# Patient Record
Sex: FEMALE | Race: WHITE | Employment: FULL TIME | URBAN - METROPOLITAN AREA
[De-identification: names, ages, dates, MRNs, and addresses within clinical notes are randomized per-mention and may not be internally consistent; named-entity substitution may affect disease eponyms.]

---

## 2017-03-22 ENCOUNTER — OFFICE VISIT (OUTPATIENT)
Dept: OTOLARYNGOLOGY | Age: 27
End: 2017-03-22
Payer: COMMERCIAL

## 2017-03-22 ENCOUNTER — HOSPITAL ENCOUNTER (OUTPATIENT)
Age: 27
Setting detail: SPECIMEN
Discharge: HOME | End: 2017-03-22

## 2017-03-22 VITALS
HEART RATE: 79 BPM | BODY MASS INDEX: 21.21 KG/M2 | WEIGHT: 132 LBS | HEIGHT: 66 IN | SYSTOLIC BLOOD PRESSURE: 114 MMHG | TEMPERATURE: 98 F | DIASTOLIC BLOOD PRESSURE: 75 MMHG

## 2017-03-22 DIAGNOSIS — H92.03 OTALGIA OF BOTH EARS: ICD-10-CM

## 2017-03-22 DIAGNOSIS — J32.0 CHRONIC MAXILLARY SINUSITIS: ICD-10-CM

## 2017-03-22 DIAGNOSIS — J30.2 SEASONAL ALLERGIC RHINITIS, UNSPECIFIED ALLERGIC RHINITIS TRIGGER: ICD-10-CM

## 2017-03-22 DIAGNOSIS — H91.90 HEARING LOSS, UNSPECIFIED LATERALITY: Primary | ICD-10-CM

## 2017-03-22 PROCEDURE — 99203 OFFICE O/P NEW LOW 30 MIN: CPT | Performed by: OTOLARYNGOLOGY

## 2017-03-27 ENCOUNTER — TELEPHONE (OUTPATIENT)
Dept: OTOLARYNGOLOGY | Age: 27
End: 2017-03-27

## 2017-04-10 ENCOUNTER — TELEPHONE (OUTPATIENT)
Dept: OTOLARYNGOLOGY | Age: 27
End: 2017-04-10

## 2017-06-30 ENCOUNTER — OFFICE VISIT (OUTPATIENT)
Dept: FAMILY MEDICINE CLINIC | Age: 27
End: 2017-06-30
Payer: COMMERCIAL

## 2017-06-30 VITALS
BODY MASS INDEX: 21.89 KG/M2 | HEIGHT: 66 IN | TEMPERATURE: 97 F | WEIGHT: 136.2 LBS | SYSTOLIC BLOOD PRESSURE: 99 MMHG | DIASTOLIC BLOOD PRESSURE: 70 MMHG | HEART RATE: 75 BPM

## 2017-06-30 DIAGNOSIS — N76.0 ACUTE VAGINITIS: Primary | ICD-10-CM

## 2017-06-30 PROCEDURE — 99213 OFFICE O/P EST LOW 20 MIN: CPT | Performed by: FAMILY MEDICINE

## 2017-06-30 RX ORDER — FLUCONAZOLE 150 MG/1
150 TABLET ORAL ONCE
Qty: 2 TABLET | Refills: 0 | Status: SHIPPED | OUTPATIENT
Start: 2017-06-30 | End: 2017-06-30

## 2017-06-30 RX ORDER — METRONIDAZOLE 500 MG/1
500 TABLET ORAL 2 TIMES DAILY
Qty: 14 TABLET | Refills: 0 | Status: SHIPPED | OUTPATIENT
Start: 2017-06-30 | End: 2019-04-11 | Stop reason: SDUPTHER

## 2017-06-30 ASSESSMENT — ENCOUNTER SYMPTOMS
DIARRHEA: 0
CONSTIPATION: 0
NAUSEA: 0
VOMITING: 0
SHORTNESS OF BREATH: 0
ABDOMINAL PAIN: 0
COUGH: 0

## 2017-06-30 ASSESSMENT — PATIENT HEALTH QUESTIONNAIRE - PHQ9
2. FEELING DOWN, DEPRESSED OR HOPELESS: 0
SUM OF ALL RESPONSES TO PHQ9 QUESTIONS 1 & 2: 0
SUM OF ALL RESPONSES TO PHQ QUESTIONS 1-9: 0
1. LITTLE INTEREST OR PLEASURE IN DOING THINGS: 0

## 2017-11-09 ENCOUNTER — OFFICE VISIT (OUTPATIENT)
Dept: FAMILY MEDICINE CLINIC | Age: 27
End: 2017-11-09
Payer: COMMERCIAL

## 2017-11-09 VITALS
TEMPERATURE: 97.7 F | SYSTOLIC BLOOD PRESSURE: 117 MMHG | HEIGHT: 66 IN | WEIGHT: 136.2 LBS | BODY MASS INDEX: 21.89 KG/M2 | HEART RATE: 81 BPM | DIASTOLIC BLOOD PRESSURE: 68 MMHG

## 2017-11-09 DIAGNOSIS — Z00.00 HEALTHCARE MAINTENANCE: ICD-10-CM

## 2017-11-09 DIAGNOSIS — N92.6 IRREGULAR PERIODS: Primary | ICD-10-CM

## 2017-11-09 PROCEDURE — 99213 OFFICE O/P EST LOW 20 MIN: CPT | Performed by: FAMILY MEDICINE

## 2017-11-09 ASSESSMENT — ENCOUNTER SYMPTOMS
ABDOMINAL PAIN: 0
ABDOMINAL DISTENTION: 0
SHORTNESS OF BREATH: 0
COUGH: 0

## 2017-11-09 NOTE — PROGRESS NOTES
Visit Information    Have you changed or started any medications since your last visit including any over-the-counter medicines, vitamins, or herbal medicines? no   Have you stopped taking any of your medications? Is so, why? -  no  Are you having any side effects from any of your medications? - no    Have you seen any other physician or provider since your last visit?  no   Have you had any other diagnostic tests since your last visit?  no   Have you been seen in the emergency room and/or had an admission in a hospital since we last saw you?  no   Have you had your routine dental cleaning in the past 6 months?  yes -      Do you have an active MyChart account? If no, what is the barrier?   No:     Patient Care Team:  Meg Drake MD as PCP - General (Family Medicine)    Medical History Review  Past Medical, Family, and Social History reviewed and does not contribute to the patient presenting condition    Health Maintenance   Topic Date Due    HIV screen  09/03/2007    Cervical cancer screen  12/16/2016    Flu vaccine (1) 09/01/2017    DTaP/Tdap/Td vaccine (1 - Tdap) 11/15/2017 (Originally 9/3/2011)

## 2017-11-09 NOTE — PROGRESS NOTES
Subjective:      Patient ID: Miriam Mae is a 24 y.o. female. HPI  Patient is here with the complain of irregular menstrual period. She say she gets them every month, sometimes 3 days earlier and 4 days later than her date. Has been going for 5 months. Period lasts for 5-7 days, heavy in first 2 days. Mild cramping at first day. Denies any spotting, no vaginal discharge. States she was put on OCPs for contraception and irregular menstrual period but she got very depressed and was taken off. Depo is not covered by her insurance. Denies any h/o STDs. No abnormal wt gain or hair growth. Review of Systems   Constitutional: Negative for activity change and chills. Eyes: Negative for visual disturbance. Respiratory: Negative for cough and shortness of breath. Cardiovascular: Negative for chest pain, palpitations and leg swelling. Gastrointestinal: Negative for abdominal distention and abdominal pain. Genitourinary: Positive for menstrual problem. Negative for dyspareunia, dysuria, flank pain, frequency, pelvic pain and vaginal discharge. Objective:   Physical Exam   Constitutional: She appears well-developed and well-nourished. No distress. HENT:   Head: Normocephalic and atraumatic. Cardiovascular: Normal rate and regular rhythm. Exam reveals no friction rub. No murmur heard. Pulmonary/Chest: Effort normal and breath sounds normal. No respiratory distress. She has no wheezes. Abdominal: Soft. Bowel sounds are normal. She exhibits no distension. There is no tenderness. Skin: She is not diaphoretic. Nursing note and vitals reviewed. Assessment and Plan:       1. Irregular periods  - Advised patient to keep track of the menstrual period.  - Patient's cycle is not less than 21 days or longer than 45 days. - Information/handouts given   - TSH With Reflex Ft4; Future    2. Healthcare maintenance  - HIV-1 And HIV-2 Antibodies;  Future  - PAP smear at next visit  - Patient will get the flu shot at [facility]        Requested Prescriptions      No prescriptions requested or ordered in this encounter       There are no discontinued medications. Rogerio Shepherd received counseling on the following healthy behaviors: nutrition, exercise and medication adherence    Patient given educational materials : see patient instruction     I have instructed Rogerio Shepherd to complete a self tracking handout on menstrual periods and instructed them to bring it with them to her next appointment. Discussed use, benefit, and side effects of prescribed medications. Barriers to medication compliance addressed. All patient questions answered. Pt voiced understanding. Return in about 3 months (around 2/9/2018) for Pap smear and irregular period.

## 2017-11-09 NOTE — PATIENT INSTRUCTIONS
Thank you for letting us take care of you today. We hope all your questions were addressed. If a question was overlooked or something else comes to mind after you return home, please contact a member of your Care Team listed below. Please make sure you have a routine office visit set up to follow-up on [address]. Your Care Team at [place] [address] is Team #2  Jesús Vogt,  (Faculty)  Adilson Darnell MD (Resindent)  Lakisha Mota MD (Resident)  Quiana Shin MD (Resident)  Jaye Hannon MD (Resident)  Court Alegria MD (Resident)  Kerry Carmona, CHICO Barker., A  Verenice Stock, AMY Saucedo ([address])  Alejandra Ackerman RN, ([address] [facility])  Gerson Mcneill, Ph.D., (Behavioral Services)  Griselda Nanas, [address] [place] [address] (Clinical Pharmacist)     Office phone number: [phone]    If you need to get in right away due to illness, please be advised we have \"Same Day\" appointments available Monday-Friday. Please call us at [phone] option #1 to schedule your \"Same Day\" appointment. Patient Education        Abnormal Uterine Bleeding: Care Instructions  Your Care Instructions  Abnormal uterine bleeding (AUB) is irregular bleeding from the uterus that is longer or heavier than usual or does not occur at your regular time. Sometimes it is caused by changes in hormone levels. It can also be caused by growths in the uterus, such as fibroids or polyps. Sometimes a cause cannot be found. You may have heavy bleeding when you are not expecting your period. Your doctor may suggest a pregnancy test, if you think you are pregnant. Follow-up care is a key part of your treatment and safety. Be sure to make and go to all appointments, and call your doctor if you are having problems. It's also a good idea to know your test results and keep a list of the medicines you take. How can you care for yourself at home? · Be safe with medicines. Take pain medicines exactly as directed.   ¨ If the doctor gave you a prescription medicine for pain, take it as prescribed. ¨ If you are not taking a prescription pain medicine, ask your doctor if you can take an over-the-counter medicine. · You may be low in iron because of blood loss. Eat a balanced diet that is high in iron and vitamin C. Foods rich in iron include red meat, shellfish, eggs, beans, and leafy green vegetables. Talk to your doctor about whether you need to take iron pills or a multivitamin. When should you call for help? Call 911 anytime you think you may need emergency care. For example, call if:  · You passed out (lost consciousness). Call your doctor now or seek immediate medical care if:  · You have sudden, severe pain in your belly or pelvis. · You have severe vaginal bleeding. You are soaking through your usual pads or tampons every hour for 2 or more hours. · You feel dizzy or lightheaded, or you feel like you may faint. Watch closely for changes in your health, and be sure to contact your doctor if:  · You have new belly or pelvic pain. · You have a fever. · Your bleeding gets worse or lasts longer than 1 week. · You think you may be pregnant. Where can you learn more? Go to https://[employer]. org and sign in to your [employer] account. Enter Z172 in the "[employer] box to learn more about \"Abnormal Uterine Bleeding: Care Instructions. \"     If you do not have an account, please click on the \"Sign Up Now\" link. Current as of: October 13, 2016  Content Version: 11.3  © 9066-5036 [employer]. Care instructions adapted under license by [facility] ([facility]). If you have questions about a medical condition or this instruction, always ask your healthcare professional. [place] [address] any warranty or liability for your use of this information.

## 2018-04-12 PROBLEM — Z00.00 HEALTHCARE MAINTENANCE: Status: RESOLVED | Noted: 2017-11-09 | Resolved: 2018-04-12

## 2018-07-03 ENCOUNTER — OFFICE VISIT (OUTPATIENT)
Dept: FAMILY MEDICINE CLINIC | Age: 28
End: 2018-07-03
Payer: COMMERCIAL

## 2018-07-03 VITALS
DIASTOLIC BLOOD PRESSURE: 74 MMHG | HEART RATE: 76 BPM | SYSTOLIC BLOOD PRESSURE: 118 MMHG | WEIGHT: 138.6 LBS | HEIGHT: 66 IN | BODY MASS INDEX: 22.28 KG/M2 | TEMPERATURE: 98.1 F

## 2018-07-03 DIAGNOSIS — B37.31 VAGINAL YEAST INFECTION: ICD-10-CM

## 2018-07-03 DIAGNOSIS — F32.A ANXIETY AND DEPRESSION: ICD-10-CM

## 2018-07-03 DIAGNOSIS — R61 EXCESSIVE SWEATING: Primary | ICD-10-CM

## 2018-07-03 DIAGNOSIS — F41.9 ANXIETY AND DEPRESSION: ICD-10-CM

## 2018-07-03 PROCEDURE — 99213 OFFICE O/P EST LOW 20 MIN: CPT | Performed by: STUDENT IN AN ORGANIZED HEALTH CARE EDUCATION/TRAINING PROGRAM

## 2018-07-03 RX ORDER — ALPRAZOLAM 0.25 MG/1
0.25 TABLET ORAL 3 TIMES DAILY PRN
Qty: 30 TABLET | Refills: 0 | Status: SHIPPED | OUTPATIENT
Start: 2018-07-03 | End: 2018-07-13

## 2018-07-03 RX ORDER — FLUCONAZOLE 100 MG/1
150 TABLET ORAL ONCE
Qty: 2 TABLET | Refills: 0 | Status: SHIPPED | OUTPATIENT
Start: 2018-07-03 | End: 2018-07-03

## 2018-07-03 ASSESSMENT — ENCOUNTER SYMPTOMS
RHINORRHEA: 0
PHOTOPHOBIA: 0
WHEEZING: 0
VOMITING: 0
NAUSEA: 0
EYE PAIN: 0
ABDOMINAL PAIN: 0
COUGH: 0
SHORTNESS OF BREATH: 0
SORE THROAT: 0

## 2018-07-03 ASSESSMENT — PATIENT HEALTH QUESTIONNAIRE - PHQ9
SUM OF ALL RESPONSES TO PHQ QUESTIONS 1-9: 0
SUM OF ALL RESPONSES TO PHQ9 QUESTIONS 1 & 2: 0
2. FEELING DOWN, DEPRESSED OR HOPELESS: 0
1. LITTLE INTEREST OR PLEASURE IN DOING THINGS: 0

## 2018-07-03 NOTE — PROGRESS NOTES
Subjective:    Donta Salmeron is a 24 y.o. female with  has a past medical history of Anxiety; C. difficile colitis; Depression; Environmental allergies; and Seasonal allergic rhinitis. Family History   Problem Relation Age of Onset    High Blood Pressure Father     High Cholesterol Father     Cancer Other        Presented to the office today for:  Chief Complaint   Patient presents with    Vaginitis     patient states that she has a yest infection, itching, discharge, no oder,dry    Medication Refill     patient states that she is here for medication refill       HPI   Patient is a 24year old here for complaints of yeast infection and excessive sweating. Pt states she noticed she was having dry, itching with white thick discharge. Pt states she is ovulating which may be contributing. Pt is currently sexually active with her fiancee and they use condoms. Pt does not wish to be treated for any STDs at this visit. Pt has been having excess sweating for her entire life and received a recommendation from her mother in law to try Drysol before her wedding in September. Review of Systems   Constitutional: Negative for chills and fever. HENT: Negative for congestion, rhinorrhea and sore throat. Eyes: Negative for photophobia and pain. Respiratory: Negative for cough, shortness of breath and wheezing. Cardiovascular: Negative for chest pain and palpitations. Gastrointestinal: Negative for abdominal pain, nausea and vomiting. Genitourinary: Positive for vaginal discharge. Negative for frequency and urgency. Musculoskeletal: Negative for arthralgias and myalgias. Neurological: Negative for dizziness, light-headedness and headaches.        Objective:    /74 (Site: Right Arm, Position: Sitting, Cuff Size: Medium Adult) Comment: machine  Pulse 76   Temp 98.1 °F (36.7 °C) (Temporal)   Ht 5' 6\" (1.676 m)   Wt 138 lb 9.6 oz (62.9 kg)   LMP 06/16/2018   BMI 22.37 kg/m²    BP Readings

## 2018-07-03 NOTE — PROGRESS NOTES
I have reviewed and discussed key elements of [address] with the resident including plan of care and follow up and agree with the care daryl plan.

## 2019-01-14 ENCOUNTER — HOSPITAL ENCOUNTER (OUTPATIENT)
Age: 29
Setting detail: SPECIMEN
Discharge: HOME | End: 2019-01-14
Payer: OTHER GOVERNMENT

## 2019-01-14 ENCOUNTER — OFFICE VISIT (OUTPATIENT)
Dept: OBGYN CLINIC | Age: 29
End: 2019-01-14
Payer: OTHER GOVERNMENT

## 2019-01-14 VITALS
BODY MASS INDEX: 21.98 KG/M2 | WEIGHT: 136.8 LBS | DIASTOLIC BLOOD PRESSURE: 86 MMHG | HEIGHT: 66 IN | SYSTOLIC BLOOD PRESSURE: 138 MMHG

## 2019-01-14 DIAGNOSIS — Z72.51 UNPROTECTED SEXUAL INTERCOURSE: ICD-10-CM

## 2019-01-14 DIAGNOSIS — Z01.419 ENCOUNTER FOR GYNECOLOGICAL EXAMINATION: Primary | ICD-10-CM

## 2019-01-14 DIAGNOSIS — N89.8 VAGINAL ITCHING: ICD-10-CM

## 2019-01-14 PROCEDURE — 99385 PREV VISIT NEW AGE 18-39: CPT | Performed by: NURSE PRACTITIONER

## 2019-01-14 RX ORDER — ALPRAZOLAM 0.25 MG/1
0.25 TABLET ORAL NIGHTLY PRN
COMMUNITY
End: 2022-08-12

## 2019-01-14 ASSESSMENT — ENCOUNTER SYMPTOMS
BACK PAIN: 0
ABDOMINAL PAIN: 0
CONSTIPATION: 0
COUGH: 0
ABDOMINAL DISTENTION: 0
SHORTNESS OF BREATH: 0
DIARRHEA: 0

## 2019-01-15 LAB
DIRECT EXAM: ABNORMAL
Lab: ABNORMAL
SPECIMEN DESCRIPTION: ABNORMAL
STATUS: ABNORMAL

## 2019-01-15 RX ORDER — FLUCONAZOLE 150 MG/1
150 TABLET ORAL ONCE
Qty: 1 TABLET | Refills: 0 | Status: SHIPPED | OUTPATIENT
Start: 2019-01-15 | End: 2019-01-15

## 2019-01-28 LAB — CYTOLOGY REPORT: NORMAL

## 2019-02-06 ENCOUNTER — OFFICE VISIT (OUTPATIENT)
Dept: FAMILY MEDICINE CLINIC | Age: 29
End: 2019-02-06
Payer: OTHER GOVERNMENT

## 2019-02-06 VITALS
HEART RATE: 95 BPM | SYSTOLIC BLOOD PRESSURE: 119 MMHG | HEIGHT: 66 IN | TEMPERATURE: 98.3 F | BODY MASS INDEX: 22.47 KG/M2 | WEIGHT: 139.8 LBS | DIASTOLIC BLOOD PRESSURE: 82 MMHG

## 2019-02-06 DIAGNOSIS — R61 EXCESSIVE SWEATING: ICD-10-CM

## 2019-02-06 DIAGNOSIS — M79.675 TOE PAIN, LEFT: ICD-10-CM

## 2019-02-06 DIAGNOSIS — K21.9 GASTROESOPHAGEAL REFLUX DISEASE, ESOPHAGITIS PRESENCE NOT SPECIFIED: Primary | ICD-10-CM

## 2019-02-06 LAB
CONTROL: PRESENT
PREGNANCY TEST URINE, POC: NEGATIVE

## 2019-02-06 PROCEDURE — 99213 OFFICE O/P EST LOW 20 MIN: CPT | Performed by: STUDENT IN AN ORGANIZED HEALTH CARE EDUCATION/TRAINING PROGRAM

## 2019-02-06 PROCEDURE — 99211 OFF/OP EST MAY X REQ PHY/QHP: CPT | Performed by: STUDENT IN AN ORGANIZED HEALTH CARE EDUCATION/TRAINING PROGRAM

## 2019-02-06 PROCEDURE — 81025 URINE PREGNANCY TEST: CPT | Performed by: STUDENT IN AN ORGANIZED HEALTH CARE EDUCATION/TRAINING PROGRAM

## 2019-02-06 RX ORDER — OMEPRAZOLE 20 MG/1
20 CAPSULE, DELAYED RELEASE ORAL DAILY
Qty: 30 CAPSULE | Refills: 1 | Status: SHIPPED | OUTPATIENT
Start: 2019-02-06 | End: 2019-04-11 | Stop reason: ALTCHOICE

## 2019-02-06 ASSESSMENT — PATIENT HEALTH QUESTIONNAIRE - PHQ9
1. LITTLE INTEREST OR PLEASURE IN DOING THINGS: 0
SUM OF ALL RESPONSES TO PHQ QUESTIONS 1-9: 0
SUM OF ALL RESPONSES TO PHQ9 QUESTIONS 1 & 2: 0
2. FEELING DOWN, DEPRESSED OR HOPELESS: 0
SUM OF ALL RESPONSES TO PHQ QUESTIONS 1-9: 0

## 2019-02-06 ASSESSMENT — ENCOUNTER SYMPTOMS
DIARRHEA: 0
CONSTIPATION: 0
BLOOD IN STOOL: 0
VOMITING: 1
SHORTNESS OF BREATH: 0
SORE THROAT: 0
COUGH: 0
WHEEZING: 0
NAUSEA: 1
ABDOMINAL PAIN: 1

## 2019-02-11 ENCOUNTER — TELEPHONE (OUTPATIENT)
Dept: FAMILY MEDICINE CLINIC | Age: 29
End: 2019-02-11

## 2019-02-11 DIAGNOSIS — K21.9 GASTROESOPHAGEAL REFLUX DISEASE, ESOPHAGITIS PRESENCE NOT SPECIFIED: ICD-10-CM

## 2019-02-11 DIAGNOSIS — T50.905A ADVERSE EFFECT OF DRUG, INITIAL ENCOUNTER: Primary | ICD-10-CM

## 2019-03-14 ENCOUNTER — OFFICE VISIT (OUTPATIENT)
Dept: FAMILY MEDICINE CLINIC | Age: 29
End: 2019-03-14
Payer: OTHER GOVERNMENT

## 2019-03-14 VITALS
HEART RATE: 81 BPM | WEIGHT: 135.2 LBS | HEIGHT: 66 IN | TEMPERATURE: 98.2 F | BODY MASS INDEX: 21.73 KG/M2 | DIASTOLIC BLOOD PRESSURE: 69 MMHG | SYSTOLIC BLOOD PRESSURE: 117 MMHG

## 2019-03-14 DIAGNOSIS — K59.00 CONSTIPATION, UNSPECIFIED CONSTIPATION TYPE: ICD-10-CM

## 2019-03-14 DIAGNOSIS — K21.9 GASTROESOPHAGEAL REFLUX DISEASE, ESOPHAGITIS PRESENCE NOT SPECIFIED: Primary | ICD-10-CM

## 2019-03-14 DIAGNOSIS — Z23 NEED FOR TDAP VACCINATION: ICD-10-CM

## 2019-03-14 PROCEDURE — 99212 OFFICE O/P EST SF 10 MIN: CPT | Performed by: STUDENT IN AN ORGANIZED HEALTH CARE EDUCATION/TRAINING PROGRAM

## 2019-03-14 PROCEDURE — 99211 OFF/OP EST MAY X REQ PHY/QHP: CPT | Performed by: STUDENT IN AN ORGANIZED HEALTH CARE EDUCATION/TRAINING PROGRAM

## 2019-03-14 PROCEDURE — 90715 TDAP VACCINE 7 YRS/> IM: CPT | Performed by: FAMILY MEDICINE

## 2019-03-14 RX ORDER — DOCUSATE SODIUM 100 MG/1
100 CAPSULE, LIQUID FILLED ORAL 2 TIMES DAILY PRN
Qty: 60 CAPSULE | Refills: 0 | Status: SHIPPED | OUTPATIENT
Start: 2019-03-14 | End: 2020-02-17 | Stop reason: ALTCHOICE

## 2019-03-14 RX ORDER — RANITIDINE 150 MG/1
150 TABLET ORAL 2 TIMES DAILY PRN
Qty: 180 TABLET | Refills: 1 | Status: SHIPPED | OUTPATIENT
Start: 2019-03-14 | End: 2020-02-17 | Stop reason: ALTCHOICE

## 2019-04-02 ENCOUNTER — HOSPITAL ENCOUNTER (OUTPATIENT)
Age: 29
Setting detail: SPECIMEN
Discharge: HOME | End: 2019-04-02
Payer: OTHER GOVERNMENT

## 2019-04-02 ENCOUNTER — OFFICE VISIT (OUTPATIENT)
Dept: FAMILY MEDICINE CLINIC | Age: 29
End: 2019-04-02
Payer: OTHER GOVERNMENT

## 2019-04-02 VITALS
SYSTOLIC BLOOD PRESSURE: 114 MMHG | WEIGHT: 134.6 LBS | BODY MASS INDEX: 21.63 KG/M2 | DIASTOLIC BLOOD PRESSURE: 78 MMHG | HEIGHT: 66 IN | HEART RATE: 94 BPM

## 2019-04-02 DIAGNOSIS — N89.8 VAGINAL ITCHING: ICD-10-CM

## 2019-04-02 DIAGNOSIS — B37.31 RECURRENT CANDIDIASIS OF VAGINA: ICD-10-CM

## 2019-04-02 DIAGNOSIS — B37.31 RECURRENT CANDIDIASIS OF VAGINA: Primary | ICD-10-CM

## 2019-04-02 DIAGNOSIS — B37.31 VAGINAL YEAST INFECTION: ICD-10-CM

## 2019-04-02 DIAGNOSIS — K21.9 GASTROESOPHAGEAL REFLUX DISEASE, ESOPHAGITIS PRESENCE NOT SPECIFIED: ICD-10-CM

## 2019-04-02 LAB
ESTIMATED AVERAGE GLUCOSE: 97 MG/DL
HBA1C MFR BLD: 5 % (ref 4–6)

## 2019-04-02 PROCEDURE — 81003 URINALYSIS AUTO W/O SCOPE: CPT | Performed by: STUDENT IN AN ORGANIZED HEALTH CARE EDUCATION/TRAINING PROGRAM

## 2019-04-02 PROCEDURE — 99213 OFFICE O/P EST LOW 20 MIN: CPT | Performed by: STUDENT IN AN ORGANIZED HEALTH CARE EDUCATION/TRAINING PROGRAM

## 2019-04-02 RX ORDER — FLUCONAZOLE 150 MG/1
150 TABLET ORAL DAILY
Qty: 3 TABLET | Refills: 0 | Status: SHIPPED | OUTPATIENT
Start: 2019-04-02 | End: 2019-04-05

## 2019-04-02 ASSESSMENT — ENCOUNTER SYMPTOMS
CONSTIPATION: 0
GASTROINTESTINAL NEGATIVE: 1
SHORTNESS OF BREATH: 0
EYES NEGATIVE: 1
WHEEZING: 0
COUGH: 0
VOMITING: 0
DIARRHEA: 0
NAUSEA: 0

## 2019-04-02 NOTE — PATIENT INSTRUCTIONS
Thank you for letting us take care of you today. We hope all your questions were addressed. If a question was overlooked or something else comes to mind after you return home, please contact a member of your Care Team listed below. Please make sure you have a routine office visit set up to follow-up on [address]. Your Care Team at [place] [address] is Team #4  Dana Chowdhury MD (Faculty)  MD Lakisha Hubbard MD (Resident)  Binu Kelly MD (Resident)  Mariam Squires MD (Resident)  Edmond Gonzalez MD (Resident)  Ad Edward MD (Resident)  [facility]., [facility]  Gordon Correa., [facility] office)  Aliya Coley [facility] office)  [facility])  Maureen Pinto, [address] [facility] ([address] [facility])  Marnie Darden, Ph.D., (Behavioral Services)  Sabas Irvin [facility] (Clinical Pharmacist)       Office phone number: [phone]    If you need to get in right away due to illness, please be advised we have \"Same Day\" appointments available Monday-Friday. Please call us at [phone] option #3 to schedule your \"Same Day\" appointment.

## 2019-04-02 NOTE — PROGRESS NOTES
Visit Information    Have you changed or started any medications since your last visit including any over-the-counter medicines, vitamins, or herbal medicines? no   Have you stopped taking any of your medications? Is so, why? -  no  Are you having any side effects from any of your medications? - no    Have you seen any other physician or provider since your last visit?  no   Have you had any other diagnostic tests since your last visit?  no   Have you been seen in the emergency room and/or had an admission in a hospital since we last saw you?  no   Have you had your routine dental cleaning in the past 6 months?  no     Do you have an active MyChart account? If no, what is the barrier?   Yes    Patient Care Team:  Thais Clark MD as PCP - General (Family Medicine)    Medical History Review  Past Medical, Family, and Social History reviewed and does not contribute to the patient presenting condition    Health Maintenance   Topic Date Due    Varicella Vaccine (1 of 2 - 13+ 2-dose series) 09/03/2005    HIV screen  09/03/2007    Flu vaccine (Season Ended) 09/01/2019    Cervical cancer screen  01/14/2022    DTaP/Tdap/Td vaccine (2 - Td) 03/14/2029    HPV vaccine  Completed

## 2019-04-02 NOTE — PROGRESS NOTES
urinSubjective:    Miroslava Mckeon is a 34 y.o. female with  has a past medical history of Anxiety, C. difficile colitis, Depression, Environmental allergies, and Seasonal allergic rhinitis. Family History   Problem Relation Age of Onset    High Blood Pressure Father     High Cholesterol Father     Cancer Other        Presented to the office today for:  Chief Complaint   Patient presents with    Vaginal Pain     c/o pain and itching, slight creamy discharge. Odor, little bleeding. Lasting 1 week. HPI    CC: Vaginal itchiness for 7 days. The patient has a history of recurrent vaginal itchiness for the past 7 days. She has had 3 prior Candida infections over the past year. She notes increased vaginal discharge, sometimes malodorous. She has one sexual partner, her . Notes pain with sexual intercourse at times. Denies urinary symptoms at this time, no dysuria, increased frequency or flank pain. No fever/chills. History of UTI's in the past. She denies polydipsia, polyphagia, polyuria. There is a family history in her paternal side of diabetes. No recent ABx use. No history of STI's. History of having warts as a child (8years old), on no medications, no outbreaks since then. Patient has a follow-up OBGYN appointment next week. Hx Hyperhidrosis - Drysol. She had minimal relief from the Drysol. She has been followed by a Dermatologist.  Hx GERD - Patient is currently on a PPI, she will continue for 2 more weeks and then transition to Zantac. Pelvic Exam:  External genitalia: normal general appearance  Vaginal: normal mucosa without prolapse or lesions, normal without tenderness, induration or masses, normal rugae and discharge, white  Clinical staff offered to be present for exam: yes [facility])    Review of Systems   Constitutional: Negative for chills, fatigue and fever. HENT: Negative. Eyes: Negative. Respiratory: Negative for cough, shortness of breath and wheezing. Cardiovascular: Negative for chest pain, palpitations and leg swelling. Gastrointestinal: Negative. Negative for constipation, diarrhea, nausea and vomiting. Endocrine: Negative for polydipsia, polyphagia and polyuria. Genitourinary: Positive for vaginal discharge (white vaginal discharge noted at times) and vaginal pain (increased vaginal pain with intercourse). Negative for difficulty urinating, dyspareunia, dysuria, enuresis, flank pain, frequency, hematuria, menstrual problem, pelvic pain and vaginal bleeding. Musculoskeletal: Negative. Skin: Negative. Negative for rash. Neurological: Negative for light-headedness and headaches. Psychiatric/Behavioral: Negative. Objective:    /78 (Site: Left Upper Arm, Position: Sitting, Cuff Size: Medium Adult) Comment: machine  Pulse 94   Ht 5' 5.98\" (1.676 m)   Wt 134 lb 9.6 oz (61.1 kg)   LMP 03/16/2019 (Exact Date)   BMI 21.74 kg/m²    BP Readings from Last 3 Encounters:   04/02/19 114/78   03/14/19 117/69   02/06/19 119/82     Physical Exam   Constitutional: She is oriented to person, place, and time. She appears well-developed and well-nourished. No distress. HENT:   Head: Normocephalic and atraumatic. Eyes: Pupils are equal, round, and reactive to light. Conjunctivae are normal. Right eye exhibits no discharge. Left eye exhibits no discharge. Neck: Normal range of motion. Neck supple. No tracheal deviation present. No thyromegaly present. Cardiovascular: Normal rate, regular rhythm and normal heart sounds. No murmur heard. Pulmonary/Chest: Effort normal and breath sounds normal. No respiratory distress. She has no wheezes. Abdominal: Soft. Bowel sounds are normal. She exhibits no distension. There is no tenderness. Musculoskeletal: Normal range of motion. Neurological: She is alert and oriented to person, place, and time. Skin: Skin is warm and dry. She is not diaphoretic.    Psychiatric: She has a normal mood and affect. Vitals reviewed. Lab Results   Component Value Date    WBC 10.6 09/24/2014    HGB 14.2 09/24/2014    HCT 41.8 09/24/2014     09/24/2014     09/24/2014    K 3.8 09/24/2014    CL 99 09/24/2014    CREATININE 0.55 09/24/2014    BUN 9 09/24/2014    CO2 22 09/24/2014    TSH 2.132 12/19/2011     Lab Results   Component Value Date    CALCIUM 9.4 09/24/2014     No results found for: LDLCALC, LDLCHOLESTEROL, LDLDIRECT    Assessment:     1. Recurrent candidiasis of vagina    2. Vaginal itching    3. Gastroesophageal reflux disease, esophagitis presence not specified        Plan:   1. Recurrent candidiasis of vagina  -this is likely the 4th episode in the past year. Patient will be provided with Diflucan 150mg PO X3 doses (every 3 days)  - C.trachomatis N.gonorrhoeae DNA; Future  - Hemoglobin A1C; Future, r/o diabetes due to recurrent candidiasis  -F/U with OBGYN appointment next week    2. Vaginal itching  - VAGINITIS DNA PROBE; Future  - C.trachomatis N.gonorrhoeae DNA; Future  - Urine Culture; Future    3. Gastroesophageal reflux disease, esophagitis presence not specified  -Continue with PPI for now and will transition to Zantac    Taylorreceived counseling on the following healthy behaviors: nutrition, exercise and medication adherence    Patient given educational materials : see patient instruction   Isabela Ayala received counseling on the following healthy behaviors: medication adherence  Reviewed prior labs and health maintenance  Continue current medications, diet and exercise. Discussed use, benefit, and side effects of prescribed medications. Barriers to medication compliance addressed. Patient given educational materials - see patient instructions  Was a self-tracking handout given in paper form or via [employer]?  No    Requested Prescriptions     Signed Prescriptions Disp Refills    fluconazole (DIFLUCAN) 150 MG tablet 3 tablet 0     Sig: Take 1 tablet by mouth daily for 3 days       All patient questions answered. Patient voiced understanding. Quality Measures    Body mass index is 21.74 kg/m². Normal. Weight control planned discussed Healthy diet and regular exercise. BP: 114/78(machine) Blood pressure is normal. Treatment plan consists of No treatment change needed. No results found for: LDLCALC, LDLCHOLESTEROL, LDLDIRECT (goal LDL reduction with dx if diabetes is 50% LDL reduction)      PHQ Scores 2/6/2019 7/3/2018 6/30/2017   PHQ2 Score 0 0 0   PHQ9 Score 0 0 0     Interpretation of Total Score Depression Severity: 1-4 = Minimal depression, 5-9 = Mild depression, 10-14 = Moderate depression, 15-19 = Moderately severe depression, 20-27 = Severe depression  Discussed use, benefit, and side effects of prescribed medications. Barriers to medicationcompliance addressed. All patient questions answered. Pt voiced understanding. There are no discontinued medications. Return in about 1 month (around 5/2/2019), or if symptoms worsen or fail to improve.

## 2019-04-03 LAB
BILIRUBIN, POC: NEGATIVE
BLOOD URINE, POC: NORMAL
C TRACH DNA GENITAL QL NAA+PROBE: NEGATIVE
CLARITY, POC: NORMAL
COLOR, POC: NORMAL
DIRECT EXAM: NORMAL
GLUCOSE URINE, POC: NEGATIVE
KETONES, POC: NEGATIVE
LEUKOCYTE EST, POC: NEGATIVE
Lab: NORMAL
N. GONORRHOEAE DNA: NEGATIVE
NITRITE, POC: NEGATIVE
PH, POC: 6
PROTEIN, POC: NEGATIVE
SPECIFIC GRAVITY, POC: >=1.03
SPECIMEN DESCRIPTION: NORMAL
SPECIMEN DESCRIPTION: NORMAL
UROBILINOGEN, POC: 0.2

## 2019-04-04 LAB
CULTURE: NORMAL
Lab: NORMAL
SPECIMEN DESCRIPTION: NORMAL

## 2019-04-11 ENCOUNTER — OFFICE VISIT (OUTPATIENT)
Dept: OBGYN CLINIC | Age: 29
End: 2019-04-11
Payer: OTHER GOVERNMENT

## 2019-04-11 ENCOUNTER — HOSPITAL ENCOUNTER (OUTPATIENT)
Age: 29
Setting detail: SPECIMEN
Discharge: HOME | End: 2019-04-11
Payer: OTHER GOVERNMENT

## 2019-04-11 VITALS
HEIGHT: 66 IN | WEIGHT: 132.6 LBS | DIASTOLIC BLOOD PRESSURE: 86 MMHG | SYSTOLIC BLOOD PRESSURE: 114 MMHG | BODY MASS INDEX: 21.31 KG/M2

## 2019-04-11 DIAGNOSIS — N76.0 ACUTE VAGINITIS: Primary | ICD-10-CM

## 2019-04-11 DIAGNOSIS — N76.0 ACUTE VAGINITIS: ICD-10-CM

## 2019-04-11 LAB
DIRECT EXAM: ABNORMAL
Lab: ABNORMAL
SPECIMEN DESCRIPTION: ABNORMAL

## 2019-04-11 PROCEDURE — 99213 OFFICE O/P EST LOW 20 MIN: CPT | Performed by: NURSE PRACTITIONER

## 2019-04-11 RX ORDER — ESTRADIOL 0.1 MG/G
1 CREAM VAGINAL DAILY
Qty: 1 TUBE | Refills: 3 | Status: SHIPPED | OUTPATIENT
Start: 2019-04-11 | End: 2019-05-03 | Stop reason: ALTCHOICE

## 2019-04-11 RX ORDER — METRONIDAZOLE 500 MG/1
500 TABLET ORAL 2 TIMES DAILY
Qty: 14 TABLET | Refills: 0 | Status: SHIPPED | OUTPATIENT
Start: 2019-04-11 | End: 2019-04-18

## 2019-04-11 NOTE — PROGRESS NOTES
Alcon Lin is here with complaints of a normal and physiologic vaginal discharge for1 months with  mild odor, moderate  itching,  no burning and dyspareunia pain. No UTI sx, no pelvic pain  No change in partners  Exposure to STIs denies  O. Vulva no lesions  Vagina pink with minimal  Discharge  Cervix non friable no lesions  Affirm sent to lab  /86 (Site: Right Upper Arm, Position: Sitting, Cuff Size: Medium Adult)   Ht 5' 6\" (1.676 m)   Wt 132 lb 9.6 oz (60.1 kg)   LMP 03/16/2019 (Exact Date)   Breastfeeding? No   BMI 21.40 kg/m²   ALLERGIES:  NKDA  General Appearance: This is a well Developed, well Nourished, well groomed female. Her BMI was reviewed. Nutritional decision making was discussed,   Skin:  There was a normal Inspection of the skin. There were no rashes or lesions. Lymphatic:  No Lymph Nodes were Palpable in the neck , axilla or groin. Neck and EENT:  The neck was supple. There were no masses   The thyroid was not enlarged and had no masses. Cardiovascular: The lungs were auscultated and found to be clear. There were no rales, rhonchi or wheezes. There was a good respiratory effort. The heart was in a regular rate and rhythm. There was no murmur appreciated. No calf tenderness, edema. Abdomen: The abdomen was soft and non-tender. There were good bowel sounds in all quadrants and there was no guarding, rebound or rigidity. The patient had a normal Orientation to: Time, Place, Person, and Situation  There is no Mood / Affect changes  The uterus was nontender. The  adnexa were palpated and noted no fullness, tenderness or masses in either region. Musculoskeletal:  Normal Gait and station was noted. Digits were evaluated without abnormal findings. Range of motion, stability and strength were evaluated and found to be appropriate for the patients   A. Vaginitis  options. P.    Affirm collected and sent to lab  Will treat results accordingly  Will send estrogen cream if negative  She was also counseled on her preventative health maintenance recommendations and follow-up.   RTC PRN

## 2019-05-03 ENCOUNTER — HOSPITAL ENCOUNTER (OUTPATIENT)
Age: 29
Setting detail: SPECIMEN
Discharge: HOME | End: 2019-05-03
Payer: OTHER GOVERNMENT

## 2019-05-03 ENCOUNTER — OFFICE VISIT (OUTPATIENT)
Dept: OBGYN CLINIC | Age: 29
End: 2019-05-03
Payer: OTHER GOVERNMENT

## 2019-05-03 VITALS
HEIGHT: 66 IN | BODY MASS INDEX: 21.18 KG/M2 | SYSTOLIC BLOOD PRESSURE: 112 MMHG | DIASTOLIC BLOOD PRESSURE: 64 MMHG | WEIGHT: 131.8 LBS

## 2019-05-03 DIAGNOSIS — N89.8 VAGINA ITCHING: ICD-10-CM

## 2019-05-03 DIAGNOSIS — N89.8 VAGINA ITCHING: Primary | ICD-10-CM

## 2019-05-03 LAB
DIRECT EXAM: ABNORMAL
Lab: ABNORMAL
SPECIMEN DESCRIPTION: ABNORMAL

## 2019-05-03 PROCEDURE — 99213 OFFICE O/P EST LOW 20 MIN: CPT | Performed by: NURSE PRACTITIONER

## 2019-05-03 RX ORDER — CETIRIZINE HYDROCHLORIDE 10 MG/1
10 TABLET ORAL DAILY
COMMUNITY

## 2019-05-03 NOTE — PROGRESS NOTES
Kip Garber is here with complaints of a normal and physiologic vaginal discharge for4 days with  no odor, mild  itching,  no burning and no pain. No UTI sx, no pelvic pain  No change in partners  Exposure to STIs denies  O. Vulva no lesions  Vagina pink with minimal  Discharge  Cervix non friable no lesions  Affirm sent to lab  /64 (Site: Right Upper Arm, Position: Sitting, Cuff Size: Medium Adult)   Ht 5' 6\" (1.676 m)   Wt 131 lb 12.8 oz (59.8 kg)   LMP 04/15/2019   Breastfeeding? No   BMI 21.27 kg/m²   ALLERGIES:  NKDA  General Appearance: This is a well Developed, well Nourished, well groomed female. Her BMI was reviewed. Nutritional decision making was discussed,   Skin:  There was a normal Inspection of the skin. There were no rashes or lesions. Lymphatic:  No Lymph Nodes were Palpable in the neck , axilla or groin. Neck and EENT:  The neck was supple. There were no masses   The thyroid was not enlarged and had no masses. Cardiovascular: The lungs were auscultated and found to be clear. There were no rales, rhonchi or wheezes. There was a good respiratory effort. The heart was in a regular rate and rhythm. There was no murmur appreciated. No calf tenderness, edema. Abdomen: The abdomen was soft and non-tender. There were good bowel sounds in all quadrants and there was no guarding, rebound or rigidity. The patient had a normal Orientation to: Time, Place, Person, and Situation  There is no Mood / Affect changes  The uterus was nontender. The  adnexa were palpated and noted no fullness, tenderness or masses in either region. Musculoskeletal:  Normal Gait and station was noted. Digits were evaluated without abnormal findings. Range of motion, stability and strength were evaluated and found to be appropriate for the patients   A. Vaginitis  options. P.    Affirm collected and sent to lab  Will treat results accordingly  She was also counseled on her preventative health maintenance recommendations and follow-up.   RTC PRN

## 2019-05-06 RX ORDER — FLUCONAZOLE 150 MG/1
150 TABLET ORAL ONCE
Qty: 1 TABLET | Refills: 0 | Status: SHIPPED | OUTPATIENT
Start: 2019-05-06 | End: 2019-05-06

## 2019-06-20 ENCOUNTER — OFFICE VISIT (OUTPATIENT)
Dept: FAMILY MEDICINE CLINIC | Age: 29
End: 2019-06-20
Payer: OTHER GOVERNMENT

## 2019-06-20 VITALS
DIASTOLIC BLOOD PRESSURE: 72 MMHG | BODY MASS INDEX: 21.19 KG/M2 | WEIGHT: 131.84 LBS | HEIGHT: 66 IN | HEART RATE: 74 BPM | SYSTOLIC BLOOD PRESSURE: 118 MMHG

## 2019-06-20 DIAGNOSIS — K21.9 GASTROESOPHAGEAL REFLUX DISEASE, ESOPHAGITIS PRESENCE NOT SPECIFIED: Primary | ICD-10-CM

## 2019-06-20 DIAGNOSIS — J30.2 SEASONAL ALLERGIC RHINITIS, UNSPECIFIED TRIGGER: ICD-10-CM

## 2019-06-20 PROCEDURE — 99211 OFF/OP EST MAY X REQ PHY/QHP: CPT | Performed by: FAMILY MEDICINE

## 2019-06-20 PROCEDURE — 99213 OFFICE O/P EST LOW 20 MIN: CPT | Performed by: FAMILY MEDICINE

## 2019-06-20 ASSESSMENT — ENCOUNTER SYMPTOMS
VOMITING: 0
CHEST TIGHTNESS: 0
WHEEZING: 0
NAUSEA: 0
SHORTNESS OF BREATH: 0
ABDOMINAL PAIN: 0
COUGH: 0
DIARRHEA: 0

## 2019-06-21 NOTE — PROGRESS NOTES
I performed a history and physical examination of the patient and discussed management with the resident. I reviewed the residents note and agree with the documented findings and plan of care. Any areas of disagreement are noted on the chart. I have personally evaluated this patient and have completed at least one if not all key elements of the E/M (history, physical exam, and MDM). Additional findings are as noted. I agree with the chief complaint, past medical history, past surgical history, allergies, medications, social and family history as documented unless otherwise noted below.      Electronically signed by Trinh Reyes DO on 6/21/2019 at 8:51 AM

## 2020-02-17 ENCOUNTER — HOSPITAL ENCOUNTER (OUTPATIENT)
Age: 30
Setting detail: SPECIMEN
Discharge: HOME | End: 2020-02-17
Payer: OTHER GOVERNMENT

## 2020-02-17 ENCOUNTER — OFFICE VISIT (OUTPATIENT)
Dept: OBGYN CLINIC | Age: 30
End: 2020-02-17
Payer: OTHER GOVERNMENT

## 2020-02-17 VITALS
BODY MASS INDEX: 20.86 KG/M2 | SYSTOLIC BLOOD PRESSURE: 114 MMHG | WEIGHT: 129.8 LBS | DIASTOLIC BLOOD PRESSURE: 72 MMHG | HEIGHT: 66 IN

## 2020-02-17 PROCEDURE — 99395 PREV VISIT EST AGE 18-39: CPT | Performed by: NURSE PRACTITIONER

## 2020-02-17 ASSESSMENT — ENCOUNTER SYMPTOMS
ABDOMINAL DISTENTION: 0
CONSTIPATION: 0
COUGH: 0
SHORTNESS OF BREATH: 0
BACK PAIN: 0
DIARRHEA: 0
ABDOMINAL PAIN: 0

## 2020-02-24 LAB — CYTOLOGY REPORT: NORMAL

## 2020-08-05 ENCOUNTER — OFFICE VISIT (OUTPATIENT)
Dept: OBGYN CLINIC | Age: 30
End: 2020-08-05
Payer: OTHER GOVERNMENT

## 2020-08-05 VITALS — WEIGHT: 132 LBS | BODY MASS INDEX: 21.31 KG/M2 | DIASTOLIC BLOOD PRESSURE: 60 MMHG | SYSTOLIC BLOOD PRESSURE: 112 MMHG

## 2020-08-05 PROCEDURE — 99213 OFFICE O/P EST LOW 20 MIN: CPT | Performed by: NURSE PRACTITIONER

## 2020-08-05 ASSESSMENT — ENCOUNTER SYMPTOMS
CONSTIPATION: 0
DIARRHEA: 0
SHORTNESS OF BREATH: 0
COUGH: 0
BACK PAIN: 0
ABDOMINAL DISTENTION: 0
ABDOMINAL PAIN: 0

## 2020-08-05 NOTE — PROGRESS NOTES
Subjective:      Patient ID: Citlaly Newton is being seen today for   Chief Complaint   Patient presents with    Other     Desires pregnancy and has been trying for 1 year       HPI  Here to discuss fertility. Has been having unprotected intercourse x 1 year, tracking cycles, OPK's x 7 months (getting positive results). Has been having intercourse the day of and after positive ovulation test. Neither have children. Nullip. Both non-smokers. Cycles monthly, but range 28-32. Menarche- 15  Review of Systems   Constitutional: Negative for appetite change and fatigue. HENT: Negative for congestion and hearing loss. Eyes: Negative for visual disturbance. Respiratory: Negative for cough and shortness of breath. Cardiovascular: Negative for chest pain and palpitations. Gastrointestinal: Negative for abdominal distention, abdominal pain, constipation and diarrhea. Genitourinary: Negative for flank pain, frequency, menstrual problem, pelvic pain and vaginal discharge. Musculoskeletal: Negative for back pain. Neurological: Negative for syncope and headaches. Psychiatric/Behavioral: Negative for behavioral problems. Vitals:    08/05/20 0915   BP: 112/60   Position: Sitting   Cuff Size: Medium Adult   Weight: 132 lb (59.9 kg)     Current Outpatient Medications   Medication Sig Dispense Refill    cetirizine (ZYRTEC) 10 MG tablet Take 10 mg by mouth daily      ALPRAZolam (XANAX) 0.25 MG tablet Take 0.25 mg by mouth nightly as needed for Sleep. .       No current facility-administered medications for this visit. Allergies   Allergen Reactions    Shellfish-Derived Products        Objective:   Physical Exam  Constitutional:       Appearance: Normal appearance. She is normal weight. HENT:      Head: Normocephalic. Nose: Nose normal.   Eyes:      Extraocular Movements: Extraocular movements intact.       Conjunctiva/sclera: Conjunctivae normal.   Neck:      Musculoskeletal: Normal range of

## 2020-08-24 LAB — TSH SERPL DL<=0.05 MIU/L-ACNC: 2.9 UIU/ML

## 2020-08-25 ENCOUNTER — TELEPHONE (OUTPATIENT)
Dept: OBGYN CLINIC | Age: 30
End: 2020-08-25

## 2020-08-25 NOTE — TELEPHONE ENCOUNTER
US prior to initiating any ovulation induction meds. I guess it's fine if she would like to schedule in the next 2-4 weeks.     So- pelvic US, Dx infertility

## 2020-08-25 NOTE — TELEPHONE ENCOUNTER
Patient called asking about the [address]. Told her that we will be waiting until we get the SA results back. She is still wondering if she can get the [address] [facility] scheduled just in case because her availability is very limited. Not sure what US she will be getting?

## 2020-08-26 NOTE — TELEPHONE ENCOUNTER
Pt called back to say that her  is going to [facility] on Friday 08/28/20. She is unable to come to office for [address] on the dates the Radha is in and would like to do on a Monday. Requests to have orders emailed to her and she'll schedule at [facility].

## 2020-09-09 ENCOUNTER — TELEPHONE (OUTPATIENT)
Dept: OBGYN CLINIC | Age: 30
End: 2020-09-09

## 2020-10-29 ENCOUNTER — TELEPHONE (OUTPATIENT)
Dept: OBGYN CLINIC | Age: 30
End: 2020-10-29

## 2020-10-29 NOTE — TELEPHONE ENCOUNTER
She could definitely get it done here, but I would probably wait until after consult- in case she either doesn't need it or they have a specific way of testing- in the case of IVF. If she finds out that she needs to have them done, we will get it ordered and scheduled as quickly as possible.

## 2020-10-29 NOTE — TELEPHONE ENCOUNTER
Report from andrologist shows abnormal morphology. Sperm morphology (normal forms) should be >4 and his was 2. I would recommend consult w/STUART to at least discuss options.

## 2020-10-29 NOTE — TELEPHONE ENCOUNTER
Received SA results from  Rachel You [birth year]  Results scanned in HIS chart under media    Please review

## 2020-10-29 NOTE — TELEPHONE ENCOUNTER
Pt was called   Was wondering if she should still proceed with her testing? - has not got the HSG or US done

## 2020-11-17 ENCOUNTER — TELEPHONE (OUTPATIENT)
Dept: OBGYN CLINIC | Age: 30
End: 2020-11-17

## 2020-11-17 NOTE — TELEPHONE ENCOUNTER
Pt calling with some menstrual concerns - trying for pregnancy   Met with IVF 11/11 video consult  recommend IVF  Has only  2-3% naturally   8% with IUI   70% IVF  October's cycle started 2 days early Was due 10/19  Started 10/17-10/23 heavier than normal   +OPK 10/27 a week earlier than predicted  Was due to start November 11/13   Took UPT yesterday & was negative  Some cramping & fatigue   Please advise   Work number [phone] ext [phone]   best between 12:50-2:p00pm

## 2021-03-23 ENCOUNTER — TELEPHONE (OUTPATIENT)
Dept: PRIMARY CARE CLINIC | Age: 31
End: 2021-03-23

## 2021-03-29 ENCOUNTER — OFFICE VISIT (OUTPATIENT)
Dept: PODIATRY | Age: 31
End: 2021-03-29
Payer: OTHER GOVERNMENT

## 2021-03-29 ENCOUNTER — HOSPITAL ENCOUNTER (OUTPATIENT)
Age: 31
Setting detail: SPECIMEN
Discharge: HOME | End: 2021-03-29
Payer: OTHER GOVERNMENT

## 2021-03-29 VITALS — HEIGHT: 66 IN | BODY MASS INDEX: 21.21 KG/M2 | WEIGHT: 132 LBS | TEMPERATURE: 97.5 F

## 2021-03-29 DIAGNOSIS — B35.1 DERMATOPHYTOSIS OF NAIL: Primary | ICD-10-CM

## 2021-03-29 PROCEDURE — 11755 BIOPSY NAIL UNIT: CPT | Performed by: PODIATRIST

## 2021-03-29 PROCEDURE — 99203 OFFICE O/P NEW LOW 30 MIN: CPT | Performed by: PODIATRIST

## 2021-03-29 RX ORDER — LETROZOLE 2.5 MG/1
5 TABLET, FILM COATED ORAL DAILY
COMMUNITY
Start: 2021-03-11 | End: 2021-08-03

## 2021-03-29 RX ORDER — METFORMIN HYDROCHLORIDE 500 MG/1
500 TABLET, EXTENDED RELEASE ORAL 2 TIMES DAILY
COMMUNITY
Start: 2021-03-11 | End: 2022-02-21 | Stop reason: CLARIF

## 2021-03-29 NOTE — PROGRESS NOTES
needed for Sleep. .   Yes Historical Provider, MD       Past Surgical History:   Procedure Laterality Date    TONSILLECTOMY AND ADENOIDECTOMY  1999    WISDOM TOOTH EXTRACTION         Family History   Problem Relation Age of Onset    High Blood Pressure Father     High Cholesterol Father     Cancer Other        Social History     Tobacco Use    Smoking status: Never Smoker    Smokeless tobacco: Never Used   Substance Use Topics    Alcohol use: Yes       Review of Systems    Review of Systems:   History obtained from chart review and the patient  General ROS: negative for - chills, fatigue, fever, night sweats or weight gain  Constitutional: Negative for chills, diaphoresis, fatigue, fever and unexpected weight change. Musculoskeletal: Positive for arthralgias, gait problem and joint swelling. Neurological ROS: negative for - behavioral changes, confusion, headaches or seizures. Negative for weakness and numbness. Dermatological ROS: negative for - mole changes, rash  Cardiovascular: Negative for leg swelling. Gastrointestinal: Negative for constipation, diarrhea, nausea and vomiting. Lower Extremity Physical Examination:   Vitals:   Vitals:    03/29/21 1024   Temp: 97.5 °F (36.4 °C)     General: AAO x 3 in NAD. Dermatologic Exam:  Yellow thickened dystropic great toe nails right and left with subungual debris. There is brittleness noted. Musculoskeletal:     1st MPJ ROM decreased, Bilateral.  Muscle strength 5/5, Bilateral.  Pain present upon palpation of right and left great toenails . Medial longitudinal arch, Bilateral WNL.   Ankle ROM WNL,Bilateral.    Dorsally contracted digits absent digits 1-5 Bilateral.     Vascular: DP and PT pulses palpable 2/4, Bilateral.  CFT <3 seconds, Bilateral.  Hair growth present to the level of the digits, Bilateral.  Edema absent, Bilateral.  Varicosities absent, Bilateral. Erythema absent, Bilateral    Neurological: Sensation intact to light touch to level of digits, Bilateral.  Protective sensation intact 10/10 sites via 5.07/10g [place] Monofilament, Bilateral.  negative Tinel's, Bilateral.  negative Valleix sign, Bilateral.      Integument: Warm, dry, supple, Bilateral.  Open lesion absent, Bilateral.  Interdigital maceration absent to web spaces 1-4, Bilateral.  Toenails 1 alyssa are thickened yellow and dystropic  With subungual debris     Asessment: Patient is a 31 y.o. female with:    Diagnosis Orders   1. Dermatophytosis of nail  SC BIOPSY, NAIL UNIT (SEP PROC)         Plan: Patient examined and evaluated. Current condition and treatment options discussed in detail. Discussed conservative and surgical options with the patient. Advised pt to her conditon    A nail biopsy was performed at the proximal nail border of the right great toe. The specimen was sent to pathology for identificaiton and PAS stain. Verbal and written instructions given to patient. Contact office with any questions/problems/concerns. Will call patient with results    All labs were reviewed and all imagining including the above findings were reviewed PRIOR to the patients arrival and with the patient today. Previous patient encounter was reviewed. Encounters from the patients other medical providers were reviewed and noted. Time was spent educating the patient and their families/caregivers on proper care of the feet and ankles. All the above diagnosis were addressed at todays visit and all questions were answered.      3/29/2021    Electronically signed by Osbaldo Collins DPM on 3/29/2021 at 10:32 AM  3/29/2021

## 2021-03-30 ENCOUNTER — OFFICE VISIT (OUTPATIENT)
Dept: PRIMARY CARE CLINIC | Age: 31
End: 2021-03-30
Payer: OTHER GOVERNMENT

## 2021-03-30 VITALS
TEMPERATURE: 97.3 F | DIASTOLIC BLOOD PRESSURE: 68 MMHG | RESPIRATION RATE: 14 BRPM | HEIGHT: 66 IN | HEART RATE: 68 BPM | SYSTOLIC BLOOD PRESSURE: 116 MMHG | BODY MASS INDEX: 21.86 KG/M2 | WEIGHT: 136 LBS

## 2021-03-30 DIAGNOSIS — E88.81 INSULIN RESISTANCE: Primary | ICD-10-CM

## 2021-03-30 PROCEDURE — 99395 PREV VISIT EST AGE 18-39: CPT | Performed by: FAMILY MEDICINE

## 2021-03-30 ASSESSMENT — PATIENT HEALTH QUESTIONNAIRE - PHQ9
2. FEELING DOWN, DEPRESSED OR HOPELESS: SEVERAL DAYS
DEPRESSION UNABLE TO ASSESS: YES

## 2021-03-30 ASSESSMENT — ENCOUNTER SYMPTOMS
ALLERGIC/IMMUNOLOGIC NEGATIVE: 1
GASTROINTESTINAL NEGATIVE: 1
RESPIRATORY NEGATIVE: 1

## 2021-03-30 NOTE — PATIENT INSTRUCTIONS
Discussed nutritional education today. Instructed patient to contact endocrinology office to set up an appointment with dietician to further discuss diabetes education.

## 2021-03-30 NOTE — PROGRESS NOTES
Subjective:      Patient ID: Chris Wu is a 31 y.o. female. Patient presents to office for a well visit. Patient inquiring a second opinion about insulin resistance diagnosis from Reproductive Endocrinologist.      Review of Systems   Constitutional: Negative. HENT: Negative. Respiratory: Negative. Cardiovascular: Negative. Gastrointestinal: Negative. Endocrine: Negative. Genitourinary: Negative. Musculoskeletal: Negative. Allergic/Immunologic: Negative. Neurological: Negative. Hematological: Negative. Psychiatric/Behavioral: The patient is nervous/anxious. Objective:   Physical Exam  Constitutional:       Appearance: Normal appearance. She is normal weight. HENT:      Head: Normocephalic. Nose: Nose normal.      Mouth/Throat:      Mouth: Mucous membranes are moist.      Pharynx: Oropharynx is clear. Neck:      Musculoskeletal: Normal range of motion and neck supple. Cardiovascular:      Rate and Rhythm: Normal rate. Pulses: Normal pulses. Heart sounds: Normal heart sounds. Pulmonary:      Effort: Pulmonary effort is normal.      Breath sounds: Normal breath sounds. Abdominal:      General: Abdomen is flat. Bowel sounds are normal.      Palpations: Abdomen is soft. Musculoskeletal: Normal range of motion. Skin:     General: Skin is warm and dry. Neurological:      General: No focal deficit present. Mental Status: She is alert and oriented to person, place, and time. Psychiatric:      Comments: Tearful and anxious when discussing insulin resistance diagnosis         Assessment:      1. Insulin resistance            Plan:      Fahad Miller was seen today for established new doctor, other and health maintenance.     Diagnoses and all orders for this visit:    Insulin resistance                Electronically signed by Rosio López MD on 3/30/2021 at 9:11 AM

## 2021-04-26 LAB
CULTURE: NORMAL
Lab: NORMAL
SPECIMEN DESCRIPTION: NORMAL

## 2021-08-03 ENCOUNTER — OFFICE VISIT (OUTPATIENT)
Dept: PRIMARY CARE CLINIC | Age: 31
End: 2021-08-03
Payer: OTHER GOVERNMENT

## 2021-08-03 VITALS
BODY MASS INDEX: 20.06 KG/M2 | HEART RATE: 83 BPM | HEIGHT: 66 IN | DIASTOLIC BLOOD PRESSURE: 70 MMHG | SYSTOLIC BLOOD PRESSURE: 110 MMHG | OXYGEN SATURATION: 98 % | WEIGHT: 124.8 LBS

## 2021-08-03 DIAGNOSIS — K59.00 CONSTIPATION, UNSPECIFIED CONSTIPATION TYPE: Primary | ICD-10-CM

## 2021-08-03 PROCEDURE — 99213 OFFICE O/P EST LOW 20 MIN: CPT | Performed by: FAMILY MEDICINE

## 2021-08-03 RX ORDER — CHORIOGONADOTROPIN ALFA 250 UG/.5ML
INJECTION, SOLUTION SUBCUTANEOUS
COMMUNITY
Start: 2021-06-08 | End: 2022-08-12 | Stop reason: ALTCHOICE

## 2021-08-03 RX ORDER — LETROZOLE 2.5 MG/1
5 TABLET, FILM COATED ORAL DAILY
COMMUNITY
Start: 2021-05-04 | End: 2022-08-12 | Stop reason: ALTCHOICE

## 2021-08-03 RX ORDER — PROGESTERONE 100 MG/1
CAPSULE ORAL
COMMUNITY
Start: 2021-06-06 | End: 2022-08-12 | Stop reason: ALTCHOICE

## 2021-08-03 SDOH — ECONOMIC STABILITY: FOOD INSECURITY: WITHIN THE PAST 12 MONTHS, THE FOOD YOU BOUGHT JUST DIDN'T LAST AND YOU DIDN'T HAVE MONEY TO GET MORE.: NEVER TRUE

## 2021-08-03 SDOH — ECONOMIC STABILITY: FOOD INSECURITY: WITHIN THE PAST 12 MONTHS, YOU WORRIED THAT YOUR FOOD WOULD RUN OUT BEFORE YOU GOT MONEY TO BUY MORE.: NEVER TRUE

## 2021-08-03 ASSESSMENT — ENCOUNTER SYMPTOMS
CONSTIPATION: 1
DIARRHEA: 1
EYES NEGATIVE: 1
RESPIRATORY NEGATIVE: 1
ALLERGIC/IMMUNOLOGIC NEGATIVE: 1

## 2021-08-03 ASSESSMENT — SOCIAL DETERMINANTS OF HEALTH (SDOH): HOW HARD IS IT FOR YOU TO PAY FOR THE VERY BASICS LIKE FOOD, HOUSING, MEDICAL CARE, AND HEATING?: NOT HARD AT ALL

## 2021-08-03 NOTE — PROGRESS NOTES
take OTC Miralax prn. Patient also provided with a list of methods discussed during appointment. Patient verbalized understanding. Patient to follow-up as needed.           Electronically signed by Shawna Lombardo MD on 8/3/2021 at 10:27 AM

## 2021-12-28 ENCOUNTER — TELEPHONE (OUTPATIENT)
Dept: PRIMARY CARE CLINIC | Age: 31
End: 2021-12-28

## 2021-12-29 RX ORDER — TAMSULOSIN HYDROCHLORIDE 0.4 MG/1
0.4 CAPSULE ORAL DAILY
Qty: 14 CAPSULE | Refills: 0 | Status: SHIPPED | OUTPATIENT
Start: 2021-12-29 | End: 2022-02-21 | Stop reason: CLARIF

## 2021-12-29 NOTE — TELEPHONE ENCOUNTER
Patient took last flomax pill today from the ER.  Req refill to [facility] Please advise patient if unable to send refill     0.4 mg 1 daily     Cannot pend because we did not do initial order

## 2022-02-18 ASSESSMENT — ENCOUNTER SYMPTOMS
CONSTIPATION: 0
ABDOMINAL DISTENTION: 0
ABDOMINAL PAIN: 0
COUGH: 0
BACK PAIN: 0
DIARRHEA: 0
SHORTNESS OF BREATH: 0

## 2022-02-18 NOTE — PROGRESS NOTES
[facility] - [address] [address]  Dept: [phone]    DATE OF VISIT:  22        History and Physical    Ho Cui    :  1990  CHIEF COMPLAINT:  No chief complaint on file. HPI :   Ho Cui is a 36 y.o. female here for her annual exam.     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 No children. Has been exercising more and eats healthy. Has been seeing Dr. Josette Whitmore for fertility at [facility] in [place]. Sperm morphology abnormal.  Planning her 5th round of letrozole and if not successful, will discuss IVF. Declines pap/pelvic today- just started her cycle. Menarche at 17. She is sexually active. She uses nothing for contraception desires pregnancy. Periods are monthly, occurring every 24-30 days and lasting 5 days.  She denies HMB and denies dysmenorrhea.   _____________________________________________________________________  Past Medical History:   Diagnosis Date    Anxiety     C. difficile colitis     13YEARS OLD    Depression     Environmental allergies     Seasonal allergic rhinitis 2016                                                                   Past Surgical History:   Procedure Laterality Date    TONSILLECTOMY AND ADENOIDECTOMY      WISDOM TOOTH EXTRACTION       Family History   Problem Relation Age of Onset    High Blood Pressure Father     High Cholesterol Father     Cancer Other      Social History     Tobacco Use   Smoking Status Never Smoker   Smokeless Tobacco Never Used     Social History     Substance and Sexual Activity   Alcohol Use Yes     Current Outpatient Medications   Medication Sig Dispense Refill    tamsulosin (FLOMAX) 0.4 MG capsule Take 1 capsule by mouth daily for 14 days 14 capsule 0    OVIDREL 250 MCG/0.5ML INJ INJECT 1 PREFILLED SYRINGE SUBCUTANEOUSLY AS DIRECTED BY PHYSICIAN FOR A SINGLE DOSE  letrozole (FEMARA) 2.5 MG tablet Take 5 mg by mouth daily      progesterone (PROMETRIUM) 100 MG CAPS capsule insert 1 tablet vaginally twice a day WHEN INSTRUCTED BY DOCTOR      metFORMIN (GLUCOPHAGE-XR) 500 MG extended release tablet Take 500 mg by mouth 2 times daily      cetirizine (ZYRTEC) 10 MG tablet Take 10 mg by mouth daily      ALPRAZolam (XANAX) 0.25 MG tablet Take 0.25 mg by mouth nightly as needed for Sleep. .       No current facility-administered medications for this visit. Allergies: Allergies   Allergen Reactions    Shellfish-Derived Products        Gynecologic History:  No LMP recorded. Sexually Active: Yes  How many partners in the last 12 months- 1  Partners:   Dyspareunia: denies  STD History:No  Birth Control: No  Pap History: 20 NILM. Cytology/cotest due   Gardasil: complete  Family hx Breast, Ovarian, Colorectal Cancer: denies       OB History    Para Term  AB Living   0 0 0 0 0 0   SAB IAB Ectopic Molar Multiple Live Births   0 0 0 0 0 0       Review of Systems   Constitutional: Negative for appetite change and fatigue. HENT: Negative for congestion and hearing loss. Eyes: Negative for visual disturbance. Respiratory: Negative for cough and shortness of breath. Cardiovascular: Negative for chest pain and palpitations. Gastrointestinal: Negative for abdominal distention, abdominal pain, constipation and diarrhea. Genitourinary: Negative for flank pain, frequency, menstrual problem, pelvic pain and vaginal discharge. Musculoskeletal: Negative for back pain. Neurological: Negative for syncope and headaches. Psychiatric/Behavioral: Negative for behavioral problems. There were no vitals taken for this visit. Physical Exam  Constitutional:       Appearance: She is well-developed. HENT:      Head: Normocephalic. Eyes:      Extraocular Movements: Extraocular movements intact.       Conjunctiva/sclera: Conjunctivae normal.   Neck:      Thyroid: No thyromegaly. Trachea: No tracheal deviation. Pulmonary:      Effort: Pulmonary effort is normal. No respiratory distress. Chest:   Breasts: Breasts are symmetrical.      Right: No inverted nipple. Left: No inverted nipple, mass, nipple discharge, skin change or tenderness. Abdominal:      General: There is no distension. Palpations: Abdomen is soft. There is no mass. Tenderness: There is no abdominal tenderness. Musculoskeletal:         General: No tenderness. Normal range of motion. Cervical back: Normal range of motion. Skin:     General: Skin is warm and dry. Neurological:      General: No focal deficit present. Mental Status: She is alert and oriented to person, place, and time. Mental status is at baseline. Psychiatric:         Mood and Affect: Mood normal.         Behavior: Behavior normal.         Thought Content: Thought content normal.         Judgment: Judgment normal.             ASSESSMENT:     36 y.o. Female; Annual   Diagnosis Orders   1. Encounter for gynecological examination     2. Infertility, female       No follow-ups on file. PLAN:  - Pap not collected per ASCCP Guidelines. - Birth control discussed. - Smoking risk factors discussed  - Diet and exercise reviewed. - Routine health maintenance per patient's PCP.     Electronically signed by DENTON Yung CNP on 2/18/22 at 12:50 PM [address] [facility] - [place]

## 2022-02-21 ENCOUNTER — OFFICE VISIT (OUTPATIENT)
Dept: OBGYN CLINIC | Age: 32
End: 2022-02-21
Payer: OTHER GOVERNMENT

## 2022-02-21 VITALS
HEIGHT: 66 IN | SYSTOLIC BLOOD PRESSURE: 110 MMHG | BODY MASS INDEX: 22.79 KG/M2 | DIASTOLIC BLOOD PRESSURE: 82 MMHG | WEIGHT: 141.8 LBS

## 2022-02-21 DIAGNOSIS — N97.9 INFERTILITY, FEMALE: ICD-10-CM

## 2022-02-21 DIAGNOSIS — Z01.419 ENCOUNTER FOR GYNECOLOGICAL EXAMINATION: Primary | ICD-10-CM

## 2022-02-21 PROCEDURE — 99395 PREV VISIT EST AGE 18-39: CPT | Performed by: NURSE PRACTITIONER

## 2022-02-21 NOTE — PROGRESS NOTES
Clement FlyChaperone for Intimate Exam   Chaperone was offered as part of the rooming process. Patient declined and agrees to continue with exam without a chaperone.    Chaperone: NONE

## 2022-08-12 ENCOUNTER — HOSPITAL ENCOUNTER (OUTPATIENT)
Age: 32
Setting detail: SPECIMEN
Discharge: HOME | End: 2022-08-12

## 2022-08-12 ENCOUNTER — ROUTINE PRENATAL (OUTPATIENT)
Dept: OBGYN CLINIC | Age: 32
End: 2022-08-12

## 2022-08-12 VITALS
BODY MASS INDEX: 22.62 KG/M2 | WEIGHT: 140.13 LBS | SYSTOLIC BLOOD PRESSURE: 110 MMHG | DIASTOLIC BLOOD PRESSURE: 62 MMHG

## 2022-08-12 DIAGNOSIS — Z3A.12 12 WEEKS GESTATION OF PREGNANCY: ICD-10-CM

## 2022-08-12 DIAGNOSIS — O09.90 HIGH RISK PREGNANCY, ANTEPARTUM: ICD-10-CM

## 2022-08-12 DIAGNOSIS — O09.90 HIGH RISK PREGNANCY, ANTEPARTUM: Primary | ICD-10-CM

## 2022-08-12 DIAGNOSIS — O09.819 TWIN PREGNANCY RESULTING FROM ASSISTED REPRODUCTIVE TECHNOLOGY (ART): ICD-10-CM

## 2022-08-12 DIAGNOSIS — O30.009 TWIN PREGNANCY RESULTING FROM ASSISTED REPRODUCTIVE TECHNOLOGY (ART): ICD-10-CM

## 2022-08-12 PROBLEM — N97.9 FEMALE INFERTILITY: Status: ACTIVE | Noted: 2021-06-06

## 2022-08-12 PROBLEM — N89.8 VAGINAL ITCHING: Status: RESOLVED | Noted: 2019-04-02 | Resolved: 2022-08-12

## 2022-08-12 PROBLEM — N92.6 IRREGULAR PERIODS: Status: RESOLVED | Noted: 2017-11-09 | Resolved: 2022-08-12

## 2022-08-12 PROBLEM — O30.041 DICHORIONIC DIAMNIOTIC TWIN PREGNANCY IN FIRST TRIMESTER: Status: ACTIVE | Noted: 2022-07-05

## 2022-08-12 LAB
ABO/RH: NORMAL
ABSOLUTE EOS #: 0.22 K/UL (ref 0–0.44)
ABSOLUTE IMMATURE GRANULOCYTE: 0.03 K/UL (ref 0–0.3)
ABSOLUTE LYMPH #: 1.45 K/UL (ref 1.1–3.7)
ABSOLUTE MONO #: 0.73 K/UL (ref 0.1–1.2)
AMPHETAMINE SCREEN URINE: NEGATIVE
ANTIBODY SCREEN: NEGATIVE
BARBITURATE SCREEN URINE: NEGATIVE
BASOPHILS # BLD: 0 % (ref 0–2)
BASOPHILS ABSOLUTE: 0.03 K/UL (ref 0–0.2)
BENZODIAZEPINE SCREEN, URINE: NEGATIVE
CANNABINOID SCREEN URINE: NEGATIVE
COCAINE METABOLITE, URINE: NEGATIVE
EOSINOPHILS RELATIVE PERCENT: 3 % (ref 1–4)
FENTANYL URINE: NEGATIVE
HCT VFR BLD CALC: 38.2 % (ref 36.3–47.1)
HEMOGLOBIN: 12.3 G/DL (ref 11.9–15.1)
HEPATITIS B SURFACE ANTIGEN: NONREACTIVE
HEPATITIS C ANTIBODY: NONREACTIVE
HIV AG/AB: NONREACTIVE
IMMATURE GRANULOCYTES: 0 %
LYMPHOCYTES # BLD: 17 % (ref 24–43)
MCH RBC QN AUTO: 30.4 PG (ref 25.2–33.5)
MCHC RBC AUTO-ENTMCNC: 32.2 G/DL (ref 28.4–34.8)
MCV RBC AUTO: 94.6 FL (ref 82.6–102.9)
METHADONE SCREEN, URINE: NEGATIVE
MONOCYTES # BLD: 9 % (ref 3–12)
NRBC AUTOMATED: 0 PER 100 WBC
OPIATES, URINE: NEGATIVE
OXYCODONE SCREEN URINE: NEGATIVE
PDW BLD-RTO: 12.9 % (ref 11.8–14.4)
PHENCYCLIDINE, URINE: NEGATIVE
PLATELET # BLD: 252 K/UL (ref 138–453)
PMV BLD AUTO: 11.5 FL (ref 8.1–13.5)
RBC # BLD: 4.04 M/UL (ref 3.95–5.11)
RUBV IGG SER QL: 132.1 IU/ML
SEG NEUTROPHILS: 71 % (ref 36–65)
SEGMENTED NEUTROPHILS ABSOLUTE COUNT: 5.92 K/UL (ref 1.5–8.1)
T. PALLIDUM, IGG: NONREACTIVE
TEST INFORMATION: NORMAL
WBC # BLD: 8.4 K/UL (ref 3.5–11.3)

## 2022-08-12 PROCEDURE — 0502F SUBSEQUENT PRENATAL CARE: CPT | Performed by: ADVANCED PRACTICE MIDWIFE

## 2022-08-12 SDOH — ECONOMIC STABILITY: FOOD INSECURITY: WITHIN THE PAST 12 MONTHS, THE FOOD YOU BOUGHT JUST DIDN'T LAST AND YOU DIDN'T HAVE MONEY TO GET MORE.: NEVER TRUE

## 2022-08-12 SDOH — ECONOMIC STABILITY: FOOD INSECURITY: WITHIN THE PAST 12 MONTHS, YOU WORRIED THAT YOUR FOOD WOULD RUN OUT BEFORE YOU GOT MONEY TO BUY MORE.: NEVER TRUE

## 2022-08-12 ASSESSMENT — PATIENT HEALTH QUESTIONNAIRE - PHQ9
1. LITTLE INTEREST OR PLEASURE IN DOING THINGS: 0
6. FEELING BAD ABOUT YOURSELF - OR THAT YOU ARE A FAILURE OR HAVE LET YOURSELF OR YOUR FAMILY DOWN: 0
SUM OF ALL RESPONSES TO PHQ QUESTIONS 1-9: 0
7. TROUBLE CONCENTRATING ON THINGS, SUCH AS READING THE NEWSPAPER OR WATCHING TELEVISION: 0
5. POOR APPETITE OR OVEREATING: 0
SUM OF ALL RESPONSES TO PHQ QUESTIONS 1-9: 0
SUM OF ALL RESPONSES TO PHQ QUESTIONS 1-9: 0
4. FEELING TIRED OR HAVING LITTLE ENERGY: 0
SUM OF ALL RESPONSES TO PHQ9 QUESTIONS 1 & 2: 0
3. TROUBLE FALLING OR STAYING ASLEEP: 0
2. FEELING DOWN, DEPRESSED OR HOPELESS: 0
9. THOUGHTS THAT YOU WOULD BE BETTER OFF DEAD, OR OF HURTING YOURSELF: 0
10. IF YOU CHECKED OFF ANY PROBLEMS, HOW DIFFICULT HAVE THESE PROBLEMS MADE IT FOR YOU TO DO YOUR WORK, TAKE CARE OF THINGS AT HOME, OR GET ALONG WITH OTHER PEOPLE: 0
8. MOVING OR SPEAKING SO SLOWLY THAT OTHER PEOPLE COULD HAVE NOTICED. OR THE OPPOSITE, BEING SO FIGETY OR RESTLESS THAT YOU HAVE BEEN MOVING AROUND A LOT MORE THAN USUAL: 0
SUM OF ALL RESPONSES TO PHQ QUESTIONS 1-9: 0

## 2022-08-12 ASSESSMENT — SOCIAL DETERMINANTS OF HEALTH (SDOH): HOW HARD IS IT FOR YOU TO PAY FOR THE VERY BASICS LIKE FOOD, HOUSING, MEDICAL CARE, AND HEATING?: NOT HARD AT ALL

## 2022-08-12 NOTE — PROGRESS NOTES
[address] [facility] [facility]N [address]  [address] [facility]  Dept: [phone]    New Obstetric Intake     Subjective:    Patient Name:Radha Butler  Patient Age: 36 y.o. Date of Visit: 2022  Patient's estimated gestational age at visit: 12w0d      Any Concerns Today: no  Patient reports no complaints. She denies spotting, cramping or pain.     OB History          1    Para   0    Term   0       0    AB   0    Living             SAB   0    IAB   0    Ectopic   0    Molar        Multiple        Live Births                    Information about this pregnancy:   Planned Pregnancy: Yes, Accepting: Yes  Father of Baby: Gloria Marquez and is involved with the pregnancy  Patient's last menstrual period was 2022., Regular menses: Yes  Date of Last Pap Smear: up to date normal  On Birth Control at Time of Conception: no  Early Dating Ultrasound: completed  Desires first trimester screening: Yes  Desires CF/SMA/FragileX screening: No known negative    Risk Screening  Patient Occupation: teacher, Environmental/Work Hazards: Yes  Smoker: No  History of Substance Abuse: no  History of Sexual Abuse: no  Current of past history of intimate partner violence: no  Teratogen Exposure since finding out pregnant: no  Pets at home: yes - 1 dog    Infection History:  Personal History of Chicken Pox or varicella vaccination: Yes  Exposed to TB or live with someone with TB: no  Patient or partner history of genital herpes: no  History of sexually transmitted infection(s) (STIs): no   Any recent travel within the last 3 months out of the country: yes - [place] in , Partner: yes - [place] in     Previous Birth History:   Vaginal Birth: N/A   Birth: N/A  Any previous birth complications: N/A  History of hemorrhage during/after birth: N/A    High Risk Factor Screening for this Pregnancy:  Age greater than 28 at delivery: No  History of  delivery: n/a  History of diabetes (gestational or outside of pregnancy): No, On medications: NA  Screening for pregestational diabetes:   BMI greater than 30: No. If Yes, need early glucola  History of Hypertension: No, On medications: NA  History of bleeding disorders: No  History of migraine headaches: No      See Epic Navigator for further genetic and risk screening. Objective:  /62   Wt 140 lb 2 oz (63.6 kg)   LMP 2022   BMI 22.62 kg/m²   Body mass index is 22.62 kg/m². Physical Exam  Vitals reviewed. Constitutional:       General: She is not in acute distress. Appearance: She is well-developed. She is not diaphoretic. Neck:      Thyroid: No thyromegaly or thyroid tenderness. Cardiovascular:      Rate and Rhythm: Normal rate and regular rhythm. Heart sounds: Normal heart sounds. Pulmonary:      Effort: Pulmonary effort is normal. No respiratory distress. Breath sounds: Normal breath sounds. Abdominal:      General: There is no distension. Palpations: Abdomen is soft. Tenderness: There is no abdominal tenderness. Musculoskeletal:         General: Normal range of motion. Cervical back: Normal range of motion. Skin:     General: Skin is warm and dry. Neurological:      Mental Status: She is alert and oriented to person, place, and time. Mental status is at baseline. Psychiatric:         Behavior: Behavior normal.         Thought Content: Thought content normal.         Judgment: Judgment normal.       Chaperone for Intimate Exam  Chaperone was offered as part of the rooming process. Patient declined and agrees to continue with exam without a chaperone. Chaperone: n/a    Mother's Ethnicity:   Father's Ethnicity:       Assessment/Plan:  Pregnancy at 12w0d   Role of ultrasound in pregnancy discussed; fetal survey: ordered  Due date has been established and is based off of IVF records  She was counseled on office policies. She was educated about the anticipated course of prenatal care. Warning signs were reviewed. The patient was counseled on drug screening, HIV, Cystic Fibrosis, SMA and Sickle Cell disease, as appropriate. She verbally consented to HIV testing and drug screening. She was counseled on Toxoplasmosis/Listeriosis (cats/raw meat). \  She denies tobacco use. The patient was counseled on the risks of tobacco abuse, both maternal and fetal. She was instructed to stop smoking if currently using tobacco. Morbidity, mortality, and cessation programs were reviewed. The risks include but are not limited to increased risks of  labor,  delivery, premature rupture of membranes, intrauterine growth restriction, intrauterine fetal demise and abruptio placenta. Secondary smoke risks were also reviewed. Increases in cancer, respiratory problems, and sudden infant death syndrome were reviewed as well. The patient was informed of a 2-4% risk of congenital anomalies in the general population. She was questioned in detail regarding any genetic misnomer history, chromosomal abnormalities, or learning disabilities in herself, the father of the baby or their families. She denies any history as stated above. Discussed in detail referral for genetic screening through [facility]. Discussed in detail referral/orders for  for 1st trimester screen vs NIPSinfo provided for screening  Discussed with patient that if they proceed with the NIPs testing then they will be opting out of 1st trimester screening which can provide information in regards to placental health, congenital heart defects, metabolic abnormalities, and anatomic abnormalities. She verbalizes understanding and would like to proceed with: 1st trimester screen  Route of delivery and counseling on vaginal, operative vaginal, and  sections were discussed. Further counseling will be handled by the provider at subsequent visits.    Encouraged well-balanced diet, plenty of rest when needed, prenatal vitamins daily and walking for exercise. Discussed self-help for nausea, avoiding OTC medications until consulting provider or pharmacist, other than Tylenol PRN, minimal caffeine (1-2 cups daily) and avoiding alcohol. Discussed exercise safety in pregnancy. 1. High risk pregnancy, antepartum  - Prenatal Profile I; Future  - HIV Screen; Future  - Urine Drug Screen; Future  - C.trachomatis N.gonorrhoeae DNA  - Varicella Zoster Antibody, IgG; Future  - Hepatitis C Antibody; Future  - C.trachomatis N.gonorrhoeae DNA, Urine; Future  - Culture, Urine; Future  - Prenatal type and screen; Future  - Ottoniel Albarado MD, Maternal Fetal Medicine, [facility]    2. 12 weeks gestation of pregnancy  - Reviewed warning signs    3. Twin pregnancy resulting from assisted reproductive technology (ART)  - Ottoniel Albarado MD, Maternal Fetal Medicine, [facility]    Upon completion of the visit all questions were answered. ROS was done and is negative except as documented in HPI. History was reviewed as documented on Epic Navigator. The patient, Ronna Culp, was seen with a total time spent of 25 minutes for the visit on this date of service by the [facility]  The time component involved both face-to-face (counseling and education) and non face-to-face time (care coordination), spent in determining the total time component. Return in about 4 weeks (around 9/9/2022) for Return OB, with Candi SPRING.     Electronically Signed by: Gary Miller

## 2022-08-13 LAB
CULTURE: NO GROWTH
SPECIMEN DESCRIPTION: NORMAL

## 2022-08-15 ENCOUNTER — ROUTINE PRENATAL (OUTPATIENT)
Dept: PERINATAL CARE | Age: 32
End: 2022-08-15
Payer: OTHER GOVERNMENT

## 2022-08-15 ENCOUNTER — HOSPITAL ENCOUNTER (OUTPATIENT)
Age: 32
Discharge: HOME | End: 2022-08-15
Payer: OTHER GOVERNMENT

## 2022-08-15 VITALS
WEIGHT: 142 LBS | BODY MASS INDEX: 22.82 KG/M2 | HEIGHT: 66 IN | HEART RATE: 107 BPM | DIASTOLIC BLOOD PRESSURE: 80 MMHG | TEMPERATURE: 97.2 F | RESPIRATION RATE: 16 BRPM | SYSTOLIC BLOOD PRESSURE: 118 MMHG

## 2022-08-15 DIAGNOSIS — O30.041 DICHORIONIC DIAMNIOTIC TWIN PREGNANCY IN FIRST TRIMESTER: Primary | ICD-10-CM

## 2022-08-15 DIAGNOSIS — Z36.9 FIRST TRIMESTER SCREENING: ICD-10-CM

## 2022-08-15 DIAGNOSIS — Z3A.12 12 WEEKS GESTATION OF PREGNANCY: ICD-10-CM

## 2022-08-15 DIAGNOSIS — O09.811 PREGNANCY RESULTING FROM IN VITRO FERTILIZATION IN FIRST TRIMESTER: ICD-10-CM

## 2022-08-15 PROBLEM — N76.0 ACUTE VAGINITIS: Status: RESOLVED | Noted: 2017-06-30 | Resolved: 2022-08-15

## 2022-08-15 LAB
C. TRACHOMATIS DNA ,URINE: NEGATIVE
CRL: NORMAL
CRL: NORMAL
N. GONORRHOEAE DNA, URINE: NEGATIVE
SAC DIAMETER: NORMAL
SAC DIAMETER: NORMAL
SPECIMEN DESCRIPTION: NORMAL
VZV IGG SER QL IA: 3.11

## 2022-08-15 PROCEDURE — 99999 PR OFFICE/OUTPT VISIT,PROCEDURE ONLY: CPT | Performed by: OBSTETRICS & GYNECOLOGY

## 2022-08-15 PROCEDURE — 76814 OB US NUCHAL MEAS ADD-ON: CPT | Performed by: OBSTETRICS & GYNECOLOGY

## 2022-08-15 PROCEDURE — 76802 OB US < 14 WKS ADDL FETUS: CPT | Performed by: OBSTETRICS & GYNECOLOGY

## 2022-08-15 PROCEDURE — 36415 COLL VENOUS BLD VENIPUNCTURE: CPT

## 2022-08-15 PROCEDURE — 76801 OB US < 14 WKS SINGLE FETUS: CPT | Performed by: OBSTETRICS & GYNECOLOGY

## 2022-08-15 PROCEDURE — 81508 FTL CGEN ABNOR TWO PROTEINS: CPT

## 2022-08-15 PROCEDURE — 76813 OB US NUCHAL MEAS 1 GEST: CPT | Performed by: OBSTETRICS & GYNECOLOGY

## 2022-08-15 RX ORDER — ASPIRIN 81 MG/1
81 TABLET, CHEWABLE ORAL DAILY
COMMUNITY

## 2022-08-23 LAB
AFP INTERPRETATION: NORMAL
CRL: 66 MM
CROWN RUMP LENGTH TWIN B: 63.3 MM
CROWN RUMP LENGTH: NORMAL
DATE OF BIRTH: NORMAL
DONOR EGG?: NORMAL
GESTATIONAL AGE: NORMAL
HISTORY OF ANEUPLOIDY?: NO
IN VITRO FERTILIZATION: NORMAL
MATERNAL AGE AT EDD: 30.5 YR
MATERNAL SCREEN, EER: NORMAL
MATERNAL WEIGHT: 142
MOM FOR NT, TWIN B: 1.02
MOM FOR NT: 1.37
MOM FOR PAPP-A: 4.26
MONOCHORIONIC TWINS: NORMAL
MSHCG-MOM: 2.38
MSHCG: NORMAL IU/L
NUCHAL TRANSLUC (NT): 2.1 MM
NUCHAL TRANSLUC (NT): NORMAL
NUCHAL TRANSLUCENCY TWIN B: 1.5 MM
NUMBER OF FETUSES: 2
NUMBER OF FETUSES: NORMAL
PAPP-A MOM: 4716 NG/ML
PATIENT WEIGHT UNITS: NORMAL
PATIENT WEIGHT: NORMAL
PREV TRISOMY PREG: NORMAL
RACE (MATERNAL): NORMAL
RACE: NORMAL
READING MD CERT NUM: NORMAL
READING MD NAME: NORMAL
REPEAT SPECIMEN?: NORMAL
SMOKING: NO
SMOKING: NO
SONOGRAPHER CERT NO: NORMAL
SONOGRAPHER CERT NO: NORMAL
SONOGRAPHER NAME: NORMAL
SONOGRAPHER NAME: NORMAL
SPECIMEN: NORMAL
ULTRASOUND DATE: NORMAL
ULTRASOUND DATE: NORMAL

## 2022-09-09 ENCOUNTER — ROUTINE PRENATAL (OUTPATIENT)
Dept: OBGYN CLINIC | Age: 32
End: 2022-09-09
Payer: OTHER GOVERNMENT

## 2022-09-09 VITALS — SYSTOLIC BLOOD PRESSURE: 108 MMHG | DIASTOLIC BLOOD PRESSURE: 64 MMHG | WEIGHT: 144 LBS | BODY MASS INDEX: 23.24 KG/M2

## 2022-09-09 DIAGNOSIS — O30.042 DICHORIONIC DIAMNIOTIC TWIN PREGNANCY IN SECOND TRIMESTER: ICD-10-CM

## 2022-09-09 DIAGNOSIS — O09.90 HIGH RISK PREGNANCY, ANTEPARTUM: Primary | ICD-10-CM

## 2022-09-09 DIAGNOSIS — Z23 NEEDS FLU SHOT: ICD-10-CM

## 2022-09-09 DIAGNOSIS — Z3A.16 16 WEEKS GESTATION OF PREGNANCY: ICD-10-CM

## 2022-09-09 DIAGNOSIS — O09.819 TWIN PREGNANCY RESULTING FROM ASSISTED REPRODUCTIVE TECHNOLOGY (ART): ICD-10-CM

## 2022-09-09 DIAGNOSIS — O30.009 TWIN PREGNANCY RESULTING FROM ASSISTED REPRODUCTIVE TECHNOLOGY (ART): ICD-10-CM

## 2022-09-09 PROCEDURE — 0502F SUBSEQUENT PRENATAL CARE: CPT | Performed by: ADVANCED PRACTICE MIDWIFE

## 2022-09-09 PROCEDURE — 90674 CCIIV4 VAC NO PRSV 0.5 ML IM: CPT | Performed by: ADVANCED PRACTICE MIDWIFE

## 2022-09-09 PROCEDURE — 90471 IMMUNIZATION ADMIN: CPT | Performed by: ADVANCED PRACTICE MIDWIFE

## 2022-09-09 NOTE — PROGRESS NOTES
The patient requested to have the Flu vaccine. Consent obtained. Injection of 0.5mL given Left deltoid Intramuscular. Lot #:[id number]  EXP:6/30/23  Patient tolerated well.

## 2022-09-09 NOTE — PROGRESS NOTES
SUBJECTIVE:    Varghese Keller is here for her return OB visit. denies concerns today. She denies  feeling fetal movement. She denies vaginal bleeding. She denies vaginal discharge. She denies leaking of fluid. She denies uterine cramping. She denies  nausea and/or vomiting. OBJECTIVE:  Blood pressure 108/64, weight 144 lb (65.3 kg), last menstrual period 02/20/2022, not currently breastfeeding. Total weight gain: 4 lb (1.814 kg)    Varghese Keller accepted the flu vaccine as appropriate. Varghese Keller accepted the COVID vaccine as appropriate    ASSESSMENT/PLAN:  IUP @ 16+0 weeks  S =D    High Risk Pregnancy  Due date is based on IVF transfer  Patient's prenatal labs are completed. Patient's blood type A positive and rhogam is not indicated in pregnancy. Early glucola indicated: no  Patient accepted genetic screening. First trimester screen is completed and normal.   Anatomy scan scheduled 9/27/22. Total weight gain in pregnancy reviewed: Yes and reviewed with patient and partner. Patient reports does not have nausea just food aversions. Reviewed eating to taste and trying to increase protein into diet, such as adding a protein shake in between meals but not replacing a meal for a protein shake. Reviewed water intake  Reviewed MFM process with our office as a consulting physician     2. 16 weeks gestation of pregnancy  - Reviewed movement  - Reviewed warning signs  - Alpha Fetoprotein, Maternal; Future    3. Twin pregnancy resulting from assisted reproductive technology (ART)    4.  Dichorionic diamniotic twin pregnancy in second trimester      She was counseled regarding all of the above:    Return in about 4 weeks (around 10/7/2022) for Return OB, with .    The patient, Paola Oliver,  was seen with a total time spent of 25 minutes for the visit on this date of service by the [facility]  On this date September 9, 2022,  I have spent greater than 50% of this visit:  [x] reviewing previous notes  [x] reviewing test results  [x] pre-charting  Discussed with patient:  [x] Importance of compliance with treatment plan  [x] Counseling  [] Coordinating care  [x] Documenting     Electronically Signed By: Gary Tubbs

## 2022-09-12 LAB
ABDOMINAL CIRCUMFERENCE: NORMAL
BIPARIETAL DIAMETER: NORMAL
ESTIMATED FETAL WEIGHT: NORMAL
FEMORAL DIAMETER: NORMAL
HC/AC: NORMAL
HEAD CIRCUMFERENCE: NORMAL

## 2022-09-13 ENCOUNTER — ROUTINE PRENATAL (OUTPATIENT)
Dept: PERINATAL CARE | Age: 32
End: 2022-09-13
Payer: OTHER GOVERNMENT

## 2022-09-13 VITALS
HEIGHT: 66 IN | WEIGHT: 145 LBS | RESPIRATION RATE: 16 BRPM | TEMPERATURE: 97.3 F | BODY MASS INDEX: 23.3 KG/M2 | SYSTOLIC BLOOD PRESSURE: 124 MMHG | DIASTOLIC BLOOD PRESSURE: 67 MMHG | HEART RATE: 79 BPM

## 2022-09-13 DIAGNOSIS — O30.042 DICHORIONIC DIAMNIOTIC TWIN PREGNANCY IN SECOND TRIMESTER: Primary | ICD-10-CM

## 2022-09-13 DIAGNOSIS — Z3A.16 16 WEEKS GESTATION OF PREGNANCY: ICD-10-CM

## 2022-09-13 DIAGNOSIS — O34.592 ABNORMALITY OF UTERUS DURING PREGNANCY IN SECOND TRIMESTER: ICD-10-CM

## 2022-09-13 DIAGNOSIS — O43.122 VELAMENTOUS INSERTION OF UMBILICAL CORD IN SECOND TRIMESTER: ICD-10-CM

## 2022-09-13 DIAGNOSIS — Z13.89 ENCOUNTER FOR ROUTINE SCREENING FOR MALFORMATION USING ULTRASONICS: ICD-10-CM

## 2022-09-13 DIAGNOSIS — O09.812 PREGNANCY RESULTING FROM IN VITRO FERTILIZATION IN SECOND TRIMESTER: ICD-10-CM

## 2022-09-13 PROCEDURE — 76810 OB US >/= 14 WKS ADDL FETUS: CPT | Performed by: OBSTETRICS & GYNECOLOGY

## 2022-09-13 PROCEDURE — 99999 PR OFFICE/OUTPT VISIT,PROCEDURE ONLY: CPT | Performed by: OBSTETRICS & GYNECOLOGY

## 2022-09-13 PROCEDURE — 76805 OB US >/= 14 WKS SNGL FETUS: CPT | Performed by: OBSTETRICS & GYNECOLOGY

## 2022-09-13 PROCEDURE — 76817 TRANSVAGINAL US OBSTETRIC: CPT | Performed by: OBSTETRICS & GYNECOLOGY

## 2022-09-13 NOTE — PROGRESS NOTES
Obstetric/Gynecology Maternal Fetal Medicine Resident Note    Patient seen and evaluated, patient noted to have Baby B with velamentous cord insertion, also noted with abnormal fluid between twins. Patient is clinically asymptomatic. She has scheduled anatomy scan with full physician consult scheduled for 2 weeks.      DO NEMO Miller Resident, PGY2  [facility]  9/13/2022, 9:20 AM

## 2022-09-27 ENCOUNTER — HOSPITAL ENCOUNTER (OUTPATIENT)
Age: 32
Discharge: HOME | End: 2022-09-27
Payer: OTHER GOVERNMENT

## 2022-09-27 ENCOUNTER — ROUTINE PRENATAL (OUTPATIENT)
Dept: PERINATAL CARE | Age: 32
End: 2022-09-27
Payer: OTHER GOVERNMENT

## 2022-09-27 VITALS
TEMPERATURE: 97.5 F | WEIGHT: 146 LBS | HEIGHT: 66 IN | HEART RATE: 78 BPM | RESPIRATION RATE: 16 BRPM | DIASTOLIC BLOOD PRESSURE: 66 MMHG | BODY MASS INDEX: 23.46 KG/M2 | SYSTOLIC BLOOD PRESSURE: 114 MMHG

## 2022-09-27 DIAGNOSIS — O34.592 ABNORMALITY OF UTERUS DURING PREGNANCY IN SECOND TRIMESTER: ICD-10-CM

## 2022-09-27 DIAGNOSIS — O09.812 PREGNANCY RESULTING FROM IN VITRO FERTILIZATION IN SECOND TRIMESTER: ICD-10-CM

## 2022-09-27 DIAGNOSIS — O43.122 VELAMENTOUS INSERTION OF UMBILICAL CORD IN SECOND TRIMESTER: ICD-10-CM

## 2022-09-27 DIAGNOSIS — O30.042 DICHORIONIC DIAMNIOTIC TWIN PREGNANCY IN SECOND TRIMESTER: Primary | ICD-10-CM

## 2022-09-27 DIAGNOSIS — Z3A.18 18 WEEKS GESTATION OF PREGNANCY: ICD-10-CM

## 2022-09-27 PROCEDURE — 76817 TRANSVAGINAL US OBSTETRIC: CPT | Performed by: OBSTETRICS & GYNECOLOGY

## 2022-09-27 PROCEDURE — 82105 ALPHA-FETOPROTEIN SERUM: CPT

## 2022-09-27 PROCEDURE — 36415 COLL VENOUS BLD VENIPUNCTURE: CPT

## 2022-09-27 PROCEDURE — 76815 OB US LIMITED FETUS(S): CPT | Performed by: OBSTETRICS & GYNECOLOGY

## 2022-09-27 PROCEDURE — 99213 OFFICE O/P EST LOW 20 MIN: CPT | Performed by: OBSTETRICS & GYNECOLOGY

## 2022-09-28 LAB
SEND OUT REPORT: NORMAL
TEST NAME: NORMAL

## 2022-09-29 LAB
AFP INTERPRETATION: NORMAL
AFP MOM: 3.05
AFP SPECIMEN: NORMAL
AFP: 133 NG/ML
DATE OF BIRTH: NORMAL
DATING METHOD: NORMAL
DETERMINED BY: NORMAL
DIABETIC: NO
DONOR EGG?: NORMAL
DUE DATE: NORMAL
ESTIMATED DUE DATE: NORMAL
FAMILY HISTORY NTD: NO
GESTATIONAL AGE: NORMAL
IN VITRO FERTILIZATION: NO
INSULIN REQ DIABETES: NO
LAST MENSTRUAL PERIOD: NORMAL
MATERNAL AGE AT EDD: 30.5 YR
MATERNAL WEIGHT: 146
MONOCHORIONIC TWINS: NORMAL
NUMBER OF FETUSES: NORMAL
PATIENT WEIGHT UNITS: NORMAL
PATIENT WEIGHT: NORMAL
RACE (MATERNAL): NORMAL
RACE: NORMAL
REPEAT SPECIMEN?: NO
SMOKING: NO
SMOKING: NO
VALPROIC/CARBAMAZEP: NORMAL
ZZ NTE CLEAN UP: HISTORY: NO

## 2022-10-11 ENCOUNTER — ROUTINE PRENATAL (OUTPATIENT)
Dept: PERINATAL CARE | Age: 32
End: 2022-10-11
Payer: OTHER GOVERNMENT

## 2022-10-11 VITALS
SYSTOLIC BLOOD PRESSURE: 121 MMHG | HEIGHT: 66 IN | HEART RATE: 94 BPM | DIASTOLIC BLOOD PRESSURE: 69 MMHG | RESPIRATION RATE: 16 BRPM | BODY MASS INDEX: 24.11 KG/M2 | WEIGHT: 150 LBS | TEMPERATURE: 97.9 F

## 2022-10-11 DIAGNOSIS — O30.042 DICHORIONIC DIAMNIOTIC TWIN PREGNANCY IN SECOND TRIMESTER: Primary | ICD-10-CM

## 2022-10-11 DIAGNOSIS — O09.812 PREGNANCY RESULTING FROM IN VITRO FERTILIZATION IN SECOND TRIMESTER: ICD-10-CM

## 2022-10-11 DIAGNOSIS — Z3A.20 20 WEEKS GESTATION OF PREGNANCY: ICD-10-CM

## 2022-10-11 DIAGNOSIS — O34.592 ABNORMALITY OF UTERUS DURING PREGNANCY IN SECOND TRIMESTER: ICD-10-CM

## 2022-10-11 DIAGNOSIS — O43.122 VELAMENTOUS INSERTION OF UMBILICAL CORD IN SECOND TRIMESTER: ICD-10-CM

## 2022-10-11 PROCEDURE — 76812 OB US DETAILED ADDL FETUS: CPT | Performed by: OBSTETRICS & GYNECOLOGY

## 2022-10-11 PROCEDURE — 99999 PR OFFICE/OUTPT VISIT,PROCEDURE ONLY: CPT | Performed by: OBSTETRICS & GYNECOLOGY

## 2022-10-11 PROCEDURE — 76817 TRANSVAGINAL US OBSTETRIC: CPT | Performed by: OBSTETRICS & GYNECOLOGY

## 2022-10-11 PROCEDURE — 76811 OB US DETAILED SNGL FETUS: CPT | Performed by: OBSTETRICS & GYNECOLOGY

## 2022-10-11 NOTE — PROGRESS NOTES
Please refer to attached ultrasound report for doctor's evaluation of the clinical information obtained by vital signs, ultrasound, and/or non-stress test along with management recommendation.
ATTG: : hematuria and serous drainage from penis concern for infection / inflammation / abscess / cyst  will check labs, check US, check gc / chlamydia, offered HIV and patient would like as well.

## 2022-10-19 ENCOUNTER — ROUTINE PRENATAL (OUTPATIENT)
Dept: OBGYN CLINIC | Age: 32
End: 2022-10-19

## 2022-10-19 VITALS
SYSTOLIC BLOOD PRESSURE: 114 MMHG | DIASTOLIC BLOOD PRESSURE: 64 MMHG | WEIGHT: 154.56 LBS | BODY MASS INDEX: 24.95 KG/M2

## 2022-10-19 DIAGNOSIS — O09.819 TWIN PREGNANCY RESULTING FROM ASSISTED REPRODUCTIVE TECHNOLOGY (ART): ICD-10-CM

## 2022-10-19 DIAGNOSIS — O30.042 DICHORIONIC DIAMNIOTIC TWIN PREGNANCY IN SECOND TRIMESTER: ICD-10-CM

## 2022-10-19 DIAGNOSIS — O09.90 HIGH RISK PREGNANCY, ANTEPARTUM: Primary | ICD-10-CM

## 2022-10-19 DIAGNOSIS — Z3A.21 21 WEEKS GESTATION OF PREGNANCY: ICD-10-CM

## 2022-10-19 DIAGNOSIS — O30.009 TWIN PREGNANCY RESULTING FROM ASSISTED REPRODUCTIVE TECHNOLOGY (ART): ICD-10-CM

## 2022-10-19 PROCEDURE — 0502F SUBSEQUENT PRENATAL CARE: CPT | Performed by: OBSTETRICS & GYNECOLOGY

## 2022-10-20 NOTE — PROGRESS NOTES
Heath Bloom is a 29 y.o. female 18w7d        OB History    Para Term  AB Living   1 0 0 0 0     SAB IAB Ectopic Molar Multiple Live Births   0 0 0            # Outcome Date GA Lbr Casey/2nd Weight Sex Delivery Anes PTL Lv   1 Current                Vitals  BP: 114/64  Weight: 154 lb 9 oz (70.1 kg)  Patient Position: Sitting    The patient was seen and evaluated. There was positive fetal movements. No contractions or leakage of fluid. Signs and symptoms of  labor as well as labor were reviewed. The patients anatomy ultrasound has been completed and reviewed with patient. TOP ST OH Reviewed. A 28 week lab panel was ordered. This includes a (HH, 1 hr GTT, U/A C&S). The patient is to complete this in the next two to four weeks. The S/S of Pre-Eclampsia were reviewed with the patient in detail. She is to report any of these if they occur. She currently denies any of these. The patient is RH positive Rhogam Ordered no    The patient was instructed on fetal kick counts and was given a kick sheet to complete every 8 hours. This is to begin at 28 weeks gestation. She was instructed that the baby should move at a minimum of ten times within one hour after a meal. The patient was instructed to lay down on her left side twenty minutes after eating and count movements for up to one hour with a target value of ten movements. She was instructed to notify the office if she did not make that target after two attempts or if after any attempt there was less than four movements.       [place]urance  [place]     DOES NOT WANT TO KNOW GENDER OF BABIES     G1P    Plans to deliver: [facility]    FOB dorian    1st trimester screen/NIPs:     AFP    Fetal Echo    High Risk:  Di/Di Twins  IVF    Early 1 hr GTT:    Covid Vaccine: Yes Moderna and booster     Flu Vaccine: given 22    Tdap:    Rhogam:    1hr GTT:    3hr GTT:    GBS:    2nd trimester appointment with Dr. Laura Boogie :     3rd trimester appointment with Dr. Tino Israel :       Assessment:  1. Clary Parker is a 29 y.o. female  2.   3. 21w5d    Patient Active Problem List    Diagnosis Date Noted    Needs flu shot 2022     Priority: Medium    High risk pregnancy, antepartum 2022     Priority: Medium    IVF pregnancy 2022     Priority: Tory  in [address] diamniotic twin pregnancy  2022     Priority: Medium    Female infertility 2021     Priority: Medium    Recurrent candidiasis of vagina 2019    Gastroesophageal reflux disease 2019    Acute recurrent maxillary sinusitis 2016    Seasonal allergic rhinitis 2016    Anxiety and depression 2012     No meds currently      Environmental allergies         Diagnosis Orders   1. High risk pregnancy, antepartum  Glucose tolerance, 1 hour    CBC with Auto Differential    Urinalysis with Reflex to Culture      2. Twin pregnancy resulting from assisted reproductive technology (ART)  Glucose tolerance, 1 hour    CBC with Auto Differential    Urinalysis with Reflex to Culture      3. Dichorionic diamniotic twin pregnancy in second trimester  Glucose tolerance, 1 hour    CBC with Auto Differential    Urinalysis with Reflex to Culture      4. 21 weeks gestation of pregnancy  Glucose tolerance, 1 hour    CBC with Auto Differential    Urinalysis with Reflex to Culture            Plan:  The patient will return to the office for her next visit in 4 weeks. See antepartum flow sheet. Counseled on prenatal care and MFM as scheduled. Discussed delivery methods and will continue to discuss throughout pregnancy                          Patient was seen with total face to face time of 20 minutes. More than 50% of this visit was on counseling and education regarding her    Diagnosis Orders   1.  High risk pregnancy, antepartum  Glucose tolerance, 1 hour    CBC with Auto Differential    Urinalysis with Reflex to Culture      2. Twin pregnancy resulting from assisted reproductive technology (ART)  Glucose tolerance, 1 hour    CBC with Auto Differential    Urinalysis with Reflex to Culture      3. Dichorionic diamniotic twin pregnancy in second trimester  Glucose tolerance, 1 hour    CBC with Auto Differential    Urinalysis with Reflex to Culture      4. 21 weeks gestation of pregnancy  Glucose tolerance, 1 hour    CBC with Auto Differential    Urinalysis with Reflex to Culture       and her options. She was also counseled on her preventative health maintenance recommendations and follow-up.

## 2022-10-25 ENCOUNTER — ROUTINE PRENATAL (OUTPATIENT)
Dept: PERINATAL CARE | Age: 32
End: 2022-10-25
Payer: OTHER GOVERNMENT

## 2022-10-25 VITALS
SYSTOLIC BLOOD PRESSURE: 112 MMHG | TEMPERATURE: 97.3 F | HEIGHT: 66 IN | RESPIRATION RATE: 16 BRPM | BODY MASS INDEX: 24.27 KG/M2 | HEART RATE: 84 BPM | WEIGHT: 151 LBS | DIASTOLIC BLOOD PRESSURE: 62 MMHG

## 2022-10-25 DIAGNOSIS — O43.122 VELAMENTOUS INSERTION OF UMBILICAL CORD IN SECOND TRIMESTER: ICD-10-CM

## 2022-10-25 DIAGNOSIS — O09.812 PREGNANCY RESULTING FROM IN VITRO FERTILIZATION IN SECOND TRIMESTER: ICD-10-CM

## 2022-10-25 DIAGNOSIS — O30.042 DICHORIONIC DIAMNIOTIC TWIN PREGNANCY IN SECOND TRIMESTER: Primary | ICD-10-CM

## 2022-10-25 DIAGNOSIS — Z3A.22 22 WEEKS GESTATION OF PREGNANCY: ICD-10-CM

## 2022-10-25 PROCEDURE — 76815 OB US LIMITED FETUS(S): CPT | Performed by: OBSTETRICS & GYNECOLOGY

## 2022-10-25 PROCEDURE — 76817 TRANSVAGINAL US OBSTETRIC: CPT | Performed by: OBSTETRICS & GYNECOLOGY

## 2022-10-25 PROCEDURE — 99999 PR OFFICE/OUTPT VISIT,PROCEDURE ONLY: CPT | Performed by: OBSTETRICS & GYNECOLOGY

## 2022-11-08 ENCOUNTER — ROUTINE PRENATAL (OUTPATIENT)
Dept: PERINATAL CARE | Age: 32
End: 2022-11-08
Payer: OTHER GOVERNMENT

## 2022-11-08 VITALS
TEMPERATURE: 97.5 F | BODY MASS INDEX: 24.43 KG/M2 | RESPIRATION RATE: 16 BRPM | WEIGHT: 152 LBS | HEIGHT: 66 IN | DIASTOLIC BLOOD PRESSURE: 78 MMHG | HEART RATE: 76 BPM | SYSTOLIC BLOOD PRESSURE: 120 MMHG

## 2022-11-08 DIAGNOSIS — Z36.4 ULTRASOUND FOR ANTENATAL SCREENING FOR FETAL GROWTH RESTRICTION: ICD-10-CM

## 2022-11-08 DIAGNOSIS — O09.812 PREGNANCY RESULTING FROM IN VITRO FERTILIZATION IN SECOND TRIMESTER: ICD-10-CM

## 2022-11-08 DIAGNOSIS — O43.122 VELAMENTOUS INSERTION OF UMBILICAL CORD IN SECOND TRIMESTER: ICD-10-CM

## 2022-11-08 DIAGNOSIS — O30.042 DICHORIONIC DIAMNIOTIC TWIN PREGNANCY IN SECOND TRIMESTER: Primary | ICD-10-CM

## 2022-11-08 DIAGNOSIS — Z3A.24 24 WEEKS GESTATION OF PREGNANCY: ICD-10-CM

## 2022-11-08 PROCEDURE — 76817 TRANSVAGINAL US OBSTETRIC: CPT | Performed by: OBSTETRICS & GYNECOLOGY

## 2022-11-08 PROCEDURE — 93325 DOPPLER ECHO COLOR FLOW MAPG: CPT | Performed by: OBSTETRICS & GYNECOLOGY

## 2022-11-08 PROCEDURE — 76820 UMBILICAL ARTERY ECHO: CPT | Performed by: OBSTETRICS & GYNECOLOGY

## 2022-11-08 PROCEDURE — 99999 PR OFFICE/OUTPT VISIT,PROCEDURE ONLY: CPT | Performed by: OBSTETRICS & GYNECOLOGY

## 2022-11-08 PROCEDURE — 76825 ECHO EXAM OF FETAL HEART: CPT | Performed by: OBSTETRICS & GYNECOLOGY

## 2022-11-08 PROCEDURE — 76816 OB US FOLLOW-UP PER FETUS: CPT | Performed by: OBSTETRICS & GYNECOLOGY

## 2022-11-08 PROCEDURE — 76827 ECHO EXAM OF FETAL HEART: CPT | Performed by: OBSTETRICS & GYNECOLOGY

## 2022-11-17 ENCOUNTER — ROUTINE PRENATAL (OUTPATIENT)
Dept: OBGYN CLINIC | Age: 32
End: 2022-11-17

## 2022-11-17 VITALS — SYSTOLIC BLOOD PRESSURE: 110 MMHG | BODY MASS INDEX: 24.78 KG/M2 | DIASTOLIC BLOOD PRESSURE: 64 MMHG | WEIGHT: 153.5 LBS

## 2022-11-17 DIAGNOSIS — Z3A.25 25 WEEKS GESTATION OF PREGNANCY: ICD-10-CM

## 2022-11-17 DIAGNOSIS — O30.042 DICHORIONIC DIAMNIOTIC TWIN PREGNANCY IN SECOND TRIMESTER: ICD-10-CM

## 2022-11-17 DIAGNOSIS — O09.819 TWIN PREGNANCY RESULTING FROM ASSISTED REPRODUCTIVE TECHNOLOGY (ART): ICD-10-CM

## 2022-11-17 DIAGNOSIS — O30.009 TWIN PREGNANCY RESULTING FROM ASSISTED REPRODUCTIVE TECHNOLOGY (ART): ICD-10-CM

## 2022-11-17 DIAGNOSIS — O09.90 HIGH RISK PREGNANCY, ANTEPARTUM: Primary | ICD-10-CM

## 2022-11-17 PROCEDURE — 0502F SUBSEQUENT PRENATAL CARE: CPT | Performed by: ADVANCED PRACTICE MIDWIFE

## 2022-11-17 NOTE — PROGRESS NOTES
SUBJECTIVE:    Gurdeep Saab is here for her return OB visit. denies concerns today. She reports  feeling fetal movement. She denies vaginal bleeding. She denies vaginal discharge. She denies leaking of fluid. She denies uterine cramping. She denies  nausea and/or vomiting. OBJECTIVE:  Blood pressure 110/64, weight 153 lb 8 oz (69.6 kg), last menstrual period 2022, not currently breastfeeding. Total weight gain: 13 lb 8 oz (6.124 kg)    Gurdeep Saab declined the flu vaccine as appropriate. Gurdeep Saab accepted the COVID vaccine as appropriate    ASSESSMENT/PLAN:  IUP @ 25+6 weeks  S =D    High Risk Pregnancy  Due date is based on IVF transfer  Patient's prenatal labs are completed. Patient's blood type A positive and rhogam is not indicated in pregnancy. Early glucola indicated: no  Patient accepted genetic screening. First trimester screen is completed and normal. NIPS normal x2  Anatomy scan completed. Placenta posterior,normal cord insertion: Yes on baby A. Baby B posterior placenta velamentous cord insertion, cervical length 42mm, no low lying, no previa noted. Follow up regularly d/t twin pregnancy. 22 EFW baby A 45% EFW baby B 36% fluid within normal limits x2. Fetal echo 22 WNL x2  Glucola between 24-28 weeks. Ordered not yet completed  Total weight gain in pregnancy reviewed: Yes  Fetal movement reviewed  Reviewed signs and symptoms of  labor: yes  Reviewed signs and symptoms of preeclampsia: yes  Reviewed signs and symptoms of kofi hick contractions: yes      2. 25 weeks gestation of pregnancy  - Reviewed warning signs  -Began discussing birth with multiples. Patient strongly desires vaginal birth    1. Twin pregnancy resulting from assisted reproductive technology (ART)      4.  Dichorionic diamniotic twin pregnancy in second trimester  - Taking baby ASA daily  - [facility] with [facility]        She was counseled regarding all of the above:    Return in about 4 weeks (around 12/15/2022) for Return OB.     The patient, Steven Suarez,  was seen with a total time spent of 25 minutes for the visit on this date of service by the [facility]  On this date November 17, 2022,  I have spent greater than 50% of this visit:  [x] reviewing previous notes  [x] reviewing test results  [x] pre-charting  Discussed with patient:  [x] Importance of compliance with treatment plan  [x] Counseling  [] Coordinating care  [x] Documenting     Electronically Signed By: Gary Gan

## 2022-11-22 ENCOUNTER — ROUTINE PRENATAL (OUTPATIENT)
Dept: PERINATAL CARE | Age: 32
End: 2022-11-22
Payer: OTHER GOVERNMENT

## 2022-11-22 VITALS
RESPIRATION RATE: 16 BRPM | SYSTOLIC BLOOD PRESSURE: 112 MMHG | HEART RATE: 90 BPM | HEIGHT: 66 IN | TEMPERATURE: 96.8 F | DIASTOLIC BLOOD PRESSURE: 60 MMHG | BODY MASS INDEX: 25.07 KG/M2 | WEIGHT: 156 LBS

## 2022-11-22 DIAGNOSIS — O30.042 DICHORIONIC DIAMNIOTIC TWIN PREGNANCY IN SECOND TRIMESTER: Primary | ICD-10-CM

## 2022-11-22 DIAGNOSIS — Z3A.26 26 WEEKS GESTATION OF PREGNANCY: ICD-10-CM

## 2022-11-22 DIAGNOSIS — O09.812 PREGNANCY RESULTING FROM IN VITRO FERTILIZATION IN SECOND TRIMESTER: ICD-10-CM

## 2022-11-22 DIAGNOSIS — O43.122 VELAMENTOUS INSERTION OF UMBILICAL CORD IN SECOND TRIMESTER: ICD-10-CM

## 2022-11-22 PROCEDURE — 76820 UMBILICAL ARTERY ECHO: CPT | Performed by: OBSTETRICS & GYNECOLOGY

## 2022-11-22 PROCEDURE — 76815 OB US LIMITED FETUS(S): CPT | Performed by: OBSTETRICS & GYNECOLOGY

## 2022-11-22 PROCEDURE — 76817 TRANSVAGINAL US OBSTETRIC: CPT | Performed by: OBSTETRICS & GYNECOLOGY

## 2022-11-22 PROCEDURE — 99999 PR OFFICE/OUTPT VISIT,PROCEDURE ONLY: CPT | Performed by: OBSTETRICS & GYNECOLOGY

## 2022-11-23 ENCOUNTER — HOSPITAL ENCOUNTER (OUTPATIENT)
Age: 32
Setting detail: SPECIMEN
Discharge: HOME | End: 2022-11-23

## 2022-11-23 DIAGNOSIS — O09.819 TWIN PREGNANCY RESULTING FROM ASSISTED REPRODUCTIVE TECHNOLOGY (ART): ICD-10-CM

## 2022-11-23 DIAGNOSIS — O30.042 DICHORIONIC DIAMNIOTIC TWIN PREGNANCY IN SECOND TRIMESTER: ICD-10-CM

## 2022-11-23 DIAGNOSIS — O09.90 HIGH RISK PREGNANCY, ANTEPARTUM: ICD-10-CM

## 2022-11-23 DIAGNOSIS — O30.009 TWIN PREGNANCY RESULTING FROM ASSISTED REPRODUCTIVE TECHNOLOGY (ART): ICD-10-CM

## 2022-11-23 DIAGNOSIS — Z3A.21 21 WEEKS GESTATION OF PREGNANCY: ICD-10-CM

## 2022-11-23 LAB
ABSOLUTE EOS #: 0.22 K/UL (ref 0–0.44)
ABSOLUTE IMMATURE GRANULOCYTE: 0.07 K/UL (ref 0–0.3)
ABSOLUTE LYMPH #: 1.6 K/UL (ref 1.1–3.7)
ABSOLUTE MONO #: 1.01 K/UL (ref 0.1–1.2)
BACTERIA: ABNORMAL
BASOPHILS # BLD: 0 % (ref 0–2)
BASOPHILS ABSOLUTE: 0.04 K/UL (ref 0–0.2)
BILIRUBIN URINE: NEGATIVE
COLOR: YELLOW
EOSINOPHILS RELATIVE PERCENT: 2 % (ref 1–4)
EPITHELIAL CELLS UA: ABNORMAL /HPF (ref 0–5)
GLUCOSE ADMINISTRATION: NORMAL
GLUCOSE TOLERANCE SCREEN 50G: 102 MG/DL (ref 70–135)
GLUCOSE URINE: NEGATIVE
HCT VFR BLD CALC: 34 % (ref 36.3–47.1)
HEMOGLOBIN: 11.2 G/DL (ref 11.9–15.1)
IMMATURE GRANULOCYTES: 1 %
KETONES, URINE: NEGATIVE
LEUKOCYTE ESTERASE, URINE: ABNORMAL
LYMPHOCYTES # BLD: 15 % (ref 24–43)
MCH RBC QN AUTO: 31.2 PG (ref 25.2–33.5)
MCHC RBC AUTO-ENTMCNC: 32.9 G/DL (ref 28.4–34.8)
MCV RBC AUTO: 94.7 FL (ref 82.6–102.9)
MONOCYTES # BLD: 9 % (ref 3–12)
NITRITE, URINE: NEGATIVE
NRBC AUTOMATED: 0 PER 100 WBC
PDW BLD-RTO: 12.4 % (ref 11.8–14.4)
PH UA: 7.5 (ref 5–8)
PLATELET # BLD: 216 K/UL (ref 138–453)
PMV BLD AUTO: 11.5 FL (ref 8.1–13.5)
PROTEIN UA: NEGATIVE
RBC # BLD: 3.59 M/UL (ref 3.95–5.11)
RBC UA: ABNORMAL /HPF (ref 0–2)
SEG NEUTROPHILS: 73 % (ref 36–65)
SEGMENTED NEUTROPHILS ABSOLUTE COUNT: 7.78 K/UL (ref 1.5–8.1)
SPECIFIC GRAVITY UA: 1.01 (ref 1–1.03)
TURBIDITY: ABNORMAL
URINE HGB: NEGATIVE
UROBILINOGEN, URINE: NORMAL
WBC # BLD: 10.7 K/UL (ref 3.5–11.3)
WBC UA: ABNORMAL /HPF (ref 0–5)

## 2022-12-06 ENCOUNTER — ROUTINE PRENATAL (OUTPATIENT)
Dept: PERINATAL CARE | Age: 32
End: 2022-12-06
Payer: OTHER GOVERNMENT

## 2022-12-06 VITALS
DIASTOLIC BLOOD PRESSURE: 69 MMHG | HEART RATE: 91 BPM | TEMPERATURE: 97.5 F | RESPIRATION RATE: 16 BRPM | SYSTOLIC BLOOD PRESSURE: 112 MMHG | BODY MASS INDEX: 25.47 KG/M2 | HEIGHT: 66 IN | WEIGHT: 158.51 LBS

## 2022-12-06 DIAGNOSIS — Z36.4 ULTRASOUND FOR ANTENATAL SCREENING FOR FETAL GROWTH RESTRICTION: ICD-10-CM

## 2022-12-06 DIAGNOSIS — O30.043 DICHORIONIC DIAMNIOTIC TWIN PREGNANCY IN THIRD TRIMESTER: Primary | ICD-10-CM

## 2022-12-06 DIAGNOSIS — Z3A.28 28 WEEKS GESTATION OF PREGNANCY: ICD-10-CM

## 2022-12-06 DIAGNOSIS — O43.123 VELAMENTOUS INSERTION OF UMBILICAL CORD IN THIRD TRIMESTER: ICD-10-CM

## 2022-12-06 DIAGNOSIS — O35.HXX0 SHORT FEMUR OF FETUS ON PRENATAL ULTRASOUND: ICD-10-CM

## 2022-12-06 DIAGNOSIS — Z13.89 ENCOUNTER FOR ROUTINE SCREENING FOR MALFORMATION USING ULTRASONICS: ICD-10-CM

## 2022-12-06 DIAGNOSIS — O35.07X0 MICROCEPHALY, FETAL, AFFECTING CARE OF MOTHER, ANTEPARTUM, NOT APPLICABLE OR UNSPECIFIED FETUS: Primary | ICD-10-CM

## 2022-12-06 DIAGNOSIS — O35.HXX0: ICD-10-CM

## 2022-12-06 DIAGNOSIS — O36.5931 INTRAUTERINE GROWTH RESTRICTION AFFECTING CARE OF MOTHER, ANTEPARTUM, THIRD TRIMESTER, FETUS 1: ICD-10-CM

## 2022-12-06 DIAGNOSIS — O09.813 PREGNANCY RESULTING FROM IN VITRO FERTILIZATION IN THIRD TRIMESTER: ICD-10-CM

## 2022-12-06 DIAGNOSIS — O36.5931 POOR FETAL GROWTH AFFECTING MANAGEMENT OF MOTHER IN THIRD TRIMESTER, FETUS 1 OF MULTIPLE GESTATION: ICD-10-CM

## 2022-12-06 LAB
ABDOMINAL CIRCUMFERENCE: NORMAL
ABDOMINAL CIRCUMFERENCE: NORMAL
BIPARIETAL DIAMETER: NORMAL
BIPARIETAL DIAMETER: NORMAL
ESTIMATED FETAL WEIGHT: NORMAL
ESTIMATED FETAL WEIGHT: NORMAL
FEMORAL DIAMETER: NORMAL
FEMORAL DIAMETER: NORMAL
HC/AC: NORMAL
HC/AC: NORMAL
HEAD CIRCUMFERENCE: NORMAL
HEAD CIRCUMFERENCE: NORMAL

## 2022-12-06 PROCEDURE — 76821 MIDDLE CEREBRAL ARTERY ECHO: CPT | Performed by: OBSTETRICS & GYNECOLOGY

## 2022-12-06 PROCEDURE — 76820 UMBILICAL ARTERY ECHO: CPT | Performed by: OBSTETRICS & GYNECOLOGY

## 2022-12-06 PROCEDURE — 76810 OB US >/= 14 WKS ADDL FETUS: CPT | Performed by: OBSTETRICS & GYNECOLOGY

## 2022-12-06 PROCEDURE — 76817 TRANSVAGINAL US OBSTETRIC: CPT | Performed by: OBSTETRICS & GYNECOLOGY

## 2022-12-06 PROCEDURE — 76805 OB US >/= 14 WKS SNGL FETUS: CPT | Performed by: OBSTETRICS & GYNECOLOGY

## 2022-12-06 PROCEDURE — 76819 FETAL BIOPHYS PROFIL W/O NST: CPT | Performed by: OBSTETRICS & GYNECOLOGY

## 2022-12-06 PROCEDURE — 99999 PR OFFICE/OUTPT VISIT,PROCEDURE ONLY: CPT | Performed by: OBSTETRICS & GYNECOLOGY

## 2022-12-12 ENCOUNTER — TELEPHONE (OUTPATIENT)
Dept: OBGYN CLINIC | Age: 32
End: 2022-12-12

## 2022-12-12 NOTE — TELEPHONE ENCOUNTER
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     Has another performance today at school after work    Call back

## 2022-12-12 NOTE — TELEPHONE ENCOUNTER
Called her back and she is starting to feel better. Her BP is back down to 128/84. She was requesting to be seen tomorrow or Wed. I did get her in a slot at 245 for BP check and urine dip. She is aware to be seen if feeling worse or BP gets any higher.

## 2022-12-12 NOTE — TELEPHONE ENCOUNTER
She definitely needs to be seen have her blood pressure checked and dip urine here. You can add her on to my schedule today if able. Recommend try tylenol also and increase water. If she has any BP readings over 160/110 or severe headaches with vision changes, chest pain or shortness of breath needs to notify us and go directly into hospital for evaluation.

## 2022-12-13 ENCOUNTER — HOSPITAL ENCOUNTER (OUTPATIENT)
Age: 32
Setting detail: SPECIMEN
Discharge: HOME | End: 2022-12-13

## 2022-12-13 ENCOUNTER — ROUTINE PRENATAL (OUTPATIENT)
Dept: OBGYN CLINIC | Age: 32
End: 2022-12-13

## 2022-12-13 VITALS — DIASTOLIC BLOOD PRESSURE: 62 MMHG | WEIGHT: 158 LBS | BODY MASS INDEX: 25.5 KG/M2 | SYSTOLIC BLOOD PRESSURE: 110 MMHG

## 2022-12-13 DIAGNOSIS — O30.009 TWIN PREGNANCY RESULTING FROM ASSISTED REPRODUCTIVE TECHNOLOGY (ART): Primary | ICD-10-CM

## 2022-12-13 DIAGNOSIS — O26.893 PREGNANCY HEADACHE IN THIRD TRIMESTER: ICD-10-CM

## 2022-12-13 DIAGNOSIS — R51.9 PREGNANCY HEADACHE IN THIRD TRIMESTER: ICD-10-CM

## 2022-12-13 DIAGNOSIS — O30.009 TWIN PREGNANCY RESULTING FROM ASSISTED REPRODUCTIVE TECHNOLOGY (ART): ICD-10-CM

## 2022-12-13 DIAGNOSIS — O09.819 TWIN PREGNANCY RESULTING FROM ASSISTED REPRODUCTIVE TECHNOLOGY (ART): Primary | ICD-10-CM

## 2022-12-13 DIAGNOSIS — Z3A.29 29 WEEKS GESTATION OF PREGNANCY: ICD-10-CM

## 2022-12-13 DIAGNOSIS — O09.819 TWIN PREGNANCY RESULTING FROM ASSISTED REPRODUCTIVE TECHNOLOGY (ART): ICD-10-CM

## 2022-12-13 LAB
ALBUMIN SERPL-MCNC: 3.6 G/DL (ref 3.5–5.2)
ALBUMIN/GLOBULIN RATIO: 1.3 (ref 1–2.5)
ALP BLD-CCNC: 79 U/L (ref 35–104)
ALT SERPL-CCNC: 15 U/L (ref 5–33)
ANION GAP SERPL CALCULATED.3IONS-SCNC: 11 MMOL/L (ref 9–17)
AST SERPL-CCNC: 20 U/L
BILIRUB SERPL-MCNC: 0.2 MG/DL (ref 0.3–1.2)
BUN BLDV-MCNC: 14 MG/DL (ref 6–20)
CALCIUM SERPL-MCNC: 9.4 MG/DL (ref 8.6–10.4)
CHLORIDE BLD-SCNC: 102 MMOL/L (ref 98–107)
CO2: 22 MMOL/L (ref 20–31)
CREAT SERPL-MCNC: 0.55 MG/DL (ref 0.5–0.9)
GFR SERPL CREATININE-BSD FRML MDRD: >60 ML/MIN/1.73M2
GLUCOSE BLD-MCNC: 68 MG/DL (ref 70–99)
HCT VFR BLD CALC: 32.5 % (ref 36.3–47.1)
HEMOGLOBIN: 11.1 G/DL (ref 11.9–15.1)
MCH RBC QN AUTO: 30.7 PG (ref 25.2–33.5)
MCHC RBC AUTO-ENTMCNC: 34.2 G/DL (ref 28.4–34.8)
MCV RBC AUTO: 90 FL (ref 82.6–102.9)
NRBC AUTOMATED: 0 PER 100 WBC
PDW BLD-RTO: 12.1 % (ref 11.8–14.4)
PLATELET # BLD: 205 K/UL (ref 138–453)
PMV BLD AUTO: 12 FL (ref 8.1–13.5)
POTASSIUM SERPL-SCNC: 4.4 MMOL/L (ref 3.7–5.3)
RBC # BLD: 3.61 M/UL (ref 3.95–5.11)
SODIUM BLD-SCNC: 135 MMOL/L (ref 135–144)
TOTAL PROTEIN: 6.4 G/DL (ref 6.4–8.3)
WBC # BLD: 12.2 K/UL (ref 3.5–11.3)

## 2022-12-13 PROCEDURE — 0502F SUBSEQUENT PRENATAL CARE: CPT | Performed by: NURSE PRACTITIONER

## 2022-12-13 RX ORDER — BREAST PUMP
EACH MISCELLANEOUS
Qty: 1 EACH | Refills: 0 | Status: SHIPPED | OUTPATIENT
Start: 2022-12-13

## 2022-12-13 NOTE — PROGRESS NOTES
Presents for OB visit  Gestation 29w4d  Estimated Date of Delivery: 23    Insurance Ayde Peter     DOES NOT WANT TO KNOW GENDER OF BABIES     G1P    Plans to deliver: [facility]    FOB dorian    1st trimester screen/NIPs:     AFP    Fetal Echo    High Risk:  Di/Di Twins  IVF    Early 1 hr GTT:    Covid Vaccine: Yes Moderna and booster     Flu Vaccine: given 22    Tdap:    Rhogam:    1hr GTT:    3hr GTT:    GBS:    2nd trimester appointment with Dr. Bo Garcia :     3rd trimester appointment with Dr. Bo Garcia :           OB History    Para Term  AB Living   1 0 0 0 0     SAB IAB Ectopic Molar Multiple Live Births   0 0 0            # Outcome Date GA Lbr Casey/2nd Weight Sex Delivery Anes PTL Lv   1 Current                     Allergies   Allergen Reactions    Shellfish-Derived Products      Current Outpatient Medications   Medication Sig Dispense Refill    aspirin 81 MG chewable tablet Take 81 mg by mouth in the morning. UNABLE TO FIND ALIVE - PREMIUM PRENATAL GUMMIES      cetirizine (ZYRTEC) 10 MG tablet Take 10 mg by mouth daily (Patient not taking: No sig reported)       No current facility-administered medications for this visit.            Past Medical History:   Diagnosis Date    Anxiety     C. 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 She states 22 no headache then mild headache on 22 and had school nurse take BP and was elevated,  140/84. She states increased fluids again headache resolved no complaints today. /62 and neg protein in urine dip  Swelling: has had intermittent swelling to bilateral lower extremities for months it has not worsened wears compression stockings improves with elevation and in AM.   Bleeding: no  Discharge: no   Dysuria: no  Nausea: no  Heartburn: no  Epigastric pain: no  Contractions: no  Leakage of fluid: no  + fetal movement       Return 1 WEEK  Headaches in pregnancy- BP normal as noted no headache today check labs as ordered . Discussed with monitor BP at home notify us if above 140/90 and if above 160/110 or   severe or persistent  headaches, vision changes, cp, sob, midepigastric pain or swelling needs to go directly to hospital and OB. Discussed risks preeclampsia  Women with preeclampsia are at increased risk for life-threatening events, including placental abruption, acute kidney injury, cerebral hemorrhage, hepatic failure or rupture, pulmonary edema, stroke, cardiac failure, and progression to eclampsia  The fetus in preeclamptic pregnancies is at increased risk for growth restriction and medically or obstetrically indicated  birth. Fetal mortality   Labs as indicated CBC, prot/creat ratio, CMP, UA for recent headaches    PTL signs reviewed, if indicated  Kick Count Instructions given if indicated: The patient was instructed on fetal kick counts and was given a kick sheet to complete every 8 hours. This is to begin at 28 weeks gestation.  She was instructed that the baby should move at a minimum of ten times within one hour after a meal. The patient was instructed to lay down on her left side twenty minutes after eating and count movements for up to one hour with a target value of ten movements. She was instructed to notify the office if she did not make that target after two attempts or if after any attempt there was less than four movements. After hour numbers reviewed , along with labor signs if indicated. Contractions/cramping between 5-7 minutes and persisting even with attempts of increased water and laying on side. Fluid leakage, bleeding    The patient was counseled on Labor & Delivery. Route of delivery and counseling on vaginal, operative vaginal, and  sections were completed with the risks of each to both the patient as well as her baby. The possibility of a blood transfusion was discussed as well. The patient was not opposed to receiving a transfusion if needed. The patient was counseled on types of analgesia during labor and is considering either Regional or IV medication the risks, benefits and alternatives were discussed. The patient was counseled on the mandatory call ahead policy. She has been instructed to call the office at anytime prior to going into the hospital so the on-call physician may direct her to the appropriate facility for care. Exceptions were reviewed including but not limited to: Decreased fetal movement, vaginal Bleeding or hemorrhage, trauma, readily expectant delivery, or any instance where she feels 911 should be utilized. Of the 30 minute duration appointment visit, Deysi Silva CNP spent at least 50% of the face-to-face time in counseling, explanation of diagnosis, planning of further management, and answering all questions.

## 2022-12-21 ENCOUNTER — HOSPITAL ENCOUNTER (OUTPATIENT)
Age: 32
Setting detail: SPECIMEN
Discharge: HOME | End: 2022-12-21

## 2022-12-21 ENCOUNTER — ROUTINE PRENATAL (OUTPATIENT)
Dept: OBGYN CLINIC | Age: 32
End: 2022-12-21

## 2022-12-21 VITALS — WEIGHT: 160 LBS | DIASTOLIC BLOOD PRESSURE: 64 MMHG | BODY MASS INDEX: 25.82 KG/M2 | SYSTOLIC BLOOD PRESSURE: 112 MMHG

## 2022-12-21 DIAGNOSIS — O30.042 DICHORIONIC DIAMNIOTIC TWIN PREGNANCY IN SECOND TRIMESTER: ICD-10-CM

## 2022-12-21 DIAGNOSIS — O35.07X0 MICROCEPHALY, FETAL, AFFECTING CARE OF MOTHER, ANTEPARTUM, NOT APPLICABLE OR UNSPECIFIED FETUS: ICD-10-CM

## 2022-12-21 DIAGNOSIS — O09.819 TWIN PREGNANCY RESULTING FROM ASSISTED REPRODUCTIVE TECHNOLOGY (ART): ICD-10-CM

## 2022-12-21 DIAGNOSIS — O26.893 PREGNANCY HEADACHE IN THIRD TRIMESTER: ICD-10-CM

## 2022-12-21 DIAGNOSIS — Z3A.29 29 WEEKS GESTATION OF PREGNANCY: ICD-10-CM

## 2022-12-21 DIAGNOSIS — Z3A.30 30 WEEKS GESTATION OF PREGNANCY: ICD-10-CM

## 2022-12-21 DIAGNOSIS — O36.5990 PREGNANCY AFFECTED BY FETAL GROWTH RESTRICTION: ICD-10-CM

## 2022-12-21 DIAGNOSIS — R51.9 PREGNANCY HEADACHE IN THIRD TRIMESTER: ICD-10-CM

## 2022-12-21 DIAGNOSIS — O30.009 TWIN PREGNANCY RESULTING FROM ASSISTED REPRODUCTIVE TECHNOLOGY (ART): ICD-10-CM

## 2022-12-21 DIAGNOSIS — O09.90 HIGH RISK PREGNANCY, ANTEPARTUM: Primary | ICD-10-CM

## 2022-12-21 LAB
BACTERIA: ABNORMAL
BILIRUBIN URINE: NEGATIVE
CASTS UA: ABNORMAL /LPF (ref 0–2)
COLOR: YELLOW
CREATININE URINE: 44.6 MG/DL (ref 28–217)
EPITHELIAL CELLS UA: ABNORMAL /HPF (ref 0–5)
GLUCOSE URINE: NEGATIVE
KETONES, URINE: NEGATIVE
LEUKOCYTE ESTERASE, URINE: NEGATIVE
NITRITE, URINE: NEGATIVE
PH UA: 7 (ref 5–8)
PROTEIN UA: NEGATIVE
RBC UA: ABNORMAL /HPF (ref 0–2)
SPECIFIC GRAVITY UA: 1.01 (ref 1–1.03)
TOTAL PROTEIN, URINE: 10 MG/DL
TURBIDITY: ABNORMAL
URINE HGB: NEGATIVE
URINE TOTAL PROTEIN CREATININE RATIO: 0.22 (ref 0–0.2)
UROBILINOGEN, URINE: NORMAL
WBC UA: ABNORMAL /HPF (ref 0–5)

## 2022-12-21 NOTE — PROGRESS NOTES
SUBJECTIVE:    Adriano Alcala is here for her return OB visit. denies concerns today. She reports  feeling fetal movement. She denies vaginal bleeding. She denies vaginal discharge. She denies leaking of fluid. She denies uterine cramping. She denies  nausea and/or vomiting. OBJECTIVE:  Blood pressure 112/64, weight 160 lb (72.6 kg), last menstrual period 2022, not currently breastfeeding. Total weight gain: 20 lb (9.072 kg)    ASSESSMENT/PLAN:  IUP @ 30+5 weeks  S >D    High Risk Pregnancy  Due date is based on IVF transfer  Patient's prenatal labs are completed. Patient's blood type A positive and rhogam is not indicated in pregnancy. Early glucola indicated: no  Patient accepted genetic screening. First trimester screen is completed and normal. NIPS normal x2  Anatomy scan completed. Placenta posterior,normal cord insertion: Yes on baby A. Baby B posterior placenta velamentous cord insertion, cervical length 42mm, no low lying, no previa noted. Follow up regularly d/t twin pregnancy. 22 EFW baby A 45% EFW baby B 36% fluid within normal limits x2. Fetal echo 22 WNL x2  22 latest growth baby A EFW 40% AC <3 percentile  Baby B 32% bpd <3%, HC 4%  Glucola between 24-28 weeks. complete 102 Pass  Total weight gain in pregnancy reviewed: Yes  Fetal Kick Count was discussed and explained. She was instructed to lay on her left side 20 minutes after eating and count movements for up to 2 hours with a target value of 10 movements. Instructed to notify office if she does not make the target. GBS discussed  Reviewed signs and symptoms of  labor: yes  Reviewed signs and symptoms of preeclampsia: yes  Reviewed signs and symptoms of kofi hick contractions: yes      2. 30 weeks gestation of pregnancy    3. Dichorionic diamniotic twin pregnancy in second trimester  - Following with MFM    4. Twin pregnancy resulting from assisted reproductive technology (ART)  - IVF    5.  Microcephaly, fetal, affecting care of mother, antepartum, not applicable or unspecified fetus  - Reviewed recent ultrasound findings on baby B    10. Pregnancy affected by fetal growth restriction  - Reviewed recent ultrasound findings on baby A and implications for delivery and fetal surveillance         She was counseled regarding all of the above:    Return in about 2 weeks (around 1/4/2023) for NST, Return OB.     The patient, Syd Allison,  was seen with a total time spent of 25 minutes for the visit on this date of service by the [facility]  On this date December 21, 2022,  I have spent greater than 50% of this visit:  [x] reviewing previous notes  [x] reviewing test results  [x] pre-charting  Discussed with patient:  [x] Importance of compliance with treatment plan  [x] Counseling  [] Coordinating care  [x] Documenting     Electronically Signed By: Gary Heller

## 2022-12-22 ENCOUNTER — ROUTINE PRENATAL (OUTPATIENT)
Dept: PERINATAL CARE | Age: 32
End: 2022-12-22
Payer: OTHER GOVERNMENT

## 2022-12-22 VITALS
BODY MASS INDEX: 26.03 KG/M2 | TEMPERATURE: 97.3 F | HEART RATE: 92 BPM | RESPIRATION RATE: 16 BRPM | SYSTOLIC BLOOD PRESSURE: 122 MMHG | WEIGHT: 162 LBS | DIASTOLIC BLOOD PRESSURE: 72 MMHG | HEIGHT: 66 IN

## 2022-12-22 DIAGNOSIS — Z3A.30 30 WEEKS GESTATION OF PREGNANCY: ICD-10-CM

## 2022-12-22 DIAGNOSIS — O30.043 DICHORIONIC DIAMNIOTIC TWIN PREGNANCY IN THIRD TRIMESTER: Primary | ICD-10-CM

## 2022-12-22 DIAGNOSIS — O35.HXX0 SHORT FEMUR OF FETUS ON PRENATAL ULTRASOUND: ICD-10-CM

## 2022-12-22 DIAGNOSIS — O36.5931 POOR FETAL GROWTH AFFECTING MANAGEMENT OF MOTHER IN THIRD TRIMESTER, FETUS 1 OF MULTIPLE GESTATION: ICD-10-CM

## 2022-12-22 DIAGNOSIS — O09.813 PREGNANCY RESULTING FROM IN VITRO FERTILIZATION IN THIRD TRIMESTER: ICD-10-CM

## 2022-12-22 DIAGNOSIS — O43.123 VELAMENTOUS INSERTION OF UMBILICAL CORD IN THIRD TRIMESTER: ICD-10-CM

## 2022-12-22 PROCEDURE — 76817 TRANSVAGINAL US OBSTETRIC: CPT | Performed by: OBSTETRICS & GYNECOLOGY

## 2022-12-22 PROCEDURE — 76815 OB US LIMITED FETUS(S): CPT | Performed by: OBSTETRICS & GYNECOLOGY

## 2022-12-22 PROCEDURE — 76821 MIDDLE CEREBRAL ARTERY ECHO: CPT | Performed by: OBSTETRICS & GYNECOLOGY

## 2022-12-22 PROCEDURE — 76820 UMBILICAL ARTERY ECHO: CPT | Performed by: OBSTETRICS & GYNECOLOGY

## 2022-12-22 PROCEDURE — 76819 FETAL BIOPHYS PROFIL W/O NST: CPT | Performed by: OBSTETRICS & GYNECOLOGY

## 2023-01-03 ENCOUNTER — ROUTINE PRENATAL (OUTPATIENT)
Dept: OBGYN CLINIC | Age: 33
End: 2023-01-03
Payer: OTHER GOVERNMENT

## 2023-01-03 ENCOUNTER — HOSPITAL ENCOUNTER (OUTPATIENT)
Age: 33
Setting detail: SPECIMEN
Discharge: HOME | End: 2023-01-03

## 2023-01-03 VITALS — SYSTOLIC BLOOD PRESSURE: 118 MMHG | WEIGHT: 163 LBS | BODY MASS INDEX: 26.31 KG/M2 | DIASTOLIC BLOOD PRESSURE: 62 MMHG

## 2023-01-03 DIAGNOSIS — O36.5990 PREGNANCY AFFECTED BY FETAL GROWTH RESTRICTION: ICD-10-CM

## 2023-01-03 DIAGNOSIS — O09.90 HIGH RISK PREGNANCY, ANTEPARTUM: Primary | ICD-10-CM

## 2023-01-03 DIAGNOSIS — O09.90 HIGH RISK PREGNANCY, ANTEPARTUM: ICD-10-CM

## 2023-01-03 DIAGNOSIS — Z23 NEED FOR VACCINATION: ICD-10-CM

## 2023-01-03 DIAGNOSIS — O30.043 DICHORIONIC DIAMNIOTIC TWIN PREGNANCY IN THIRD TRIMESTER: ICD-10-CM

## 2023-01-03 DIAGNOSIS — R00.2 PALPITATIONS: ICD-10-CM

## 2023-01-03 LAB
ANION GAP SERPL CALCULATED.3IONS-SCNC: 13 MMOL/L (ref 9–17)
BUN BLDV-MCNC: 11 MG/DL (ref 6–20)
CALCIUM SERPL-MCNC: 9.7 MG/DL (ref 8.6–10.4)
CHLORIDE BLD-SCNC: 101 MMOL/L (ref 98–107)
CO2: 21 MMOL/L (ref 20–31)
CREAT SERPL-MCNC: 0.57 MG/DL (ref 0.5–0.9)
GFR SERPL CREATININE-BSD FRML MDRD: >60 ML/MIN/1.73M2
GLUCOSE BLD-MCNC: 75 MG/DL (ref 70–99)
HCT VFR BLD CALC: 34.7 % (ref 36.3–47.1)
HEMOGLOBIN: 11.5 G/DL (ref 11.9–15.1)
MAGNESIUM: 2.1 MG/DL (ref 1.6–2.6)
MCH RBC QN AUTO: 30.5 PG (ref 25.2–33.5)
MCHC RBC AUTO-ENTMCNC: 33.1 G/DL (ref 28.4–34.8)
MCV RBC AUTO: 92 FL (ref 82.6–102.9)
NRBC AUTOMATED: 0 PER 100 WBC
PDW BLD-RTO: 12.4 % (ref 11.8–14.4)
PLATELET # BLD: 169 K/UL (ref 138–453)
PMV BLD AUTO: 12.4 FL (ref 8.1–13.5)
POTASSIUM SERPL-SCNC: 4.7 MMOL/L (ref 3.7–5.3)
RBC # BLD: 3.77 M/UL (ref 3.95–5.11)
SODIUM BLD-SCNC: 135 MMOL/L (ref 135–144)
TSH SERPL DL<=0.05 MIU/L-ACNC: 2.11 UIU/ML (ref 0.3–5)
WBC # BLD: 11.4 K/UL (ref 3.5–11.3)

## 2023-01-03 PROCEDURE — 0502F SUBSEQUENT PRENATAL CARE: CPT | Performed by: NURSE PRACTITIONER

## 2023-01-03 PROCEDURE — 90471 IMMUNIZATION ADMIN: CPT | Performed by: NURSE PRACTITIONER

## 2023-01-03 PROCEDURE — 59025 FETAL NON-STRESS TEST: CPT | Performed by: NURSE PRACTITIONER

## 2023-01-03 PROCEDURE — 90715 TDAP VACCINE 7 YRS/> IM: CPT | Performed by: NURSE PRACTITIONER

## 2023-01-03 PROCEDURE — 93000 ELECTROCARDIOGRAM COMPLETE: CPT | Performed by: NURSE PRACTITIONER

## 2023-01-03 NOTE — PATIENT INSTRUCTIONS
After Hours Number: You can call the office [phone]  or [phone]  Call if you have:  1. Bleeding like a period  2. Cramps or contractions greater than 2 hours  3. If you are leaking fluid  4. If you've a fever greater than 100°  5. If you feel as if baby is not moving  6. If you have continuous vomiting over 3-4 hours   Counting Your Baby's Kicks: Care Instructions  Your Care Instructions    Counting your baby's kicks is one way your doctor can tell that your baby is healthy. Most women--especially in a first pregnancy--feel their baby move for the first time between 16 and 22 weeks. The movement may feel like flutters rather than kicks. Your baby may move more at certain times of the day. When you are active, you may notice less kicking than when you are resting. At your prenatal visits, your doctor will ask whether the baby is active. In your last trimester, your doctor may ask you to count the number of times you feel your baby move. Follow-up care is a key part of your treatment and safety. Be sure to make and go to all appointments, and call your doctor if you are having problems. It's also a good idea to know your test results and keep a list of the medicines you take. How do you count fetal kicks? A common method of checking your baby's movement is to count the number of kicks or moves you feel in 1 hour. Ten movements (such as kicks, flutters, or rolls) in 1 hour are normal. Some doctors suggest that you count in the morning until you get to 10 movements. Then you can quit for that day and start again the next day. Pick your baby's most active time of day to count. This may be any time from morning to evening. If you do not feel 10 movements in an hour, your baby may be sleeping. Wait for the next hour and count again. When should you call for help?   Call your doctor now or seek immediate medical care if:    You noticed that your baby has stopped moving or is moving much less than normal.    Watch closely for changes in your health, and be sure to contact your doctor if you have any problems. Where can you learn more? Go to https://chpepiceweb.[email or web address] org and sign in to your [employer] account. Enter A772 in the GreenDust box to learn more about \"Counting Your Baby's Kicks: Care Instructions. \"     If you do not have an account, please click on the \"Sign Up Now\" link. Current as of: September 5, 2018  Content Version: 12.0  © 5922-3667 [employer]. Care instructions adapted under license by [facility] ([facility]). If you have questions about a medical condition or this instruction, always ask your healthcare professional. [address] any warranty or liability for your use of this information. Preeclampsia: Care Instructions  Overview    Preeclampsia occurs when a woman's blood pressure rises during pregnancy. Often with preeclampsia, you also have swelling in your legs, hands, and face. A test may show too much protein in your urine. Preeclampsia is also called toxemia. If preeclampsia is severe and not treated, it can lead to seizures (eclampsia) and damage to your liver or kidneys. Preeclampsia can prevent your baby from getting enough food and oxygen. This can cause a low birth weight or other problems. Your doctor will watch you closely to prevent these problems. He or she also may recommend that you reduce your activity. If your preeclampsia is a danger to your health or the health of your baby, your doctor may need to deliver your baby early. While preeclampsia is a concern, most women with preeclampsia have healthy babies. After a woman gives birth, preeclampsia usually goes away on its own. But symptoms may last a few weeks or more and can get worse after delivery. Rarely, symptoms of preeclampsia don't show up until days or even weeks after childbirth. Follow-up care is a key part of your treatment and safety.  Be sure to make and go to all appointments, and call your doctor if you are having problems. It's also a good idea to know your test results and keep a list of the medicines you take. How can you care for yourself at home? Take and record your blood pressure at home if your doctor tells you to. Learn the importance of the two measures of blood pressure (such as 120 over 80, or 120/80). The first number is the systolic pressure, which is the force of blood on the artery walls as the heart pumps. The second number is the diastolic pressure, which is the force of blood on the artery walls between heartbeats, when the heart is at rest. You have a choice of monitors to use. Manual monitor: You pump up the cuff and use a stethoscope to listen for your pulse. Electronic monitor: The cuff inflates, and a gauge shows your pulse rate. To take your blood pressure:  Ask your doctor to check your blood pressure monitor to be sure that it is accurate and that the cuff fits you. Also ask your doctor to watch you to make sure that you are using it right. You should not eat, use tobacco products, or use medicine known to raise blood pressure (such as some nasal decongestant sprays) before you take your blood pressure. Avoid taking your blood pressure if you have just exercised. Also avoid taking it if you are nervous or upset. Rest at least 15 minutes before you take your blood pressure. If your doctor advises, check the protein levels in your urine. Your doctor or nurse will show you how to do this. Take your medicines exactly as prescribed. Call your doctor if you think you are having a problem with your medicine. Do not smoke. Quitting smoking will help improve your baby's growth and health. If you need help quitting, talk to your doctor about stop-smoking programs and medicines. These can increase your chances of quitting for good. Eat a balanced and healthy diet that has lots of fruits and vegetables.   If your doctor advised bed rest, be sure to stay off your feet and rest as much as possible. Keep a phone, phone book, notepad, and pen near the bed where you can easily reach them. Gently stretch your legs every hour to maintain good blood flow. Have another family member pack snacks and lunch food in a cooler close to your bed. Use this time for activities that you usually cannot find time for, such as reading, craft projects, or letter writing. You can keep track of your baby's health by noting the length of time it takes to count 10 movements (such as kicks, flutters, or rolls). Feeling 10 movements in less than 1 hour is considered normal. Track your baby's movements once each day. Bring this record with you to each prenatal visit. When should you call for help? Call 911 anytime you think you may need emergency care. For example, call if:    You passed out (lost consciousness). You have a seizure. Call your doctor now or seek immediate medical care if:    You have symptoms of preeclampsia, such as:  Sudden swelling of your face, hands, or feet. New vision problems (such as dimness or blurring). A severe headache. Your blood pressure is higher than it should be, or it rises suddenly. You have new nausea or vomiting. You think that you are in labor. You have pain in your belly or pelvis. Watch closely for changes in your health, and be sure to contact your doctor if:    You gain weight rapidly. Where can you learn more? Go to https://FlipteramandoUpside.Cartasite. org and sign in to your 6connect account. Enter J517 in the [facility] box to learn more about \"Preeclampsia: Care Instructions. \"     If you do not have an account, please click on the \"Sign Up Now\" link. Current as of: September 5, 2018  Content Version: 12.0  © 3926-2388 Healthwise[employer]. Care instructions adapted under license by [facility] ([facility]).  If you have questions about a medical condition or this instruction, always ask your healthcare professional. [place] [address] any warranty or liability for your use of this information.  Labor: Care Instructions  Your Care Instructions     labor is the start of labor between 21 and 36 weeks of pregnancy. A full-term pregnancy lasts 37 to 42 weeks. In labor, the uterus contracts to open the cervix. This is the first stage of childbirth.  labor can be caused by a problem with the baby, the mother, or both. Often the cause is not known. In some cases, doctors use medicines to try to delay labor until 29 or more weeks of pregnancy. By this time, a baby has grown enough so that problems are not likely. In some cases--such as with a serious infection--it is healthier for the baby to be born early. Your treatment will depend on how far along you are in your pregnancy and on your health and your baby's health. Follow-up care is a key part of your treatment and safety. Be sure to make and go to all appointments, and call your doctor if you are having problems. It's also a good idea to know your test results and keep a list of the medicines you take. How can you care for yourself at home? If your doctor prescribed medicines, take them exactly as directed. Call your doctor if you think you are having a problem with your medicine. Rest until your doctor advises you about activity. He or she will tell you if you should stay in bed most of the time. You may need to arrange for  if you have young children. Do not have sexual intercourse unless your doctor says it is safe. Use pads, not tampons, if you have vaginal bleeding. Make sure to drink plenty of fluids. Dehydration can lead to contractions. If you have kidney, heart, or liver disease and have to limit fluids, talk with your doctor before you increase the amount of fluids you drink. Do not smoke or allow others to smoke around you.  If you need help quitting, talk to your doctor about stop-smoking programs and medicines. These can increase your chances of quitting for good. When should you call for help? Call 911 anytime you think you may need emergency care. For example, call if:    You passed out (lost consciousness). You have severe vaginal bleeding. You have severe pain in your belly or pelvis. You have had fluid gushing or leaking from your vagina and you know or think the umbilical cord is bulging into your vagina. If this happens, immediately get down on your knees so your rear end (buttocks) is higher than your head. This will decrease the pressure on the cord until help arrives. Call your doctor now or seek immediate medical care if:    You have signs of preeclampsia, such as:  Sudden swelling of your face, hands, or feet. New vision problems (such as dimness or blurring). A severe headache. You have any vaginal bleeding. You have belly pain or cramping. You have a fever. You have had regular contractions (with or without pain) for an hour. This means that you have 6 or more within 1 hour after you change your position and drink fluids. You have a sudden release of fluid from the vagina. You have low back pain or pelvic pressure that does not go away. You notice that your baby has stopped moving or is moving much less than normal.    Watch closely for changes in your health, and be sure to contact your doctor if you have any problems. Where can you learn more? Go to https://NoteSickjonoeb.Poetica. org and sign in to your [employer] account. Enter Q400 in the [facility] box to learn more about \" Labor: Care Instructions. \"     If you do not have an account, please click on the \"Sign Up Now\" link. Current as of: 2018  Content Version: 12.0  © 7659-7005 Healthwise, [employer]. Care instructions adapted under license by [facility] ([facility]).  If you have questions about a medical condition or this instruction, always ask your healthcare professional. [place] [address] any warranty or liability for your use of this information.

## 2023-01-03 NOTE — PROGRESS NOTES
S:  Here for NST for fetal surveillance secondary to high risk twin pregnancy- FGR- fetus A    Headaches: no  Swelling: no  Bleeding: no  Discharge: no   Dysuria: no  Nausea: no  Heartburn: no  Epigastric pain: no  Contractions: no  Leakage of fluid: no  + fetal movement     She states has intermittently noted palpitations, she states at grocery store noted them this weekend and felt somewhat short of breath and dizzy but rested and symptoms resolved. No syncope. Denies current palpitations, chest pain, shortness of breath or dizziness. Insurance  [place]     DOES NOT WANT TO KNOW GENDER OF BABIES baby A on left baby B on right    G1P    Plans to deliver: [facility]    FOB dorian    1st trimester screen/NIPs:     AFP- NEG    Fetal Echo    High Risk:  Di/Di Twins  IVF   testing to be done at 32 weeks- dopplers at [facility], NST here in office- all set up    Early 1 hr GTT:    Covid Vaccine: Yes Moderna and booster     Flu Vaccine: given 22    Tdap: given 1/3/23    Rhogam: n/a, A+    1hr GTT: passed 102 on     3hr GTT:    GBS:    2nd trimester appointment with Dr. Ashkan Espinal :     3rd trimester appointment with Dr. Ashkan Espinal :     [facility]       O:  Ladean Lav:    23 1304   BP: 118/62   Weight: 163 lb (73.9 kg)      Fetus A:   Baseline:  125  Variability:  Moderate  Accelerations:  Present  Decelerations:  Absent  Fetal Movement:  Perceived by patient during NST    Fetus B:   Baseline:  130  Variability:  Moderate  Accelerations:  Present  Decelerations:  Absent  Fetal Movement:  Perceived by patient during NST      A/P:    1. 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 SHE HAS HAD TO STOP WORKING DO TO HIGH RISK PREGNANCY, APPOINTMENTS AND MONITORING OF TWIN PREGNANCY WITH FETAL GROWTH RESTRICTION. 2. Dichorionic diamniotic twin pregnancy in third trimester  - rNST fetus A & B  - MO FETAL NONSTRESS TEST    3. Palpitations  Discussed can be normal for pregnancy. The red flags to look out for are acute shortness of breath, chest pain, pain with activity. Sometimes women get this from fluid shifts. Since she was noting shortness of breath and dizziness with it over weekend  I would like to get labs,  an EKG and possibly a cardiology consult, depending on if persist or worsen  - CBC; Future  - TSH With Reflex Ft4; Future  - Basic Metabolic Panel; Future  - Magnesium; Future  - EKG 12 lead    4. Pregnancy affected by fetal growth restriction  - rNST fetus A & B  - MO FETAL NONSTRESS TEST    5. Need for vaccination    - Tdap, BOOSTRIX, (age 8 yrs+), IM  - MO THERAPEUTIC PROPHYLACTIC/DX INJECTION SUBQ/IM      Reactive NST    Keep scheduled fetal surveillance appointment- NST 2 TIMES WEEK AND BPP/DOPPLER WEEKLY    Continue Fetal Kick Counts     Of the 30 minute duration appointment visit, George Qureshi CNP spent at least 50% of the face-to-face time in counseling, explanation of diagnosis, planning of further management, and answering all questions.

## 2023-01-05 ENCOUNTER — ROUTINE PRENATAL (OUTPATIENT)
Dept: PERINATAL CARE | Age: 33
End: 2023-01-05
Payer: OTHER GOVERNMENT

## 2023-01-05 VITALS
RESPIRATION RATE: 16 BRPM | TEMPERATURE: 97.7 F | WEIGHT: 162 LBS | HEIGHT: 66 IN | HEART RATE: 85 BPM | DIASTOLIC BLOOD PRESSURE: 77 MMHG | BODY MASS INDEX: 26.03 KG/M2 | SYSTOLIC BLOOD PRESSURE: 114 MMHG

## 2023-01-05 DIAGNOSIS — Z3A.32 32 WEEKS GESTATION OF PREGNANCY: ICD-10-CM

## 2023-01-05 DIAGNOSIS — O36.5931 POOR FETAL GROWTH AFFECTING MANAGEMENT OF MOTHER IN THIRD TRIMESTER, FETUS 1 OF MULTIPLE GESTATION: ICD-10-CM

## 2023-01-05 DIAGNOSIS — O43.123 VELAMENTOUS INSERTION OF UMBILICAL CORD IN THIRD TRIMESTER: ICD-10-CM

## 2023-01-05 DIAGNOSIS — Z36.4 ULTRASOUND FOR ANTENATAL SCREENING FOR FETAL GROWTH RESTRICTION: ICD-10-CM

## 2023-01-05 DIAGNOSIS — O30.043 DICHORIONIC DIAMNIOTIC TWIN PREGNANCY IN THIRD TRIMESTER: Primary | ICD-10-CM

## 2023-01-05 DIAGNOSIS — O09.813 PREGNANCY RESULTING FROM IN VITRO FERTILIZATION IN THIRD TRIMESTER: ICD-10-CM

## 2023-01-05 DIAGNOSIS — O36.5932 POOR FETAL GROWTH AFFECTING MANAGEMENT OF MOTHER IN THIRD TRIMESTER, FETUS 2 OF MULTIPLE GESTATION: ICD-10-CM

## 2023-01-05 PROBLEM — O36.5990 FETAL GROWTH RESTRICTION ANTEPARTUM: Status: ACTIVE | Noted: 2023-01-05

## 2023-01-05 PROCEDURE — 76820 UMBILICAL ARTERY ECHO: CPT | Performed by: OBSTETRICS & GYNECOLOGY

## 2023-01-05 PROCEDURE — 76821 MIDDLE CEREBRAL ARTERY ECHO: CPT | Performed by: OBSTETRICS & GYNECOLOGY

## 2023-01-05 PROCEDURE — 76817 TRANSVAGINAL US OBSTETRIC: CPT | Performed by: OBSTETRICS & GYNECOLOGY

## 2023-01-05 PROCEDURE — 76816 OB US FOLLOW-UP PER FETUS: CPT | Performed by: OBSTETRICS & GYNECOLOGY

## 2023-01-05 PROCEDURE — 76819 FETAL BIOPHYS PROFIL W/O NST: CPT | Performed by: OBSTETRICS & GYNECOLOGY

## 2023-01-08 ENCOUNTER — APPOINTMENT (OUTPATIENT)
Dept: GENERAL RADIOLOGY | Age: 33
End: 2023-01-08
Payer: OTHER GOVERNMENT

## 2023-01-08 ENCOUNTER — APPOINTMENT (OUTPATIENT)
Dept: CT IMAGING | Age: 33
End: 2023-01-08
Payer: OTHER GOVERNMENT

## 2023-01-08 ENCOUNTER — HOSPITAL ENCOUNTER (EMERGENCY)
Age: 33
Discharge: HOME | End: 2023-01-08
Attending: EMERGENCY MEDICINE
Payer: OTHER GOVERNMENT

## 2023-01-08 ENCOUNTER — HOSPITAL ENCOUNTER (OUTPATIENT)
Age: 33
Discharge: HOME | End: 2023-01-08
Attending: OBSTETRICS & GYNECOLOGY | Admitting: OBSTETRICS & GYNECOLOGY
Payer: OTHER GOVERNMENT

## 2023-01-08 VITALS
TEMPERATURE: 98.1 F | DIASTOLIC BLOOD PRESSURE: 87 MMHG | OXYGEN SATURATION: 99 % | SYSTOLIC BLOOD PRESSURE: 125 MMHG | HEART RATE: 90 BPM | RESPIRATION RATE: 18 BRPM

## 2023-01-08 VITALS
OXYGEN SATURATION: 98 % | DIASTOLIC BLOOD PRESSURE: 83 MMHG | HEART RATE: 84 BPM | SYSTOLIC BLOOD PRESSURE: 126 MMHG | TEMPERATURE: 98 F | RESPIRATION RATE: 16 BRPM

## 2023-01-08 DIAGNOSIS — R04.2 HEMOPTYSIS: Primary | ICD-10-CM

## 2023-01-08 PROBLEM — Z3A.33 33 WEEKS GESTATION OF PREGNANCY: Status: ACTIVE | Noted: 2023-01-08

## 2023-01-08 LAB
ABSOLUTE EOS #: 0.22 K/UL (ref 0–0.44)
ABSOLUTE IMMATURE GRANULOCYTE: 0.07 K/UL (ref 0–0.3)
ABSOLUTE LYMPH #: 1.71 K/UL (ref 1.1–3.7)
ABSOLUTE MONO #: 1 K/UL (ref 0.1–1.2)
ALBUMIN SERPL-MCNC: 3.4 G/DL (ref 3.5–5.2)
ALBUMIN/GLOBULIN RATIO: 1.1 (ref 1–2.5)
ALP BLD-CCNC: 123 U/L (ref 35–104)
ALT SERPL-CCNC: 14 U/L (ref 5–33)
ANION GAP SERPL CALCULATED.3IONS-SCNC: 10 MMOL/L (ref 9–17)
AST SERPL-CCNC: 19 U/L
BACTERIA: ABNORMAL
BASOPHILS # BLD: 0 % (ref 0–2)
BASOPHILS ABSOLUTE: 0.04 K/UL (ref 0–0.2)
BILIRUB SERPL-MCNC: 0.2 MG/DL (ref 0.3–1.2)
BILIRUBIN URINE: NEGATIVE
BUN BLDV-MCNC: 11 MG/DL (ref 6–20)
CALCIUM SERPL-MCNC: 8.9 MG/DL (ref 8.6–10.4)
CASTS UA: ABNORMAL /LPF (ref 0–8)
CHLORIDE BLD-SCNC: 101 MMOL/L (ref 98–107)
CO2: 21 MMOL/L (ref 20–31)
COLOR: YELLOW
CREAT SERPL-MCNC: 0.64 MG/DL (ref 0.5–0.9)
D-DIMER QUANTITATIVE: 3.66 MG/L FEU
EOSINOPHILS RELATIVE PERCENT: 2 % (ref 1–4)
EPITHELIAL CELLS UA: ABNORMAL /HPF (ref 0–5)
FLU A ANTIGEN: NEGATIVE
FLU B ANTIGEN: NEGATIVE
GFR SERPL CREATININE-BSD FRML MDRD: >60 ML/MIN/1.73M2
GLUCOSE BLD-MCNC: 98 MG/DL (ref 70–99)
GLUCOSE URINE: NEGATIVE
HCT VFR BLD CALC: 33.7 % (ref 36.3–47.1)
HEMOGLOBIN: 11.5 G/DL (ref 11.9–15.1)
IMMATURE GRANULOCYTES: 1 %
KETONES, URINE: NEGATIVE
LEUKOCYTE ESTERASE, URINE: NEGATIVE
LYMPHOCYTES # BLD: 16 % (ref 24–43)
MAGNESIUM: 1.8 MG/DL (ref 1.6–2.6)
MCH RBC QN AUTO: 30.7 PG (ref 25.2–33.5)
MCHC RBC AUTO-ENTMCNC: 34.1 G/DL (ref 28.4–34.8)
MCV RBC AUTO: 90.1 FL (ref 82.6–102.9)
MONOCYTES # BLD: 9 % (ref 3–12)
NITRITE, URINE: NEGATIVE
NRBC AUTOMATED: 0 PER 100 WBC
PDW BLD-RTO: 12.3 % (ref 11.8–14.4)
PH UA: 6.5 (ref 5–8)
PLATELET # BLD: 156 K/UL (ref 138–453)
PMV BLD AUTO: 12.7 FL (ref 8.1–13.5)
POTASSIUM SERPL-SCNC: 3.7 MMOL/L (ref 3.7–5.3)
PROTEIN UA: ABNORMAL
RBC # BLD: 3.74 M/UL (ref 3.95–5.11)
RBC UA: ABNORMAL /HPF (ref 0–4)
SARS-COV-2, RAPID: NOT DETECTED
SEG NEUTROPHILS: 72 % (ref 36–65)
SEGMENTED NEUTROPHILS ABSOLUTE COUNT: 7.7 K/UL (ref 1.5–8.1)
SODIUM BLD-SCNC: 132 MMOL/L (ref 135–144)
SPECIFIC GRAVITY UA: 1.01 (ref 1–1.03)
SPECIMEN DESCRIPTION: NORMAL
TOTAL PROTEIN: 6.4 G/DL (ref 6.4–8.3)
TURBIDITY: ABNORMAL
URINE HGB: ABNORMAL
UROBILINOGEN, URINE: NORMAL
WBC # BLD: 10.7 K/UL (ref 3.5–11.3)
WBC UA: ABNORMAL /HPF (ref 0–5)

## 2023-01-08 PROCEDURE — 6360000004 HC RX CONTRAST MEDICATION: Performed by: STUDENT IN AN ORGANIZED HEALTH CARE EDUCATION/TRAINING PROGRAM

## 2023-01-08 PROCEDURE — 99213 OFFICE O/P EST LOW 20 MIN: CPT

## 2023-01-08 PROCEDURE — 83735 ASSAY OF MAGNESIUM: CPT

## 2023-01-08 PROCEDURE — 59025 FETAL NON-STRESS TEST: CPT

## 2023-01-08 PROCEDURE — 86403 PARTICLE AGGLUT ANTBDY SCRN: CPT

## 2023-01-08 PROCEDURE — 93005 ELECTROCARDIOGRAM TRACING: CPT | Performed by: STUDENT IN AN ORGANIZED HEALTH CARE EDUCATION/TRAINING PROGRAM

## 2023-01-08 PROCEDURE — 87635 SARS-COV-2 COVID-19 AMP PRB: CPT

## 2023-01-08 PROCEDURE — 71045 X-RAY EXAM CHEST 1 VIEW: CPT

## 2023-01-08 PROCEDURE — 99285 EMERGENCY DEPT VISIT HI MDM: CPT

## 2023-01-08 PROCEDURE — 87086 URINE CULTURE/COLONY COUNT: CPT

## 2023-01-08 PROCEDURE — 85025 COMPLETE CBC W/AUTO DIFF WBC: CPT

## 2023-01-08 PROCEDURE — 80053 COMPREHEN METABOLIC PANEL: CPT

## 2023-01-08 PROCEDURE — 71260 CT THORAX DX C+: CPT | Performed by: STUDENT IN AN ORGANIZED HEALTH CARE EDUCATION/TRAINING PROGRAM

## 2023-01-08 PROCEDURE — 85379 FIBRIN DEGRADATION QUANT: CPT

## 2023-01-08 PROCEDURE — 87804 INFLUENZA ASSAY W/OPTIC: CPT

## 2023-01-08 PROCEDURE — 81001 URINALYSIS AUTO W/SCOPE: CPT

## 2023-01-08 RX ORDER — ACETAMINOPHEN 500 MG
1000 TABLET ORAL EVERY 6 HOURS PRN
Status: DISCONTINUED | OUTPATIENT
Start: 2023-01-08 | End: 2023-01-09 | Stop reason: HOSPADM

## 2023-01-08 RX ORDER — ONDANSETRON 2 MG/ML
4 INJECTION INTRAMUSCULAR; INTRAVENOUS EVERY 6 HOURS PRN
Status: DISCONTINUED | OUTPATIENT
Start: 2023-01-08 | End: 2023-01-09 | Stop reason: HOSPADM

## 2023-01-08 RX ORDER — ONDANSETRON 4 MG/1
4 TABLET, ORALLY DISINTEGRATING ORAL EVERY 8 HOURS PRN
Status: DISCONTINUED | OUTPATIENT
Start: 2023-01-08 | End: 2023-01-09 | Stop reason: HOSPADM

## 2023-01-08 RX ADMIN — IOPAMIDOL 75 ML: 755 INJECTION, SOLUTION INTRAVENOUS at 20:27

## 2023-01-08 ASSESSMENT — ENCOUNTER SYMPTOMS
RHINORRHEA: 0
BACK PAIN: 0
ABDOMINAL PAIN: 0
DIARRHEA: 0
SHORTNESS OF BREATH: 0
NAUSEA: 0
COUGH: 0
VOMITING: 0

## 2023-01-08 ASSESSMENT — PAIN - FUNCTIONAL ASSESSMENT: PAIN_FUNCTIONAL_ASSESSMENT: NONE - DENIES PAIN

## 2023-01-08 NOTE — ED TRIAGE NOTES
Patient ambulated to room 30 from triage with c/o of waking up this morning and having an episode of coughing up a decent amount of blood. Patient is currently 33 weeks pregnant with twins. Patient states they have been moving. Patient takes a baby ASA daily. Patient states she currently hasnt been sick. No hx of blood clots. Pt respirations are even and unlabored, pt is alert and oriented X 4, speaking in complete sentences, bed is in the lowest position, call light is within reach.

## 2023-01-08 NOTE — ED PROVIDER NOTES
[facility]  ED  Emergency Department Encounter  Emergency Medicine Resident     Pt Name:Radha Jara  MRN: [id number]  [place] 1990  Date of evaluation: 1/8/23  PCP:  Elroy Patel MD      CHIEF COMPLAINT       Chief Complaint   Patient presents with    Hemoptysis     33 weeks pregnant       HISTORY OF PRESENT ILLNESS  (Location/Symptom, Timing/Onset, Context/Setting, Quality, Duration, Modifying Factors, Severity.)      Sanna Luke is a 29 y.o. female [de-identified], P0 at 35 weeks gestation with twins who presents with concerns of hemoptysis. Patient states she ate lunch today and then had a small amount of blood-tinged sputum with a cough. She has been congested recently but states she has not had a frequent cough. She sometimes coughs after eating. She feels short of breath pregnancy but no significant change in this. She is also felt more lightheaded recently. She denies any abdominal pain, nausea or vomiting. No urinary or bowel complaints. No vaginal discharge or vaginal bleeding. No history of clotting disorders. Patient is on prenatal vitamins and aspirin, pregnancy has otherwise been uncomplicated. PAST MEDICAL / SURGICAL / SOCIAL / FAMILY HISTORY      has a past medical history of Anxiety, C. difficile colitis, Depression, Environmental allergies, and Seasonal allergic rhinitis. has a past surgical history that includes [place] tooth extraction and Tonsillectomy and adenoidectomy (1999).       Social History     Socioeconomic History    Marital status:      Spouse name: Not on file    Number of children: Not on file    Years of education: Not on file    Highest education level: Not on file   Occupational History    Not on file   Tobacco Use    Smoking status: Never    Smokeless tobacco: Never   Vaping Use    Vaping Use: Never used   Substance and Sexual Activity    Alcohol use: Not Currently    Drug use: No    Sexual activity: Yes     Partners: Male Other Topics Concern    Not on file   Social History Narrative    ** Merged History Encounter **          Social Determinants of Health     Financial Resource Strain: Low Risk     Difficulty of Paying Living Expenses: Not hard at all   Food Insecurity: No Food Insecurity    Worried About Running Out of Food in the Last Year: Never true    Ran Out of Food in the Last Year: Never true   Transportation Needs: Not on file   Physical Activity: Not on file   Stress: Not on file   Social Connections: Not on file   Intimate Partner Violence: Not on file   Housing Stability: Not on file       Family History   Problem Relation Age of Onset    High Blood Pressure Father     High Cholesterol Father     Cancer Other        Allergies:  Shellfish-derived products    Home Medications:  Prior to Admission medications    Medication Sig Start Date End Date Taking? Authorizing Provider   [facility] (BREAST PUMP) MIS Double electric breast pump  Patient not taking: Reported on 12/22/2022 12/13/22   DENTON Flores - CNP   aspirin 81 MG chewable tablet Take 81 mg by mouth in the morning. Historical Provider, MD   [facility]    Historical Provider, MD   cetirizine (ZYRTEC) 10 MG tablet Take 10 mg by mouth daily  Patient not taking: No sig reported    Historical Provider, MD         REVIEW OF SYSTEMS       Review of Systems   Constitutional:  Negative for chills and fever. HENT:  Positive for congestion. Negative for rhinorrhea. Eyes:  Negative for visual disturbance. Respiratory:  Negative for cough and shortness of breath. Hemoptysis   Cardiovascular:  Negative for chest pain. Gastrointestinal:  Negative for abdominal pain, diarrhea, nausea and vomiting. Genitourinary:  Negative for dysuria, frequency and hematuria. Musculoskeletal:  Negative for back pain and neck pain. Skin:  Negative for rash. Neurological:  Positive for light-headedness.  Negative for weakness, numbness and headaches. PHYSICAL EXAM      INITIAL VITALS:   /83   Pulse 84   Temp 98 °F (36.7 °C) (Oral)   Resp 16   LMP 02/20/2022   SpO2 98%     Physical Exam  Constitutional:       General: She is not in acute distress. Appearance: Normal appearance. She is normal weight. She is not ill-appearing, toxic-appearing or diaphoretic. HENT:      Head: Normocephalic and atraumatic. Nose: Nose normal.      Mouth/Throat:      Mouth: Mucous membranes are moist.      Pharynx: Oropharynx is clear. No oropharyngeal exudate or posterior oropharyngeal erythema. Eyes:      Extraocular Movements: Extraocular movements intact. Pupils: Pupils are equal, round, and reactive to light. Cardiovascular:      Rate and Rhythm: Normal rate and regular rhythm. Heart sounds: Normal heart sounds. No murmur heard. Pulmonary:      Effort: Pulmonary effort is normal. No respiratory distress. Breath sounds: Normal breath sounds. No wheezing, rhonchi or rales. Abdominal:      General: There is no distension. Tenderness: There is no abdominal tenderness. There is no guarding or rebound. Comments: Abdomen is soft, gravid   Musculoskeletal:         General: No tenderness. Normal range of motion. Cervical back: Normal range of motion and neck supple. Right lower leg: No edema. Left lower leg: No edema. Skin:     General: Skin is warm and dry. Neurological:      General: No focal deficit present. Mental Status: She is alert and oriented to person, place, and time. Motor: No weakness. Psychiatric:         Mood and Affect: Mood normal.         DDX/DIAGNOSTIC RESULTS / EMERGENCY DEPARTMENT COURSE / MDM     Medical Decision Making  46-year-old female G0, P0 at 35 weeks gestation with twins presenting with small amount of hemoptysis today. Mild shortness of breath but no change from her shortness of breath at baseline during pregnancy.   She is also felt more lightheaded, has been congested for a few weeks. Vitals demonstrate tachycardia with rates  during my evaluation. SPO2 97%. Heart and lung exam are otherwise unremarkable. She does not have any abdominal tenderness. No other focal findings on exam.  Will obtain labs and discussed with the patient that there is some suspicion for pulmonary embolism and definitive test for this is a CT. Amount and/or Complexity of Data Reviewed  Labs: ordered. Decision-making details documented in ED Course. Radiology: ordered. Decision-making details documented in ED Course. ECG/medicine tests: ordered. Risk  Prescription drug management. EKG      All EKG's are interpreted by the Emergency Department Physician who either signs or Co-signs this chart in the absence of a cardiologist.    EMERGENCY DEPARTMENT COURSE:      ED Course as of 01/09/23 0209   [facility] Jan 08, 2023   1624 SARS-CoV-2, Rapid: Not Detected [JS]   1624 Flu A Antigen: NEGATIVE [JS]   1624 Flu B Antigen: NEGATIVE [JS]   1748 XR CHEST PORTABLE  IMPRESSION:  Negative chest radiograph. [JS]   2117 CT CHEST PULMONARY EMBOLISM W CONTRAST  IMPRESSION:  No pulmonary embolism or acute pulmonary abnormality. [JS]      ED Course User Index  [JS] Kenney Carpenter DO     Bedside ultrasound was performed. Baby A with heart rate of 131, baby B with heart rate of 140. Fetal movement bilaterally, greater with baby B. Discussed findings with patient and significant other. No signs of PE. No other obvious cause of hemoptysis identified. Patient feels well at this time. I also discussed with OB team who recommends sending the patient up to L&D for tracing. Patient discharged in stable condition with instructions to do so. PROCEDURES:      CONSULTS:  None    CRITICAL CARE:  There was significant risk of life threatening deterioration of patient's condition requiring my direct management.  Critical care time 0 minutes, excluding any documented procedures. FINAL IMPRESSION      1.  Hemoptysis          DISPOSITION / PLAN     DISPOSITION Decision To Discharge 01/08/2023 09:19:29 PM      PATIENT REFERRED TO:  Belem Lorenzo MD  [id number]  [address]  [phone]    Schedule an appointment as soon as possible for a visit       [facility] ED  [address] [address]  [phone]    If symptoms worsen    Jayashree Nuñez, [address] [facility] [id number] [address]  [phone]    Schedule an appointment as soon as possible for a visit       DISCHARGE MEDICATIONS:  Discharge Medication List as of 1/8/2023  9:22 PM          Kenney Carpenter DO  Emergency Medicine Resident    (Please note that portions of thisnote were completed with a voice recognition program.  Efforts were made to edit the dictations but occasionally words are mis-transcribed.)       Kenney Carpenter DO  Resident  01/09/23 [id number]

## 2023-01-08 NOTE — ED PROVIDER NOTES
[facility]  Emergency Department  Faculty Attestation     I performed a history and physical examination of the patient and discussed management with the resident. I reviewed the residents note and agree with the documented findings and plan of care. Any areas of disagreement are noted on the chart. I was personally present for the key portions of any procedures. I have documented in the chart those procedures where I was not present during the key portions. I have reviewed the emergency nurses triage note. I agree with the chief complaint, past medical history, past surgical history, allergies, medications, social and family history as documented unless otherwise noted below. For Physician Assistant/ Nurse Practitioner cases/documentation I have personally evaluated this patient and have completed at least one if not all key elements of the E/M (history, physical exam, and MDM). Additional findings are as noted. Primary Care Physician:  Candice Vivas MD    Screenings:  [unfilled]    CHIEF COMPLAINT       Chief Complaint   Patient presents with    Hemoptysis     33 weeks pregnant       RECENT VITALS:   Temp: 98 °F (36.7 °C),  Heart Rate: 99, Resp: 18, BP: (!) 143/94    LABS:  Labs Reviewed   CBC WITH AUTO DIFFERENTIAL - Abnormal; Notable for the following components:       Result Value    RBC 3.74 (*)     Hemoglobin 11.5 (*)     Hematocrit 33.7 (*)     Seg Neutrophils 72 (*)     Lymphocytes 16 (*)     Immature Granulocytes 1 (*)     All other components within normal limits   COVID-19, RAPID   RAPID INFLUENZA A/B ANTIGENS   CULTURE, URINE   COMPREHENSIVE METABOLIC PANEL   MAGNESIUM   URINALYSIS WITH MICROSCOPIC       Radiology  XR CHEST PORTABLE    (Results Pending)       CRITICAL CARE: There was a high probability of clinically significant/life threatening deterioration in this patient's condition which required my urgent intervention.   Total critical care time was none minutes. This excludes any time for separately reportable procedures. EKG:  EKG Interpretation    Interpreted by me    Rhythm: normal sinus   Rate: Normal  Axis: normal  Ectopy: none  Conduction: normal  ST Segments: Subtle ST depression anteriorly  T Waves: no acute change  Q Waves: none  Poor R wave progression anteriorly  Q3T3    Clinical Impression: Nonspecific ST changes    Attending Physician Additional  Notes    Is 33 weeks pregnant, with twins, has been having intermittent headaches which resolved with rest, intermittently gets elevations in her blood pressure but also improving with rest, no vision changes, some right-sided chest pain from baby B kicking her, occasionally leg swelling which improves with leg elevation. Primary concern is she coughed up blood-tinged sputum a small amount. No blood from the nose or throat. No recent viral syndrome symptoms. No real difficulty breathing. No history of venous thromboembolism. On exam she is tachycardic at rest, slightly upper tensive but appears anxious, no respiratory distress. Lungs are clear. Card exam is benign. Abdomen is gravid appropriate for dates, bedside ultrasound confirms good fetal heart rate and fetal movement of both twins. There is bilateral minimal calf swelling, nonpitting, with subtle tenderness in the left superficial femoral region. Impression is pregnancy, mild hypertension, hemoptysis, consider pulmonary embolism. Plan is chest x-ray, preeclampsia labs, reassess blood pressure, monitor heart rate and saturations, anticipate CT angiogram for PE. Nam James.  Abhi Ochoa MD, [facility]  Attending Emergency  Physician                Brit Valadez MD  01/08/23 [id number]

## 2023-01-09 ENCOUNTER — ROUTINE PRENATAL (OUTPATIENT)
Dept: OBGYN CLINIC | Age: 33
End: 2023-01-09
Payer: OTHER GOVERNMENT

## 2023-01-09 VITALS
SYSTOLIC BLOOD PRESSURE: 122 MMHG | BODY MASS INDEX: 26.47 KG/M2 | DIASTOLIC BLOOD PRESSURE: 68 MMHG | WEIGHT: 164 LBS | HEART RATE: 84 BPM

## 2023-01-09 DIAGNOSIS — O30.043 DICHORIONIC DIAMNIOTIC TWIN PREGNANCY IN THIRD TRIMESTER: ICD-10-CM

## 2023-01-09 DIAGNOSIS — O35.07X0 MICROCEPHALY, FETAL, AFFECTING CARE OF MOTHER, ANTEPARTUM, NOT APPLICABLE OR UNSPECIFIED FETUS: ICD-10-CM

## 2023-01-09 DIAGNOSIS — Z3A.33 33 WEEKS GESTATION OF PREGNANCY: Primary | ICD-10-CM

## 2023-01-09 DIAGNOSIS — O36.5990 PREGNANCY AFFECTED BY FETAL GROWTH RESTRICTION: ICD-10-CM

## 2023-01-09 DIAGNOSIS — O09.90 HIGH RISK PREGNANCY, ANTEPARTUM: ICD-10-CM

## 2023-01-09 LAB
CULTURE: NORMAL
EKG ATRIAL RATE: 95 BPM
EKG P AXIS: 45 DEGREES
EKG P-R INTERVAL: 116 MS
EKG Q-T INTERVAL: 354 MS
EKG QRS DURATION: 80 MS
EKG QTC CALCULATION (BAZETT): 444 MS
EKG R AXIS: 42 DEGREES
EKG T AXIS: 60 DEGREES
EKG VENTRICULAR RATE: 95 BPM
SPECIMEN DESCRIPTION: NORMAL

## 2023-01-09 PROCEDURE — 0502F SUBSEQUENT PRENATAL CARE: CPT | Performed by: OBSTETRICS & GYNECOLOGY

## 2023-01-09 PROCEDURE — 59025 FETAL NON-STRESS TEST: CPT | Performed by: OBSTETRICS & GYNECOLOGY

## 2023-01-09 NOTE — FLOWSHEET NOTE
Presents per wc after ER evaluation. Appears comfortable. Denies any labor symptoms. + fetal movement.  To triage

## 2023-01-09 NOTE — DISCHARGE INSTRUCTIONS
You were seen in the emergency department today for blood-tinged sputum. We did multiple lab tests and CT to evaluate for a clot in the lungs. This CT was negative. Please follow-up with your OB. If you have any new or worsening symptoms, please return to the ED for reevaluation. Condition  Safe Medications to Take During Pregnancy*    Allergy  Benadryl   Cold and Flu  Warm salt/water gargle  Saline nasal drops or spray   Tylenol (acetaminophen) or Tylenol Cold  Sudafed, Actifed, Dristan,    Constipation  Metamucil  Citrucil  Fiberall/Fibercon  Colace / Senekot  Milk of Magnesia   Cough Robitussin DM, Trind-DM, Sucrets, Cepacol, Vicks Cough Syrup, Romilar, Halls*   Diarrhea  For 24 hours, only after 12 weeks of pregnancy:  Kaopectate / Immodium  Parepectolin   First Aid Ointment  J & J  Bacitracin  Neosporin   Headache  Tylenol (acetaminophen)   Heartburn  Maalox  Mylanta  Pepcid  Tums / Rolaids  Zantac   Hemorrhoids  Preparation H  Anusol  Tucks  Witch hazel   Leg Cramps Viactive Chewable Calcium, Tums   Nausea and Vomiting  Vitamin B6 100 mg tablet  Emetrol (if not diabetic) / Emetrex  Sea bands  Zofran / Reglan / Phenergan / Scopolamine patch   Rashes  Hydrocortisone cream or ointment  Caladryl lotion or cream  Benadryl cream  Oatmeal bath (Aveeno)   Sore Throat Cepacol Lozenges, Rosaless cough drops or salt water   Nasal Congestion Saline  Afrin, Neosynephrine*,   pres Beclovent  Flonase, Nasonex, Ventolin    *Do not take \"SA\" (sustained action) forms of these drugs or the \"Multi-Symptom\" forms of these drugs. Yeast Infection  Monistat or Terazol  Do not insert applicator too far    *Please Note: No drug can be considered 100% safe to use during pregnancy.

## 2023-01-09 NOTE — DISCHARGE INSTRUCTIONS
Diet:   Regular Increase Fluids  8-10 glasses/day    Activities:    As tolerated          Frequent rest periods    Resume previous activity                              Additional Instructions:  Notify Physician    If any of the following                                   **Spots before eyes or blurred vision                      **Dizziness or severe Headache                           **Chills & or fever                                                      **Decrease or absence of baby movement  **Vaginal pressure                                                   **Epigastric pain                                                                                                                     **  Right sided abdominal pain  **Uterine contractions are regular & 5 min.  Apart         **Bag or blackmon leaking or gush of fluid from vagina     **Abdominal or menstrual like cramping that is constant or comes and goes  **Any vaginal bleeding that is heavier than a menstrual period  **Swelling of face, hands, legs or feet not decreased with rest on left side

## 2023-01-09 NOTE — PROGRESS NOTES
TESTING NOTE    Evelio Rosa is a 29 y.o. female  at 33w2d    The patient was seen and examined. She is here today for  testing for because she is at high risk for the following reasons: di/di twin pregnancy conceived with IVF. The babies are moving well and she denies any complaints. Patient Active Problem List   Diagnosis    Environmental allergies    Anxiety and depression    Acute recurrent maxillary sinusitis    Seasonal allergic rhinitis    Recurrent candidiasis of vagina    Gastroesophageal reflux disease    Dichorionic diamniotic twin pregnancy     Female infertility    High risk pregnancy, antepartum    IVF pregnancy    Needs flu shot    FGR Baby A and B     33 weeks gestation of pregnancy       Vitals:  Vitals:    23 2216 23 2220   BP:  125/87   Pulse:  90   Resp: 18    Temp: 98.1 °F (36.7 °C)    TempSrc: Oral    SpO2: 99% 99%       NST:   Baby A: Fetal heart rate baseline:  125 , moderate variability, accelerations present, decelerations absent  Baby B: Fetal heart rate baseline: 130, moderate variability, accelerations present, decelerations absent  The tracing has been reviewed and is considered reactive. Assessment/Plan:  Evelio Rosa is a 29 y.o. female  at 33w2d  NST   - The patient will continue with her scheduled office appointments. Next appointment is on 23.    - She will continue with her  testing as scheduled. - Signs and Symptoms of  labor precautions were reviewed. - She was counseled on adequate hydration prior to discharge   - The patient was instructed on fetal kick counts. Dr. Sabiha Farrell has reviewed this NST and agrees with the above stated assessment and plan. Discussed results with Dr. Rosario Chau who is agreeable to the above plan.      Tiffani Cook, DO  Ob/Gyn Resident   2023, 10:40 PM

## 2023-01-10 NOTE — PROGRESS NOTES
Martha Hernandez is a 29 y.o. female 33w4d        OB History    Para Term  AB Living   1 0 0 0 0     SAB IAB Ectopic Molar Multiple Live Births   0 0 0            # Outcome Date GA Lbr Casey/2nd Weight Sex Delivery Anes PTL Lv   1 Current                Vitals  BP: 122/68  Weight: 164 lb (74.4 kg)  Heart Rate: 84  Patient Position: Sitting      The patient was seen and evaluated. There was positive fetal movements. No contractions or leakage of fluid. Signs and symptoms of  labor as well as labor were reviewed. The S/S of Pre-Eclampsia were reviewed with the patient in detail. She is to report any of these if they occur. She currently denies any of these. The patient had her 28 week labs completed.   Admission on 2023, Discharged on 2023   Component Date Value Ref Range Status    WBC 2023 10.7  3.5 - 11.3 k/uL Final    RBC 2023 3.74 (A)  3.95 - 5.11 m/uL Final    Hemoglobin 2023 11.5 (A)  11.9 - 15.1 g/dL Final    Hematocrit 2023 33.7 (A)  36.3 - 47.1 % Final    MCV 2023 90.1  82.6 - 102.9 fL Final    MCH 2023 30.7  25.2 - 33.5 pg Final    MCHC 2023 34.1  28.4 - 34.8 g/dL Final    RDW 2023 12.3  11.8 - 14.4 % Final    Platelets  156  138 - 453 k/uL Final    MPV 2023 12.7  8.1 - 13.5 fL Final    NRBC Automated 2023 0.0  0.0 per 100 WBC Final    Seg Neutrophils 2023 72 (A)  36 - 65 % Final    Lymphocytes 2023 16 (A)  24 - 43 % Final    Monocytes 2023 9  3 - 12 % Final    Eosinophils % 2023 2  1 - 4 % Final    Basophils 2023 0  0 - 2 % Final    Immature Granulocytes 2023 1 (A)  0 % Final    Segs Absolute 2023 7.70  1.50 - 8.10 k/uL Final    Absolute Lymph # 2023 1.71  1.10 - 3.70 k/uL Final    Absolute Mono # 2023 1.00  0.10 - 1.20 k/uL Final    Absolute Eos # 2023 0.22  0.00 - 0.44 k/uL Final    Basophils Absolute 01/08/2023 0.04  0.00 - 0.20 k/uL Final    Absolute Immature Granulocyte 01/08/2023 0.07  0.00 - 0.30 k/uL Final    Glucose 01/08/2023 98  70 - 99 mg/dL Final    BUN 01/08/2023 11  6 - 20 mg/dL Final    Creatinine 01/08/2023 0.64  0.50 - 0.90 mg/dL Final    Est, Glom Filt Rate 01/08/2023 >60  >60 mL/min/1.73m2 Final    Comment:       Effective Oct 3, 2022        These results are not intended for use in patients <25years of age. eGFR results are calculated without a race factor using the 2021 CKD-EPI equation. Careful clinical correlation is recommended, particularly when comparing to results   calculated using previous equations. The CKD-EPI equation is less accurate in patients with extremes of muscle mass, extra-renal   metabolism of creatine, excessive creatine ingestion, or following therapy that affects   renal tubular secretion.  Calcium 01/08/2023 8.9  8.6 - 10.4 mg/dL Final    Sodium 01/08/2023 132 (A)  135 - 144 mmol/L Final    Potassium 01/08/2023 3.7  3.7 - 5.3 mmol/L Final    Chloride 01/08/2023 101  98 - 107 mmol/L Final    CO2 01/08/2023 21  20 - 31 mmol/L Final    Anion Gap 01/08/2023 10  9 - 17 mmol/L Final    Alkaline Phosphatase 01/08/2023 123 (A)  35 - 104 U/L Final    ALT 01/08/2023 14  5 - 33 U/L Final    AST 01/08/2023 19  <32 U/L Final    Total Bilirubin 01/08/2023 0.2 (A)  0.3 - 1.2 mg/dL Final    Total Protein 01/08/2023 6.4  6.4 - 8.3 g/dL Final    Albumin 01/08/2023 3.4 (A)  3.5 - 5.2 g/dL Final    Albumin/Globulin Ratio 01/08/2023 1.1  1.0 - 2.5 Final    Specimen Description 01/08/2023 . NASOPHARYNGEAL SWAB   Final    SARS-CoV-2, Rapid 01/08/2023 Not Detected  Not Detected Final    Comment:       Rapid NAAT:  The specimen is NEGATIVE for SARS-CoV-2, the novel coronavirus associated with   COVID-19.         The ID NOW COVID-19 assay is designed to detect the virus that causes COVID-19 in patients   with signs and symptoms of infection who are suspected of COVID-19. An individual without symptoms of COVID-19 and who is not shedding SARS-CoV-2 virus would   expect to have a negative (not detected) result in this assay. Negative results should be treated as presumptive and, if inconsistent with clinical signs   and symptoms or necessary for patient management,  should be tested with an alternative molecular assay. Negative results do not preclude   SARS-CoV-2 infection and   should not be used as the sole basis for patient management decisions. Fact sheet for Healthcare Providers: http://www.shauna.cristopher/  Fact sheet for Patients: http://www.shauna.cristopher/        Methodology: Isothermal Nucleic Acid Amplification      Flu A Antigen 01/08/2023 NEGATIVE  NEGATIVE Final    for Influenza A Antigen    Flu B Antigen 01/08/2023 NEGATIVE  NEGATIVE Final    for Influenza B Antigen.     Ventricular Rate 01/08/2023 95  BPM Final    Atrial Rate 01/08/2023 95  BPM Final    P-R Interval 01/08/2023 116  ms Final    QRS Duration 01/08/2023 80  ms Final    Q-T Interval 01/08/2023 354  ms Final    QTc Calculation (Bazett) 01/08/2023 444  ms Final    P Axis 01/08/2023 45  degrees Final    R Axis 01/08/2023 42  degrees Final    T Axis 01/08/2023 60  degrees Final    Magnesium 01/08/2023 1.8  1.6 - 2.6 mg/dL Final    Color, UA 01/08/2023 Yellow  Yellow Final    Turbidity UA 01/08/2023 Cloudy (A)  Clear Final    Glucose, Ur 01/08/2023 NEGATIVE  NEGATIVE Final    Bilirubin Urine 01/08/2023 NEGATIVE  NEGATIVE Final    Ketones, Urine 01/08/2023 NEGATIVE  NEGATIVE Final    Specific Gravity, UA 01/08/2023 1.010  1.005 - 1.030 Final    Urine Hgb 01/08/2023 SMALL (A)  NEGATIVE Final    pH, UA 01/08/2023 6.5  5.0 - 8.0 Final    Protein, UA 01/08/2023 TRACE (A)  NEGATIVE Final    Urobilinogen, Urine 01/08/2023 Normal  Normal Final    Nitrite, Urine 01/08/2023 NEGATIVE  NEGATIVE Final    Leukocyte Esterase, Urine 01/08/2023 NEGATIVE NEGATIVE Final    WBC, UA 01/08/2023 5 TO 10  0 - 5 /HPF Final    RBC, UA 01/08/2023 2 TO 5  0 - 4 /HPF Final    Reference range defined for non-centrifuged specimen.  Casts UA 01/08/2023 10 TO 20 HYALINE Reference range defined for non-centrifuged specimen. 0 - 8 /LPF Final    Epithelial Cells UA 01/08/2023 10 TO 20  0 - 5 /HPF Final    Bacteria, UA 01/08/2023 MODERATE (A)  None Final    Specimen Description 01/08/2023 . CLEAN CATCH URINE   Final    Culture 01/08/2023 NO SIGNIFICANT GROWTH   Final    D-Dimer, Quant 01/08/2023 3.66  mg/L FEU Final    Comment:        When combined with a low clinical probability, a D dimer value of <0.50 mg/L FEU is   considered negative for DVT and PE (negative predictive value of 98%, sensitivity of 97%). If this test is not being used to help rule out DVT and PE, then the following reference   range should be utilized: 0.00 - 1.02 mg/L FEU. The [employer] D-Dimer assay is intended for use as an aid in the diagnosis of venous   thromboembolism (DVT and PE) and the results should be interpreted in conjunction with the   patient's medical history, clinical presentation, and other findings. Elevated levels of D-dimer activity can be seen in any state of coagulation activation and   is not recommended in patients with therapeutic dose anticoagulant therapy for >24 hours,   fibrinolytic therapy within the previous 7 days, trauma or surgery within the previous 4   weeks,   disseminated malignancies, aortic aneurysm, sepsis, severe infections, pneumonia, severe   skin infections, liver cirrhosis, advance                           d age, coronary disease, diabetes, and pregnancy. A very low percentage of patients with DVT may yield D-dimer results below the cutoff of   0.5 mg/L FEU. This is known to be more prevalent in patients with distal DVT.            Hospital Outpatient Visit on 01/03/2023   Component Date Value Ref Range Status    Magnesium 01/03/2023 2.1  1.6 - 2.6 mg/dL Final    Glucose 01/03/2023 75  70 - 99 mg/dL Final    BUN 01/03/2023 11  6 - 20 mg/dL Final    Creatinine 01/03/2023 0.57  0.50 - 0.90 mg/dL Final    Est, Glom Filt Rate 01/03/2023 >60  >60 mL/min/1.73m2 Final    Comment:       Effective Oct 3, 2022        These results are not intended for use in patients <25years of age. eGFR results are calculated without a race factor using the 2021 CKD-EPI equation. Careful clinical correlation is recommended, particularly when comparing to results   calculated using previous equations. The CKD-EPI equation is less accurate in patients with extremes of muscle mass, extra-renal   metabolism of creatine, excessive creatine ingestion, or following therapy that affects   renal tubular secretion.       Calcium 01/03/2023 9.7  8.6 - 10.4 mg/dL Final    Sodium 01/03/2023 135  135 - 144 mmol/L Final    Potassium 01/03/2023 4.7  3.7 - 5.3 mmol/L Final    Chloride 01/03/2023 101  98 - 107 mmol/L Final    CO2 01/03/2023 21  20 - 31 mmol/L Final    Anion Gap 01/03/2023 13  9 - 17 mmol/L Final    WBC 01/03/2023 11.4 (A)  3.5 - 11.3 k/uL Final    RBC 01/03/2023 3.77 (A)  3.95 - 5.11 m/uL Final    Hemoglobin 01/03/2023 11.5 (A)  11.9 - 15.1 g/dL Final    Hematocrit 01/03/2023 34.7 (A)  36.3 - 47.1 % Final    MCV 01/03/2023 92.0  82.6 - 102.9 fL Final    MCH 01/03/2023 30.5  25.2 - 33.5 pg Final    MCHC 01/03/2023 33.1  28.4 - 34.8 g/dL Final    RDW 01/03/2023 12.4  11.8 - 14.4 % Final    Platelets 29/79/3271 169  138 - 453 k/uL Final    MPV 01/03/2023 12.4  8.1 - 13.5 fL Final    NRBC Automated 01/03/2023 0.0  0.0 per 100 WBC Final    TSH 01/03/2023 2.11  0.30 - 5.00 uIU/mL Final   Hospital Outpatient Visit on 12/21/2022   Component Date Value Ref Range Status    Color, UA 12/21/2022 Yellow  Yellow Final    Turbidity UA 12/21/2022 Cloudy (A)  Clear Final    Glucose, Ur 12/21/2022 NEGATIVE  NEGATIVE Final    Bilirubin Urine 12/21/2022 NEGATIVE NEGATIVE Final    Ketones, Urine 12/21/2022 NEGATIVE  NEGATIVE Final    Specific Gravity, UA 12/21/2022 1.009  1.005 - 1.030 Final    Urine Hgb 12/21/2022 NEGATIVE  NEGATIVE Final    pH, UA 12/21/2022 7.0  5.0 - 8.0 Final    Protein, UA 12/21/2022 NEGATIVE  NEGATIVE Final    Urobilinogen, Urine 12/21/2022 Normal  Normal Final    Nitrite, Urine 12/21/2022 NEGATIVE  NEGATIVE Final    Leukocyte Esterase, Urine 12/21/2022 NEGATIVE  NEGATIVE Final    Total Protein, Urine 12/21/2022 10  mg/dL Final    No normal range established.  Creatinine, Ur 12/21/2022 44.6  28.0 - 217.0 mg/dL Final    Urine Total Protein Creatinine Rat* 12/21/2022 0.22 (A)  0.00 - 0.20 Final    WBC, UA 12/21/2022 5 TO 10  0 - 5 /HPF Final    RBC, UA 12/21/2022 2 TO 5  0 - 2 /HPF Final    Casts UA 12/21/2022 None  0 - 2 /LPF Final    Epithelial Cells UA 12/21/2022 20 TO 50  0 - 5 /HPF Final    Bacteria, UA 12/21/2022 MANY (A)  None Final   Hospital Outpatient Visit on 12/13/2022   Component Date Value Ref Range Status    Glucose 12/13/2022 68 (A)  70 - 99 mg/dL Final    BUN 12/13/2022 14  6 - 20 mg/dL Final    Creatinine 12/13/2022 0.55  0.50 - 0.90 mg/dL Final    Est, Glom Filt Rate 12/13/2022 >60  >60 mL/min/1.73m2 Final    Comment:       Effective Oct 3, 2022        These results are not intended for use in patients <25years of age. eGFR results are calculated without a race factor using the 2021 CKD-EPI equation. Careful clinical correlation is recommended, particularly when comparing to results   calculated using previous equations. The CKD-EPI equation is less accurate in patients with extremes of muscle mass, extra-renal   metabolism of creatine, excessive creatine ingestion, or following therapy that affects   renal tubular secretion.       Calcium 12/13/2022 9.4  8.6 - 10.4 mg/dL Final    Sodium 12/13/2022 135  135 - 144 mmol/L Final    Potassium 12/13/2022 4.4  3.7 - 5.3 mmol/L Final    Chloride 12/13/2022 102  98 - 107 mmol/L Final    CO2 2022 22  20 - 31 mmol/L Final    Anion Gap 2022 11  9 - 17 mmol/L Final    Alkaline Phosphatase 2022 79  35 - 104 U/L Final    ALT 2022 15  5 - 33 U/L Final    AST 2022 20  <32 U/L Final    Total Bilirubin 2022 0.2 (A)  0.3 - 1.2 mg/dL Final    Total Protein 2022 6.4  6.4 - 8.3 g/dL Final    Albumin 2022 3.6  3.5 - 5.2 g/dL Final    Albumin/Globulin Ratio 2022 1.3  1.0 - 2.5 Final    WBC 2022 12.2 (A)  3.5 - 11.3 k/uL Final    RBC 2022 3.61 (A)  3.95 - 5.11 m/uL Final    Hemoglobin 2022 11.1 (A)  11.9 - 15.1 g/dL Final    Hematocrit 2022 32.5 (A)  36.3 - 47.1 % Final    MCV 2022 90.0  82.6 - 102.9 fL Final    MCH 2022 30.7  25.2 - 33.5 pg Final    MCHC 2022 34.2  28.4 - 34.8 g/dL Final    RDW 2022 12.1  11.8 - 14.4 % Final    Platelets  205  138 - 453 k/uL Final    MPV 2022 12.0  8.1 - 13.5 fL Final    NRBC Automated 2022 0.0  0.0 per 100 WBC Final   ]    Insurance [facility]     DOES NOT WANT TO KNOW GENDER OF BABIES baby A on left baby B on right    G1P    Plans to deliver: [facility]    FOB dorian    1st trimester screen/NIPs:     AFP- NEG    Fetal Echo    High Risk:  Di/Di Twins  IVF  FGR fetus A   testing to be done at 32 weeks- dopplers/BPP at [facility], NST here in office- all set up- NST twice weekly recommended    Early 1 hr GTT:    Covid Vaccine: Yes Lendon Fu and booster     Flu Vaccine: given 22    Tdap: given 1/3/23    Rhogam: n/a, A+    1hr GTT: passed 102 on     3hr GTT:    GBS:    2nd trimester appointment with Dr. Shepard Null :     3rd trimester appointment with Dr. Shepard Null :     [facility]       T-Dap Vaccine (27-36 weeks): completed    The patient was instructed on fetal kick counts and was given a kick sheet to complete every 8 hours.  She was instructed that the baby should move at a minimum of ten times within one hour after a meal. The patient was instructed to lay down on her left side twenty minutes after eating and count movements for up to one hour with a target value of ten movements. She was instructed to notify the office if she did not make that target after two attempts or if after any attempt there was less than four movements. The patient reports that the targets have been made Yes.  Testing:  Yes, Di-Di Twin    REACTIVE NST  CATEGORY 1 TRACING  Moderate Variability  Baseline of 130/135 bpm  SEE SCANNED DOCUMENT  RTO 2-5 DAYS FOR A FOLLOW UP APPOINTMENT WITH  TESTING. Assessment:  1. Marilu Boyer is a 29 y.o. female  2.   3. 33w4d    Patient Active Problem List    Diagnosis Date Noted    33 weeks gestation of pregnancy 2023     Priority: Medium    Needs flu shot 2022     Priority: Medium    High risk pregnancy, antepartum 2022     Priority: Medium    IVF pregnancy 2022     Priority: Greta Werner in [address] diamniotic twin pregnancy  2022     Priority: Medium    Female infertility 2021     Priority: Medium    FGR Baby A and B  2023     Baby A ( AC <10th%)  Baby B ( AC<10th%)      Recurrent candidiasis of vagina 2019    Gastroesophageal reflux disease 2019    Acute recurrent maxillary sinusitis 2016    Seasonal allergic rhinitis 2016    Anxiety and depression 2012     No meds currently      Environmental allergies         Diagnosis Orders   1. 33 weeks gestation of pregnancy  MS FETAL NONSTRESS TEST      2. Dichorionic diamniotic twin pregnancy in third trimester  MS FETAL NONSTRESS TEST      3. Pregnancy affected by fetal growth restriction  MS FETAL NONSTRESS TEST      4. Microcephaly, fetal, affecting care of mother, antepartum, not applicable or unspecified fetus  MS FETAL NONSTRESS TEST      5.  High risk pregnancy, antepartum [place] FETAL NONSTRESS TEST                Plan:  The patient will return to the office for her next visit in 1 weeks. See antepartum flow sheet. Counseled on s/s of preeclampsia. Counseled on s/s of  labor. Counseled on fetal movement  Cephalic/cephalic at this time. Discussed mode of delivery including R/B/A of each and possibility of emergency C/S and baby B flipping to breech. She understands and would like trial of labor if possible and safe     Testing Indicated: Yes  Scheduled with Nursing-Pt notified: Yes      Patient was seen with total face to face time of 20 minutes. More than 50% of this visit was on counseling and education regarding her    Diagnosis Orders   1. 33 weeks gestation of pregnancy  AK FETAL NONSTRESS TEST      2. Dichorionic diamniotic twin pregnancy in third trimester  AK FETAL NONSTRESS TEST      3. Pregnancy affected by fetal growth restriction  AK FETAL NONSTRESS TEST      4. Microcephaly, fetal, affecting care of mother, antepartum, not applicable or unspecified fetus  AK FETAL NONSTRESS TEST      5. High risk pregnancy, antepartum  AK FETAL NONSTRESS TEST       and her options. She was also counseled on her preventative health maintenance recommendations and follow-up.

## 2023-01-12 ENCOUNTER — ROUTINE PRENATAL (OUTPATIENT)
Dept: PERINATAL CARE | Age: 33
End: 2023-01-12
Payer: OTHER GOVERNMENT

## 2023-01-12 VITALS
WEIGHT: 165 LBS | DIASTOLIC BLOOD PRESSURE: 76 MMHG | HEIGHT: 66 IN | HEART RATE: 88 BPM | RESPIRATION RATE: 16 BRPM | TEMPERATURE: 97.2 F | SYSTOLIC BLOOD PRESSURE: 116 MMHG | BODY MASS INDEX: 26.52 KG/M2

## 2023-01-12 DIAGNOSIS — O36.5932 POOR FETAL GROWTH AFFECTING MANAGEMENT OF MOTHER IN THIRD TRIMESTER, FETUS 2 OF MULTIPLE GESTATION: ICD-10-CM

## 2023-01-12 DIAGNOSIS — O36.5931 POOR FETAL GROWTH AFFECTING MANAGEMENT OF MOTHER IN THIRD TRIMESTER, FETUS 1 OF MULTIPLE GESTATION: ICD-10-CM

## 2023-01-12 DIAGNOSIS — O43.123 VELAMENTOUS INSERTION OF UMBILICAL CORD IN THIRD TRIMESTER: ICD-10-CM

## 2023-01-12 DIAGNOSIS — O30.043 DICHORIONIC DIAMNIOTIC TWIN PREGNANCY IN THIRD TRIMESTER: Primary | ICD-10-CM

## 2023-01-12 DIAGNOSIS — Z3A.33 33 WEEKS GESTATION OF PREGNANCY: ICD-10-CM

## 2023-01-12 DIAGNOSIS — O09.813 PREGNANCY RESULTING FROM IN VITRO FERTILIZATION IN THIRD TRIMESTER: ICD-10-CM

## 2023-01-12 PROCEDURE — 76820 UMBILICAL ARTERY ECHO: CPT | Performed by: OBSTETRICS & GYNECOLOGY

## 2023-01-12 PROCEDURE — 76815 OB US LIMITED FETUS(S): CPT | Performed by: OBSTETRICS & GYNECOLOGY

## 2023-01-12 PROCEDURE — 76821 MIDDLE CEREBRAL ARTERY ECHO: CPT | Performed by: OBSTETRICS & GYNECOLOGY

## 2023-01-12 PROCEDURE — 76819 FETAL BIOPHYS PROFIL W/O NST: CPT | Performed by: OBSTETRICS & GYNECOLOGY

## 2023-01-16 ENCOUNTER — ROUTINE PRENATAL (OUTPATIENT)
Dept: OBGYN CLINIC | Age: 33
End: 2023-01-16
Payer: OTHER GOVERNMENT

## 2023-01-16 VITALS
HEART RATE: 84 BPM | DIASTOLIC BLOOD PRESSURE: 76 MMHG | SYSTOLIC BLOOD PRESSURE: 120 MMHG | WEIGHT: 166 LBS | BODY MASS INDEX: 26.79 KG/M2

## 2023-01-16 DIAGNOSIS — O30.043 DICHORIONIC DIAMNIOTIC TWIN PREGNANCY IN THIRD TRIMESTER: ICD-10-CM

## 2023-01-16 DIAGNOSIS — L29.9 PRURITUS OF PREGNANCY IN THIRD TRIMESTER: ICD-10-CM

## 2023-01-16 DIAGNOSIS — O09.813 PREGNANCY RESULTING FROM IN VITRO FERTILIZATION, THIRD TRIMESTER: ICD-10-CM

## 2023-01-16 DIAGNOSIS — O99.713 PRURITUS OF PREGNANCY IN THIRD TRIMESTER: ICD-10-CM

## 2023-01-16 DIAGNOSIS — O43.123 VELAMENTOUS INSERTION OF UMBILICAL CORD IN THIRD TRIMESTER: ICD-10-CM

## 2023-01-16 DIAGNOSIS — Z3A.34 34 WEEKS GESTATION OF PREGNANCY: ICD-10-CM

## 2023-01-16 DIAGNOSIS — O36.5990 PREGNANCY AFFECTED BY FETAL GROWTH RESTRICTION: ICD-10-CM

## 2023-01-16 DIAGNOSIS — O35.07X0 MICROCEPHALY, FETAL, AFFECTING CARE OF MOTHER, ANTEPARTUM, NOT APPLICABLE OR UNSPECIFIED FETUS: ICD-10-CM

## 2023-01-16 DIAGNOSIS — O09.90 HIGH RISK PREGNANCY, ANTEPARTUM: Primary | ICD-10-CM

## 2023-01-16 PROCEDURE — 0502F SUBSEQUENT PRENATAL CARE: CPT | Performed by: NURSE PRACTITIONER

## 2023-01-16 PROCEDURE — 59025 FETAL NON-STRESS TEST: CPT | Performed by: NURSE PRACTITIONER

## 2023-01-16 NOTE — PATIENT INSTRUCTIONS
After Hours Number: You can call the office [phone]  or [phone]  Call if you have:  1. Bleeding like a period  2. Cramps or contractions greater than 2 hours  3. If you are leaking fluid  4. If you've a fever greater than 100°  5. If you feel as if baby is not moving  6. If you have continuous vomiting over 3-4 hours   Counting Your Baby's Kicks: Care Instructions  Your Care Instructions    Counting your baby's kicks is one way your doctor can tell that your baby is healthy. Most women--especially in a first pregnancy--feel their baby move for the first time between 16 and 22 weeks. The movement may feel like flutters rather than kicks. Your baby may move more at certain times of the day. When you are active, you may notice less kicking than when you are resting. At your prenatal visits, your doctor will ask whether the baby is active. In your last trimester, your doctor may ask you to count the number of times you feel your baby move. Follow-up care is a key part of your treatment and safety. Be sure to make and go to all appointments, and call your doctor if you are having problems. It's also a good idea to know your test results and keep a list of the medicines you take. How do you count fetal kicks? A common method of checking your baby's movement is to count the number of kicks or moves you feel in 1 hour. Ten movements (such as kicks, flutters, or rolls) in 1 hour are normal. Some doctors suggest that you count in the morning until you get to 10 movements. Then you can quit for that day and start again the next day. Pick your baby's most active time of day to count. This may be any time from morning to evening. If you do not feel 10 movements in an hour, your baby may be sleeping. Wait for the next hour and count again. When should you call for help?   Call your doctor now or seek immediate medical care if:    You noticed that your baby has stopped moving or is moving much less than normal.    Watch closely for changes in your health, and be sure to contact your doctor if you have any problems. Where can you learn more? Go to https://chpepiceweb.[employer]. org and sign in to your [email or web address] account. Enter O113 in the [employer] box to learn more about \"Counting Your Baby's Kicks: Care Instructions. \"     If you do not have an account, please click on the \"Sign Up Now\" link. Current as of: September 5, 2018  Content Version: 12.0  © 7672-8717 [employer]. Care instructions adapted under license by [facility] ([facility]). If you have questions about a medical condition or this instruction, always ask your healthcare professional. [address] any warranty or liability for your use of this information. Preeclampsia: Care Instructions  Overview    Preeclampsia occurs when a woman's blood pressure rises during pregnancy. Often with preeclampsia, you also have swelling in your legs, hands, and face. A test may show too much protein in your urine. Preeclampsia is also called toxemia. If preeclampsia is severe and not treated, it can lead to seizures (eclampsia) and damage to your liver or kidneys. Preeclampsia can prevent your baby from getting enough food and oxygen. This can cause a low birth weight or other problems. Your doctor will watch you closely to prevent these problems. He or she also may recommend that you reduce your activity. If your preeclampsia is a danger to your health or the health of your baby, your doctor may need to deliver your baby early. While preeclampsia is a concern, most women with preeclampsia have healthy babies. After a woman gives birth, preeclampsia usually goes away on its own. But symptoms may last a few weeks or more and can get worse after delivery. Rarely, symptoms of preeclampsia don't show up until days or even weeks after childbirth. Follow-up care is a key part of your treatment and safety.  Be sure to make and go to all appointments, and call your doctor if you are having problems. It's also a good idea to know your test results and keep a list of the medicines you take. How can you care for yourself at home? Take and record your blood pressure at home if your doctor tells you to. Learn the importance of the two measures of blood pressure (such as 120 over 80, or 120/80). The first number is the systolic pressure, which is the force of blood on the artery walls as the heart pumps. The second number is the diastolic pressure, which is the force of blood on the artery walls between heartbeats, when the heart is at rest. You have a choice of monitors to use. Manual monitor: You pump up the cuff and use a stethoscope to listen for your pulse. Electronic monitor: The cuff inflates, and a gauge shows your pulse rate. To take your blood pressure:  Ask your doctor to check your blood pressure monitor to be sure that it is accurate and that the cuff fits you. Also ask your doctor to watch you to make sure that you are using it right. You should not eat, use tobacco products, or use medicine known to raise blood pressure (such as some nasal decongestant sprays) before you take your blood pressure. Avoid taking your blood pressure if you have just exercised. Also avoid taking it if you are nervous or upset. Rest at least 15 minutes before you take your blood pressure. If your doctor advises, check the protein levels in your urine. Your doctor or nurse will show you how to do this. Take your medicines exactly as prescribed. Call your doctor if you think you are having a problem with your medicine. Do not smoke. Quitting smoking will help improve your baby's growth and health. If you need help quitting, talk to your doctor about stop-smoking programs and medicines. These can increase your chances of quitting for good. Eat a balanced and healthy diet that has lots of fruits and vegetables.   If your doctor advised bed rest, be sure to stay off your feet and rest as much as possible. Keep a phone, phone book, notepad, and pen near the bed where you can easily reach them. Gently stretch your legs every hour to maintain good blood flow. Have another family member pack snacks and lunch food in a cooler close to your bed. Use this time for activities that you usually cannot find time for, such as reading, craft projects, or letter writing. You can keep track of your baby's health by noting the length of time it takes to count 10 movements (such as kicks, flutters, or rolls). Feeling 10 movements in less than 1 hour is considered normal. Track your baby's movements once each day. Bring this record with you to each prenatal visit. When should you call for help? Call 911 anytime you think you may need emergency care. For example, call if:    You passed out (lost consciousness). You have a seizure. Call your doctor now or seek immediate medical care if:    You have symptoms of preeclampsia, such as:  Sudden swelling of your face, hands, or feet. New vision problems (such as dimness or blurring). A severe headache. Your blood pressure is higher than it should be, or it rises suddenly. You have new nausea or vomiting. You think that you are in labor. You have pain in your belly or pelvis. Watch closely for changes in your health, and be sure to contact your doctor if:    You gain weight rapidly. Where can you learn more? Go to https://deviantARTamandoSepSensor.[employer]. org and sign in to your 1-800-DENTIST account. Enter J687 in the [facility] box to learn more about \"Preeclampsia: Care Instructions. \"     If you do not have an account, please click on the \"Sign Up Now\" link. Current as of: September 5, 2018  Content Version: 12.0  © 7530-0594 Healthwise[employer]. Care instructions adapted under license by [facility] ([facility]).  If you have questions about a medical condition or this instruction, always ask your healthcare professional. [place] [address] any warranty or liability for your use of this information.  Labor: Care Instructions  Your Care Instructions     labor is the start of labor between 21 and 36 weeks of pregnancy. A full-term pregnancy lasts 37 to 42 weeks. In labor, the uterus contracts to open the cervix. This is the first stage of childbirth.  labor can be caused by a problem with the baby, the mother, or both. Often the cause is not known. In some cases, doctors use medicines to try to delay labor until 29 or more weeks of pregnancy. By this time, a baby has grown enough so that problems are not likely. In some cases--such as with a serious infection--it is healthier for the baby to be born early. Your treatment will depend on how far along you are in your pregnancy and on your health and your baby's health. Follow-up care is a key part of your treatment and safety. Be sure to make and go to all appointments, and call your doctor if you are having problems. It's also a good idea to know your test results and keep a list of the medicines you take. How can you care for yourself at home? If your doctor prescribed medicines, take them exactly as directed. Call your doctor if you think you are having a problem with your medicine. Rest until your doctor advises you about activity. He or she will tell you if you should stay in bed most of the time. You may need to arrange for  if you have young children. Do not have sexual intercourse unless your doctor says it is safe. Use pads, not tampons, if you have vaginal bleeding. Make sure to drink plenty of fluids. Dehydration can lead to contractions. If you have kidney, heart, or liver disease and have to limit fluids, talk with your doctor before you increase the amount of fluids you drink. Do not smoke or allow others to smoke around you.  If you need help quitting, talk to your doctor about stop-smoking programs and medicines. These can increase your chances of quitting for good.  When should you call for help?  Call 911 anytime you think you may need emergency care. For example, call if:    You passed out (lost consciousness).     You have severe vaginal bleeding.     You have severe pain in your belly or pelvis.     You have had fluid gushing or leaking from your vagina and you know or think the umbilical cord is bulging into your vagina. If this happens, immediately get down on your knees so your rear end (buttocks) is higher than your head. This will decrease the pressure on the cord until help arrives.    Call your doctor now or seek immediate medical care if:    You have signs of preeclampsia, such as:  Sudden swelling of your face, hands, or feet.  New vision problems (such as dimness or blurring).  A severe headache.     You have any vaginal bleeding.     You have belly pain or cramping.     You have a fever.     You have had regular contractions (with or without pain) for an hour. This means that you have 6 or more within 1 hour after you change your position and drink fluids.     You have a sudden release of fluid from the vagina.     You have low back pain or pelvic pressure that does not go away.     You notice that your baby has stopped moving or is moving much less than normal.    Watch closely for changes in your health, and be sure to contact your doctor if you have any problems.  Where can you learn more?  Go to https://chjuan.[employer].org and sign in to your [employer] account. Enter Q400 in the Search Health Information box to learn more about \" Labor: Care Instructions.\"     If you do not have an account, please click on the \"Sign Up Now\" link.  Current as of: 2018  Content Version: 12.0  © 3448-3802 Healthwise[employer]. Care instructions adapted under license by [employer]. If you have questions about a medical condition or this instruction,  always ask your healthcare professional. [place] [address] any warranty or liability for your use of this information.

## 2023-01-16 NOTE — PROGRESS NOTES
S:  Here for NST for fetal surveillance secondary to high risk IVF twin pregnancy- FGR fetus A &B,  microcephaly of fetus. Headaches: no  Swelling: no  Bleeding: no  Discharge: no   Dysuria: no  Nausea: no  Heartburn: no  Epigastric pain: no  Contractions: no  Leakage of fluid: no  + fetal movement      She states had noted in December episode of itching to her axilla and then went away and then this past week noted similar itching to axilla and went away then had one palm that itched but then went away. Denies current itching to palms of hands or soles of feet, Denies increased itching at night, RUQ abdominal pain, or nausea/poor appetite. Insurance [facility]     DOES NOT WANT TO KNOW GENDER OF BABIES baby A on left baby B on right    G1P    Plans to deliver: [facility]    FOB dorian    1st trimester screen/NIPs:     AFP- NEG    Fetal Echo    High Risk:  Di/Di Twins  IVF  FGR fetus A & B   testing to be done at 32 weeks- dopplers/BPP at [facility], NST here in office- all set up- NST twice weekly recommended    Early 1 hr GTT:    Covid Vaccine: Yes Clemencia Pedlar and booster     Flu Vaccine: given 22    Tdap: given 1/3/23    Rhogam: n/a, A+    1hr GTT: passed 102 on     3hr GTT:    GBS:    2nd trimester appointment with Dr. Levi Alarcon :     3rd trimester appointment with Dr. Levi Alarcon :     [facility]       O:  Talia Raquel:    23 1313   BP: 120/76   Pulse: 84   Weight: 166 lb (75.3 kg)      Fetus A  Baseline:  125  Variability:  Moderate  Accelerations:  Present  Decelerations:  Absent  Fetal Movement:  Perceived by patient during NST  Contractions:  rare    Fetus B  Baseline:  135  Variability:  Moderate  Accelerations:  Present  Decelerations:  Absent  Fetal Movement:  Perceived by patient during NST  Contractions:  rare                      A/P:    1. High risk pregnancy, antepartum  rNST, BPP dopplers at [facility] weekly   - [place] FETAL NONSTRESS TEST    2.  Dichorionic diamniotic twin pregnancy in third trimester  rNST, BPP dopplers at [facility] weekly   - [place] FETAL NONSTRESS TEST    3. 34 weeks gestation of pregnancy  rNST, BPP dopplers at [facility] weekly   - [place] FETAL NONSTRESS TEST  - Bile Acids, Total; Future  - Comprehensive Metabolic Panel; Future    4. Pregnancy affected by fetal growth restriction  rNST, BPP dopplers at [facility] weekly     - [place] FETAL NONSTRESS TEST    5. Microcephaly, fetal, affecting care of mother, antepartum, not applicable or unspecified fetus  rNST, BPP dopplers at [facility] weekly     - [place] FETAL NONSTRESS TEST    6. Pruritus of pregnancy in third trimester  She has c/o itching as above, discussed,  ICP is a clinical diagnosis and patient with intractable itching to palms of hands and soles of feet at term,  worsening full body pruritis with excoriations  At this point recommend topical hydrocortisone if itching returns and let us know if helps. Check labs bile acids and CMP, and monitor closely and notify us if itching to palms of hands or soles of feet,increased itching at night, RUQ abdominal pain, or nausea/poor appetite. - Bile Acids, Total; Future  - Comprehensive Metabolic Panel; Future    7. Velamentous insertion of umbilical cord in third trimester      8. Pregnancy resulting from in vitro fertilization, third trimester        Reactive NST    Keep scheduled fetal surveillance appointment  Continue Fetal Kick Counts     Of the 30 minute duration appointment visit, Nicole Keenan CNP spent at least 50% of the face-to-face time in counseling, explanation of diagnosis, planning of further management, and answering all questions.

## 2023-01-17 ENCOUNTER — HOSPITAL ENCOUNTER (OUTPATIENT)
Age: 33
Setting detail: SPECIMEN
Discharge: HOME | End: 2023-01-17

## 2023-01-17 DIAGNOSIS — L29.9 PRURITUS OF PREGNANCY IN THIRD TRIMESTER: ICD-10-CM

## 2023-01-17 DIAGNOSIS — Z3A.34 34 WEEKS GESTATION OF PREGNANCY: ICD-10-CM

## 2023-01-17 DIAGNOSIS — O99.713 PRURITUS OF PREGNANCY IN THIRD TRIMESTER: ICD-10-CM

## 2023-01-17 LAB
ALBUMIN SERPL-MCNC: 3.3 G/DL (ref 3.5–5.2)
ALBUMIN/GLOBULIN RATIO: 1.1 (ref 1–2.5)
ALP BLD-CCNC: 130 U/L (ref 35–104)
ALT SERPL-CCNC: 13 U/L (ref 5–33)
ANION GAP SERPL CALCULATED.3IONS-SCNC: 13 MMOL/L (ref 9–17)
AST SERPL-CCNC: 21 U/L
BILIRUB SERPL-MCNC: 0.3 MG/DL (ref 0.3–1.2)
BUN BLDV-MCNC: 10 MG/DL (ref 6–20)
CALCIUM SERPL-MCNC: 8.9 MG/DL (ref 8.6–10.4)
CHLORIDE BLD-SCNC: 104 MMOL/L (ref 98–107)
CO2: 20 MMOL/L (ref 20–31)
CREAT SERPL-MCNC: 0.63 MG/DL (ref 0.5–0.9)
GFR SERPL CREATININE-BSD FRML MDRD: >60 ML/MIN/1.73M2
GLUCOSE BLD-MCNC: 61 MG/DL (ref 70–99)
POTASSIUM SERPL-SCNC: 4.7 MMOL/L (ref 3.7–5.3)
SODIUM BLD-SCNC: 137 MMOL/L (ref 135–144)
TOTAL PROTEIN: 6.3 G/DL (ref 6.4–8.3)

## 2023-01-18 ENCOUNTER — ROUTINE PRENATAL (OUTPATIENT)
Dept: OBGYN CLINIC | Age: 33
End: 2023-01-18
Payer: OTHER GOVERNMENT

## 2023-01-18 VITALS — DIASTOLIC BLOOD PRESSURE: 70 MMHG | SYSTOLIC BLOOD PRESSURE: 120 MMHG

## 2023-01-18 DIAGNOSIS — O30.043 DICHORIONIC DIAMNIOTIC TWIN PREGNANCY IN THIRD TRIMESTER: ICD-10-CM

## 2023-01-18 DIAGNOSIS — Z3A.34 34 WEEKS GESTATION OF PREGNANCY: ICD-10-CM

## 2023-01-18 DIAGNOSIS — O09.90 HIGH RISK PREGNANCY, ANTEPARTUM: Primary | ICD-10-CM

## 2023-01-18 LAB — BILE ACIDS TOTAL: 11 UMOL/L (ref 0–10)

## 2023-01-18 PROCEDURE — 96372 THER/PROPH/DIAG INJ SC/IM: CPT | Performed by: ADVANCED PRACTICE MIDWIFE

## 2023-01-18 RX ORDER — BETAMETHASONE SODIUM PHOSPHATE AND BETAMETHASONE ACETATE 3; 3 MG/ML; MG/ML
12 INJECTION, SUSPENSION INTRA-ARTICULAR; INTRALESIONAL; INTRAMUSCULAR; SOFT TISSUE ONCE
Status: COMPLETED | OUTPATIENT
Start: 2023-01-18 | End: 2023-01-18

## 2023-01-18 RX ADMIN — BETAMETHASONE SODIUM PHOSPHATE AND BETAMETHASONE ACETATE 12 MG: 3; 3 INJECTION, SUSPENSION INTRA-ARTICULAR; INTRALESIONAL; INTRAMUSCULAR; SOFT TISSUE at 13:08

## 2023-01-18 NOTE — PROGRESS NOTES
First steroid injection given- RUOQ, tolerated well. Twin pregnancy  34 weeks    Will be back tomorrow in 24 hours for second dose of injection.

## 2023-01-19 ENCOUNTER — ROUTINE PRENATAL (OUTPATIENT)
Dept: PERINATAL CARE | Age: 33
End: 2023-01-19
Payer: OTHER GOVERNMENT

## 2023-01-19 ENCOUNTER — ROUTINE PRENATAL (OUTPATIENT)
Dept: OBGYN CLINIC | Age: 33
End: 2023-01-19

## 2023-01-19 VITALS
HEIGHT: 66 IN | SYSTOLIC BLOOD PRESSURE: 125 MMHG | BODY MASS INDEX: 27.16 KG/M2 | RESPIRATION RATE: 16 BRPM | TEMPERATURE: 98.1 F | WEIGHT: 169 LBS | HEART RATE: 84 BPM | DIASTOLIC BLOOD PRESSURE: 77 MMHG

## 2023-01-19 VITALS — WEIGHT: 169 LBS | BODY MASS INDEX: 27.28 KG/M2

## 2023-01-19 DIAGNOSIS — O36.5931 POOR FETAL GROWTH AFFECTING MANAGEMENT OF MOTHER IN THIRD TRIMESTER, FETUS 1 OF MULTIPLE GESTATION: ICD-10-CM

## 2023-01-19 DIAGNOSIS — O36.5932 POOR FETAL GROWTH AFFECTING MANAGEMENT OF MOTHER IN THIRD TRIMESTER, FETUS 2 OF MULTIPLE GESTATION: ICD-10-CM

## 2023-01-19 DIAGNOSIS — O26.613 INTRAHEPATIC CHOLESTASIS OF PREGNANCY IN THIRD TRIMESTER: ICD-10-CM

## 2023-01-19 DIAGNOSIS — O35.07X0 MICROCEPHALY, FETAL, AFFECTING CARE OF MOTHER, ANTEPARTUM, NOT APPLICABLE OR UNSPECIFIED FETUS: ICD-10-CM

## 2023-01-19 DIAGNOSIS — O09.813 PREGNANCY RESULTING FROM IN VITRO FERTILIZATION IN THIRD TRIMESTER: ICD-10-CM

## 2023-01-19 DIAGNOSIS — O43.123 VELAMENTOUS INSERTION OF UMBILICAL CORD IN THIRD TRIMESTER: ICD-10-CM

## 2023-01-19 DIAGNOSIS — K83.1 INTRAHEPATIC CHOLESTASIS OF PREGNANCY IN THIRD TRIMESTER: ICD-10-CM

## 2023-01-19 DIAGNOSIS — O09.90 HIGH RISK PREGNANCY, ANTEPARTUM: ICD-10-CM

## 2023-01-19 DIAGNOSIS — O09.813 PREGNANCY RESULTING FROM IN VITRO FERTILIZATION, THIRD TRIMESTER: Primary | ICD-10-CM

## 2023-01-19 DIAGNOSIS — O30.043 DICHORIONIC DIAMNIOTIC TWIN PREGNANCY IN THIRD TRIMESTER: ICD-10-CM

## 2023-01-19 DIAGNOSIS — O30.043 DICHORIONIC DIAMNIOTIC TWIN PREGNANCY IN THIRD TRIMESTER: Primary | ICD-10-CM

## 2023-01-19 DIAGNOSIS — Z3A.34 34 WEEKS GESTATION OF PREGNANCY: ICD-10-CM

## 2023-01-19 DIAGNOSIS — O36.5990 PREGNANCY AFFECTED BY FETAL GROWTH RESTRICTION: ICD-10-CM

## 2023-01-19 PROBLEM — O26.643 INTRAHEPATIC CHOLESTASIS OF PREGNANCY IN THIRD TRIMESTER: Status: ACTIVE | Noted: 2023-01-19

## 2023-01-19 PROCEDURE — 76820 UMBILICAL ARTERY ECHO: CPT | Performed by: OBSTETRICS & GYNECOLOGY

## 2023-01-19 PROCEDURE — 99999 PR OFFICE/OUTPT VISIT,PROCEDURE ONLY: CPT | Performed by: OBSTETRICS & GYNECOLOGY

## 2023-01-19 PROCEDURE — 76819 FETAL BIOPHYS PROFIL W/O NST: CPT | Performed by: OBSTETRICS & GYNECOLOGY

## 2023-01-19 PROCEDURE — 76821 MIDDLE CEREBRAL ARTERY ECHO: CPT | Performed by: OBSTETRICS & GYNECOLOGY

## 2023-01-19 PROCEDURE — 76815 OB US LIMITED FETUS(S): CPT | Performed by: OBSTETRICS & GYNECOLOGY

## 2023-01-19 RX ORDER — URSODIOL 300 MG/1
300 CAPSULE ORAL
Qty: 90 CAPSULE | Refills: 1 | Status: SHIPPED | OUTPATIENT
Start: 2023-01-19

## 2023-01-19 RX ORDER — BETAMETHASONE SODIUM PHOSPHATE AND BETAMETHASONE ACETATE 3; 3 MG/ML; MG/ML
12 INJECTION, SUSPENSION INTRA-ARTICULAR; INTRALESIONAL; INTRAMUSCULAR; SOFT TISSUE ONCE
Status: COMPLETED | OUTPATIENT
Start: 2023-01-19 | End: 2023-01-19

## 2023-01-19 RX ADMIN — BETAMETHASONE SODIUM PHOSPHATE AND BETAMETHASONE ACETATE 12 MG: 3; 3 INJECTION, SUSPENSION INTRA-ARTICULAR; INTRALESIONAL; INTRAMUSCULAR; SOFT TISSUE at 14:21

## 2023-01-19 NOTE — PROGRESS NOTES
Please refer to attached ultrasound report for doctor's evaluation of the clinical information obtained by vital signs, ultrasound, and/or non-stress test along with management recommendation.

## 2023-01-19 NOTE — PATIENT INSTRUCTIONS
After Hours Number: You can call the office [phone]  or [phone]  Call if you have:  1. Bleeding like a period  2. Cramps or contractions greater than 2 hours  3. If you are leaking fluid  4. If you've a fever greater than 100°  5. If you feel as if baby is not moving  6. If you have continuous vomiting over 3-4 hours   Counting Your Baby's Kicks: Care Instructions  Your Care Instructions    Counting your baby's kicks is one way your doctor can tell that your baby is healthy. Most women--especially in a first pregnancy--feel their baby move for the first time between 16 and 22 weeks. The movement may feel like flutters rather than kicks. Your baby may move more at certain times of the day. When you are active, you may notice less kicking than when you are resting. At your prenatal visits, your doctor will ask whether the baby is active. In your last trimester, your doctor may ask you to count the number of times you feel your baby move. Follow-up care is a key part of your treatment and safety. Be sure to make and go to all appointments, and call your doctor if you are having problems. It's also a good idea to know your test results and keep a list of the medicines you take. How do you count fetal kicks? A common method of checking your baby's movement is to count the number of kicks or moves you feel in 1 hour. Ten movements (such as kicks, flutters, or rolls) in 1 hour are normal. Some doctors suggest that you count in the morning until you get to 10 movements. Then you can quit for that day and start again the next day. Pick your baby's most active time of day to count. This may be any time from morning to evening. If you do not feel 10 movements in an hour, your baby may be sleeping. Wait for the next hour and count again. When should you call for help?   Call your doctor now or seek immediate medical care if:    You noticed that your baby has stopped moving or is moving much less than normal.    Watch closely for changes in your health, and be sure to contact your doctor if you have any problems. Where can you learn more? Go to https://chpepiceweb.Workboard. org and sign in to your Evocalize account. Enter X490 in the [address] box to learn more about \"Counting Your Baby's Kicks: Care Instructions. \"     If you do not have an account, please click on the \"Sign Up Now\" link. Current as of: September 5, 2018  Content Version: 12.0  © 8727-0107 [employer]. Care instructions adapted under license by [facility] ([facility]). If you have questions about a medical condition or this instruction, always ask your healthcare professional. [address] any warranty or liability for your use of this information. Preeclampsia: Care Instructions  Overview    Preeclampsia occurs when a woman's blood pressure rises during pregnancy. Often with preeclampsia, you also have swelling in your legs, hands, and face. A test may show too much protein in your urine. Preeclampsia is also called toxemia. If preeclampsia is severe and not treated, it can lead to seizures (eclampsia) and damage to your liver or kidneys. Preeclampsia can prevent your baby from getting enough food and oxygen. This can cause a low birth weight or other problems. Your doctor will watch you closely to prevent these problems. He or she also may recommend that you reduce your activity. If your preeclampsia is a danger to your health or the health of your baby, your doctor may need to deliver your baby early. While preeclampsia is a concern, most women with preeclampsia have healthy babies. After a woman gives birth, preeclampsia usually goes away on its own. But symptoms may last a few weeks or more and can get worse after delivery. Rarely, symptoms of preeclampsia don't show up until days or even weeks after childbirth. Follow-up care is a key part of your treatment and safety.  Be sure to make and go to all appointments, and call your doctor if you are having problems. It's also a good idea to know your test results and keep a list of the medicines you take. How can you care for yourself at home? Take and record your blood pressure at home if your doctor tells you to. Learn the importance of the two measures of blood pressure (such as 120 over 80, or 120/80). The first number is the systolic pressure, which is the force of blood on the artery walls as the heart pumps. The second number is the diastolic pressure, which is the force of blood on the artery walls between heartbeats, when the heart is at rest. You have a choice of monitors to use. Manual monitor: You pump up the cuff and use a stethoscope to listen for your pulse. Electronic monitor: The cuff inflates, and a gauge shows your pulse rate. To take your blood pressure:  Ask your doctor to check your blood pressure monitor to be sure that it is accurate and that the cuff fits you. Also ask your doctor to watch you to make sure that you are using it right. You should not eat, use tobacco products, or use medicine known to raise blood pressure (such as some nasal decongestant sprays) before you take your blood pressure. Avoid taking your blood pressure if you have just exercised. Also avoid taking it if you are nervous or upset. Rest at least 15 minutes before you take your blood pressure. If your doctor advises, check the protein levels in your urine. Your doctor or nurse will show you how to do this. Take your medicines exactly as prescribed. Call your doctor if you think you are having a problem with your medicine. Do not smoke. Quitting smoking will help improve your baby's growth and health. If you need help quitting, talk to your doctor about stop-smoking programs and medicines. These can increase your chances of quitting for good. Eat a balanced and healthy diet that has lots of fruits and vegetables.   If your doctor advised bed rest, be sure to stay off your feet and rest as much as possible. Keep a phone, phone book, notepad, and pen near the bed where you can easily reach them. Gently stretch your legs every hour to maintain good blood flow. Have another family member pack snacks and lunch food in a cooler close to your bed. Use this time for activities that you usually cannot find time for, such as reading, craft projects, or letter writing. You can keep track of your baby's health by noting the length of time it takes to count 10 movements (such as kicks, flutters, or rolls). Feeling 10 movements in less than 1 hour is considered normal. Track your baby's movements once each day. Bring this record with you to each prenatal visit. When should you call for help? Call 911 anytime you think you may need emergency care. For example, call if:    You passed out (lost consciousness). You have a seizure. Call your doctor now or seek immediate medical care if:    You have symptoms of preeclampsia, such as:  Sudden swelling of your face, hands, or feet. New vision problems (such as dimness or blurring). A severe headache. Your blood pressure is higher than it should be, or it rises suddenly. You have new nausea or vomiting. You think that you are in labor. You have pain in your belly or pelvis. Watch closely for changes in your health, and be sure to contact your doctor if:    You gain weight rapidly. Where can you learn more? Go to https://New Net TechnologiespeSceneDoc.Insiders [employer].. org and sign in to your AlienVault account. Enter R678 in the [employer] box to learn more about \"Preeclampsia: Care Instructions. \"     If you do not have an account, please click on the \"Sign Up Now\" link. Current as of: September 5, 2018  Content Version: 12.0  © 5739-6690 Healthwise, Incorporated. Care instructions adapted under license by [facility] ([facility]).  If you have questions about a medical condition or this instruction, always ask your healthcare professional. [place] [address] any warranty or liability for your use of this information.  Labor: Care Instructions  Your Care Instructions     labor is the start of labor between 21 and 36 weeks of pregnancy. A full-term pregnancy lasts 37 to 42 weeks. In labor, the uterus contracts to open the cervix. This is the first stage of childbirth.  labor can be caused by a problem with the baby, the mother, or both. Often the cause is not known. In some cases, doctors use medicines to try to delay labor until 29 or more weeks of pregnancy. By this time, a baby has grown enough so that problems are not likely. In some cases--such as with a serious infection--it is healthier for the baby to be born early. Your treatment will depend on how far along you are in your pregnancy and on your health and your baby's health. Follow-up care is a key part of your treatment and safety. Be sure to make and go to all appointments, and call your doctor if you are having problems. It's also a good idea to know your test results and keep a list of the medicines you take. How can you care for yourself at home? If your doctor prescribed medicines, take them exactly as directed. Call your doctor if you think you are having a problem with your medicine. Rest until your doctor advises you about activity. He or she will tell you if you should stay in bed most of the time. You may need to arrange for  if you have young children. Do not have sexual intercourse unless your doctor says it is safe. Use pads, not tampons, if you have vaginal bleeding. Make sure to drink plenty of fluids. Dehydration can lead to contractions. If you have kidney, heart, or liver disease and have to limit fluids, talk with your doctor before you increase the amount of fluids you drink. Do not smoke or allow others to smoke around you.  If you need help quitting, talk to your doctor about stop-smoking programs and medicines. These can increase your chances of quitting for good. When should you call for help? Call 911 anytime you think you may need emergency care. For example, call if:    You passed out (lost consciousness). You have severe vaginal bleeding. You have severe pain in your belly or pelvis. You have had fluid gushing or leaking from your vagina and you know or think the umbilical cord is bulging into your vagina. If this happens, immediately get down on your knees so your rear end (buttocks) is higher than your head. This will decrease the pressure on the cord until help arrives. Call your doctor now or seek immediate medical care if:    You have signs of preeclampsia, such as:  Sudden swelling of your face, hands, or feet. New vision problems (such as dimness or blurring). A severe headache. You have any vaginal bleeding. You have belly pain or cramping. You have a fever. You have had regular contractions (with or without pain) for an hour. This means that you have 6 or more within 1 hour after you change your position and drink fluids. You have a sudden release of fluid from the vagina. You have low back pain or pelvic pressure that does not go away. You notice that your baby has stopped moving or is moving much less than normal.    Watch closely for changes in your health, and be sure to contact your doctor if you have any problems. Where can you learn more? Go to https://Userstorylabjuan.NowForce. org and sign in to your Tutorspree account. Enter Q400 in the [facility] box to learn more about \" Labor: Care Instructions. \"     If you do not have an account, please click on the \"Sign Up Now\" link. Current as of: 2018  Content Version: 12.0  © 1041-9654 Healthwise, Reasult. Care instructions adapted under license by  [facility]  If you have questions about a medical condition or this instruction, always ask your healthcare professional. [place] [address] any warranty or liability for your use of this information.

## 2023-01-19 NOTE — PROGRESS NOTES
S:  Here for NST for fetal surveillance secondary to high risk pregnancy  IIVF, tDi/Di twin gestation, FGR, and now ICP    Reports fetal movement, denies leakage of fluid. Pt reported on 23:   She had noted in December episode of itching to her axilla and then went away and then this past week noted similar itching to axilla and went away then had one palm that itched but then went away. Denies current itching to palms of hands or soles of feet, Denies increased itching at night, RUQ abdominal pain, or nausea/poor appetite. Bile acids and CMP ordered  Elevated Bile acid 11, ALT/ASt WNL  States noted couple episodes itching once bottom of foot and once on abdomen. Denies persistent  itching to palms of hands or soles of feet, increased itching at night, RUQ abdominal pain, or nausea/poor appetite.       [place]     DOES NOT WANT TO KNOW GENDER OF BABIES baby A on left baby B on right    Celestone injection-  and  in office    G1P    Plans to deliver: [facility]    FOB dorian    1st trimester screen/NIPs:     AFP- NEG    Fetal Echo    High Risk:  Di/Di Twins  IVF  FGR fetus A  ICP- eleavted bile acids- 11 on 23,ALT/AST WNL- started on ursodiol 2023   Weekly bile acids and CMP needed   testing to be done at 32 weeks- dopplers/BPP at [facility], NST here in office- all set up- NST twice weekly recommended    Early 1 hr GTT:    Covid Vaccine: Yes Milenamie Messier and booster     Flu Vaccine: given 22    Tdap: given 1/3/23    Rhogam: n/a, A+    1hr GTT: passed 102 on     3hr GTT:    GBS:    2nd trimester appointment with Dr. Jose Jones :     3rd trimester appointment with Dr. Jose Jones :     [facility]       O:  Vitals:    23 1305   Weight: 169 lb (76.7 kg)      Fetus A:   Baseline:  120    Variability:  Moderate  Accelerations:  Present  Decelerations:  Absent  Fetal Movement:  Perceived by patient during NST  Contractions:  Absent    Fetus B:   Baseline: 120    Variability:  Moderate  Accelerations:  Present  Decelerations:  Absent  Fetal Movement:  Perceived by patient during NST  Contractions:  Absent                      A/P:    1. High risk pregnancy, antepartum  rNST Fetus A &B, BPP 8/8 fetus A&B from [facility] this AM  - MT FETAL NONSTRESS TEST  - MT THERAPEUTIC PROPHYLACTIC/DX INJECTION SUBQ/IM    2. Dichorionic diamniotic twin pregnancy in third trimester  rNST Fetus A &B, BPP 8/8 fetus A&B from [facility] this AM  - MT FETAL NONSTRESS TEST  - MT THERAPEUTIC PROPHYLACTIC/DX INJECTION SUBQ/IM    3. 34 weeks gestation of pregnancy  rNST Fetus A &B, BPP 8/8 fetus A&B from [facility] this AM  - MT FETAL NONSTRESS TEST  - MT THERAPEUTIC PROPHYLACTIC/DX INJECTION SUBQ/IM    4. Pregnancy affected by fetal growth restriction  rNST Fetus A &B, BPP 8/8 fetus A&B from [facility] this AM  - MT FETAL NONSTRESS TEST    5. Microcephaly, fetal, affecting care of mother, antepartum, not applicable or unspecified fetus  rNST Fetus A &B, BPP 8/8 fetus A&B from [facility] this AM  - MT FETAL NONSTRESS TEST    6. 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 Pregnancy resulting from in vitro fertilization, third trimester      8. Intrahepatic cholestasis of pregnancy in third trimester  - Itching symptoms- labs ordered elevated bile acids- 11, ursodiol 300 mg TID script sent instructed to start today. Her pruritis with labs are concerning for intrahepatic cholestasis of pregnancy. Reviewed with pt. ALT/AST WNL. Repeat labs weekly until delivery.     Will need  testing starting at 32 weeks  including NST twice weekly /BPP, weekly with [facility]lers [facility], Delivery between 36w 0/7- 37w6d   - MT FETAL NONSTRESS TEST      Reactive NST    Keep scheduled fetal surveillance appointment  Continue Fetal Kick Counts     Of the 30 minute duration appointment visit, Alisa Lawson CNP spent at least 50% of the face-to-face time in counseling, explanation of diagnosis, planning of further management, and answering all questions.

## 2023-01-19 NOTE — PROGRESS NOTES
Received second celestone injection, LUOQ. Tolerated well.    NST today, will go over labs with Candi and ANDREW.

## 2023-01-23 ENCOUNTER — ROUTINE PRENATAL (OUTPATIENT)
Dept: OBGYN CLINIC | Age: 33
End: 2023-01-23
Payer: OTHER GOVERNMENT

## 2023-01-23 VITALS — DIASTOLIC BLOOD PRESSURE: 74 MMHG | WEIGHT: 165 LBS | BODY MASS INDEX: 26.63 KG/M2 | SYSTOLIC BLOOD PRESSURE: 120 MMHG

## 2023-01-23 DIAGNOSIS — Z3A.35 35 WEEKS GESTATION OF PREGNANCY: ICD-10-CM

## 2023-01-23 DIAGNOSIS — O09.90 HIGH RISK PREGNANCY, ANTEPARTUM: Primary | ICD-10-CM

## 2023-01-23 DIAGNOSIS — K83.1 INTRAHEPATIC CHOLESTASIS OF PREGNANCY IN THIRD TRIMESTER: ICD-10-CM

## 2023-01-23 DIAGNOSIS — O43.123 VELAMENTOUS INSERTION OF UMBILICAL CORD IN THIRD TRIMESTER: ICD-10-CM

## 2023-01-23 DIAGNOSIS — O26.613 INTRAHEPATIC CHOLESTASIS OF PREGNANCY IN THIRD TRIMESTER: ICD-10-CM

## 2023-01-23 DIAGNOSIS — O30.043 DICHORIONIC DIAMNIOTIC TWIN PREGNANCY IN THIRD TRIMESTER: ICD-10-CM

## 2023-01-23 DIAGNOSIS — O09.813 PREGNANCY RESULTING FROM IN VITRO FERTILIZATION, THIRD TRIMESTER: ICD-10-CM

## 2023-01-23 PROCEDURE — 0502F SUBSEQUENT PRENATAL CARE: CPT | Performed by: OBSTETRICS & GYNECOLOGY

## 2023-01-23 PROCEDURE — 59025 FETAL NON-STRESS TEST: CPT | Performed by: OBSTETRICS & GYNECOLOGY

## 2023-01-23 NOTE — PROGRESS NOTES
Tatiana Bella is a 29 y.o. female 35w3d        OB History    Para Term  AB Living   1 0 0 0 0     SAB IAB Ectopic Molar Multiple Live Births   0 0 0            # Outcome Date GA Lbr Casey/2nd Weight Sex Delivery Anes PTL Lv   1 Current                  Vitals  BP: 120/74  Weight: 165 lb (74.8 kg)  Patient Position: Sitting  Albumin: Negative  Glucose: Negative      The patient was seen and evaluated. There was positive fetal movements. No contractions or leakage of fluid. Signs and symptoms of labor were reviewed. The S/S of Pre-Eclampsia were reviewed with the patient in detail. She is to report any of these if they occur. She currently denies any of these. The patient was instructed on fetal kick counts and was given a kick sheet to complete every 8 hours. She was instructed that the baby should move at a minimum of ten times within one hour after a meal. The patient was instructed to lay down on her left side twenty minutes after eating and count movements for up to one hour with a target value of ten movements. She was instructed to notify the office if she did not make that target after two attempts or if after any attempt there was less than four movements. The patient reports that the targets have been made Yes.     Insurance  [place]     DOES NOT WANT TO KNOW GENDER OF BABIES baby A on left baby B on right    Celestone injection-  and  in office    G1P    Plans to deliver: [facility]    FOB dorian    1st trimester screen/NIPs:     AFP- NEG    Fetal Echo    High Risk:  Di/Di Twins  IVF  FGR fetus A  ICP- eleavted bile acids- 11 on 23,ALT/AST WNL- started on ursodiol 2023   Weekly bile acids and CMP needed   testing to be done at 32 weeks- dopplers/BPP at [facility] 120, NST here in office- all set up- NST twice weekly recommended    Early 1 hr GTT:    Covid Vaccine: Yes Nannette April and booster     Flu Vaccine: given 22    Tdap: given 1/3/23    Rhogam: n/a, A+    1hr GTT: passed 102 on     3hr GTT:    GBS:    2nd trimester appointment with Dr. González Thapa :     3rd trimester appointment with Dr. González Thapa :     [facility]     **IUGR, ICP, Twin gestation - recommend delivery 37w; Pt is declining trial of labor. Counseled on R/B/A of C/S and ACOG guidelines and recommendations. She states she is open to spontaneous labor but is refusing induction. She prefers to proceed with scheduled **      T-Dap Vaccine (27-36 weeks) Completed: Yes    Allergies: Allergies as of 2023 - Fully Reviewed 2023   Allergen Reaction Noted    Shellfish-derived products  2020       Group Beta Strep collection was completed. No: to be done at next visit   GBS Results:   Hospital Outpatient Visit on 2023   Component Date Value Ref Range Status    Glucose 2023 61 (A)  70 - 99 mg/dL Final    BUN 2023 10  6 - 20 mg/dL Final    Creatinine 2023 0.63  0.50 - 0.90 mg/dL Final    Est, Glom Filt Rate 2023 >60  >60 mL/min/1.73m2 Final    Comment:       Effective Oct 3, 2022        These results are not intended for use in patients <25years of age. eGFR results are calculated without a race factor using the  CKD-EPI equation. Careful clinical correlation is recommended, particularly when comparing to results   calculated using previous equations. The CKD-EPI equation is less accurate in patients with extremes of muscle mass, extra-renal   metabolism of creatine, excessive creatine ingestion, or following therapy that affects   renal tubular secretion.       Calcium 2023 8.9  8.6 - 10.4 mg/dL Final    Sodium 2023 137  135 - 144 mmol/L Final    Potassium 2023 4.7  3.7 - 5.3 mmol/L Final    Chloride 2023 104  98 - 107 mmol/L Final    CO2 2023 20  20 - 31 mmol/L Final    Anion Gap 2023 13  9 - 17 mmol/L Final    Alkaline Phosphatase 2023 130 (A)  35 - 104 U/L Final    ALT 2023 13  5 - 33 U/L Final    AST 2023 21  <32 U/L Final    Total Bilirubin 2023 0.3  0.3 - 1.2 mg/dL Final    Total Protein 2023 6.3 (A)  6.4 - 8.3 g/dL Final    Albumin 2023 3.3 (A)  3.5 - 5.2 g/dL Final    Albumin/Globulin Ratio 2023 1.1  1.0 - 2.5 Final    Bile Acids Total 2023 11 (A)  0 - 10 umol/L Final    Comment: (NOTE)  INTERPRETIVE INFORMATION: Bile Acids, Total  Reference Interval applies to fasting specimens. Performed By: [facility] [address]  [place], [address] [facility]  : Joyce Rodriguez MD, PhD     ]  Sensitivities for clindamycin and erythromycin were ordered. No      The patient was counseled on the mandatory call ahead policy. She has been instructed to call the office at anytime prior to going into the hospital so the on-call physician may direct her to the appropriate facility for care. Exceptions were reviewed including but not limited to: Decreased fetal movement, vaginal Bleeding or hemorrhage, trauma, readily expectant delivery, or any instance where she feels 911 should be utilized. The patient was counseled on Labor & Delivery. Route of delivery and counseling on vaginal, operative vaginal, and  sections were completed with the risks of each to both the patient as well as her baby. The possibility of a blood transfusion was discussed as well. The patient was not opposed to receiving a transfusion if needed. The patient was counseled on types of analgesia during labor and is considering either Regional or IV medication the risks, benefits and alternatives were discussed.  Testing:  Yes, Di-Di Twin gestation, IUGR, ICP, Velamentous cord insertion                      Assessment:  1. Nicol Baez is a 29 y.o. female  2.    3. 35w3d      Patient Active Problem List    Diagnosis Date Noted    Intrahepatic cholestasis of pregnancy in third trimester 2023     Priority: Medium    33 weeks gestation of pregnancy 2023     Priority: Medium    Needs flu shot 2022     Priority: Medium    High risk pregnancy, antepartum 2022     Priority: Medium    IVF pregnancy 2022     Priority: Medium     Overview Note:     [place] in [address] diamniotic twin pregnancy  2022     Priority: Medium    Female infertility 2021     Priority: Medium    FGR Baby A and B  2023     Overview Note:     Baby A ( AC <10th%)  Baby B ( AC<10th%)      Recurrent candidiasis of vagina 2019    Gastroesophageal reflux disease 2019    Acute recurrent maxillary sinusitis 2016    Seasonal allergic rhinitis 2016    Anxiety and depression 2012     Overview Note:     No meds currently      Environmental allergies         Diagnosis Orders   1. High risk pregnancy, antepartum  MT FETAL NONSTRESS TEST    Bile Acids, Total      2. Dichorionic diamniotic twin pregnancy in third trimester  MT FETAL NONSTRESS TEST      3. 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 Pregnancy resulting from in vitro fertilization, third trimester  MT FETAL NONSTRESS TEST      5. 35 weeks gestation of pregnancy  MT FETAL NONSTRESS TEST    Bile Acids, Total      6. Intrahepatic cholestasis of pregnancy in third trimester  Bile Acids, Total                Plan:  The patient will return to the office for her next visit in 1 weeks. See antepartum flow sheet. Counseled on s/s of preeclampsia. Counseled on s/s of  labor. Counseled on fetal movement  Counseled on delivery method and timing and all R/B/A associated with decision for timing and method as well as ACOG recommendations and guidelines and she is adamant about 37w  section if no spontaneous labor before that time. Patient was seen with total face to face time of 20 minutes.  More than 50% of this visit was on counseling and education regarding her    Diagnosis Orders   1. High risk pregnancy, antepartum  CO FETAL NONSTRESS TEST    Bile Acids, Total      2. Dichorionic diamniotic twin pregnancy in third trimester  CO FETAL NONSTRESS TEST      3. Velamentous insertion of umbilical cord in third trimester  CO FETAL NONSTRESS TEST      4. Pregnancy resulting from in vitro fertilization, third trimester  CO FETAL NONSTRESS TEST      5. 35 weeks gestation of pregnancy  CO FETAL NONSTRESS TEST    Bile Acids, Total      6. Intrahepatic cholestasis of pregnancy in third trimester  Bile Acids, Total       and her options. She was also counseled on her preventative health maintenance recommendations and follow-up.

## 2023-01-24 ENCOUNTER — HOSPITAL ENCOUNTER (INPATIENT)
Age: 33
LOS: 5 days | Discharge: HOME | DRG: 771 | End: 2023-01-29
Attending: OBSTETRICS & GYNECOLOGY | Admitting: OBSTETRICS & GYNECOLOGY
Payer: OTHER GOVERNMENT

## 2023-01-24 ENCOUNTER — ANESTHESIA EVENT (OUTPATIENT)
Dept: LABOR AND DELIVERY | Age: 33
End: 2023-01-24
Payer: OTHER GOVERNMENT

## 2023-01-24 ENCOUNTER — HOSPITAL ENCOUNTER (OUTPATIENT)
Age: 33
Setting detail: SPECIMEN
Discharge: HOME | End: 2023-01-24

## 2023-01-24 ENCOUNTER — ANESTHESIA (OUTPATIENT)
Dept: LABOR AND DELIVERY | Age: 33
End: 2023-01-24
Payer: OTHER GOVERNMENT

## 2023-01-24 DIAGNOSIS — Z3A.35 35 WEEKS GESTATION OF PREGNANCY: ICD-10-CM

## 2023-01-24 DIAGNOSIS — O26.613 INTRAHEPATIC CHOLESTASIS OF PREGNANCY IN THIRD TRIMESTER: ICD-10-CM

## 2023-01-24 DIAGNOSIS — K83.1 INTRAHEPATIC CHOLESTASIS OF PREGNANCY IN THIRD TRIMESTER: ICD-10-CM

## 2023-01-24 DIAGNOSIS — O09.90 HIGH RISK PREGNANCY, ANTEPARTUM: ICD-10-CM

## 2023-01-24 PROBLEM — O09.93 HIGH-RISK PREGNANCY IN THIRD TRIMESTER: Status: ACTIVE | Noted: 2023-01-24

## 2023-01-24 PROBLEM — N97.9 FEMALE INFERTILITY: Status: RESOLVED | Noted: 2021-06-06 | Resolved: 2023-01-24

## 2023-01-24 PROBLEM — Z23 NEEDS FLU SHOT: Status: RESOLVED | Noted: 2022-09-09 | Resolved: 2023-01-24

## 2023-01-24 PROBLEM — B37.31 RECURRENT CANDIDIASIS OF VAGINA: Status: RESOLVED | Noted: 2019-04-02 | Resolved: 2023-01-24

## 2023-01-24 PROBLEM — Z3A.33 33 WEEKS GESTATION OF PREGNANCY: Status: RESOLVED | Noted: 2023-01-08 | Resolved: 2023-01-24

## 2023-01-24 LAB
ABSOLUTE EOS #: 0.17 K/UL (ref 0–0.44)
ABSOLUTE IMMATURE GRANULOCYTE: 0.07 K/UL (ref 0–0.3)
ABSOLUTE LYMPH #: 2.03 K/UL (ref 1.1–3.7)
ABSOLUTE MONO #: 0.93 K/UL (ref 0.1–1.2)
ALBUMIN SERPL-MCNC: 3 G/DL (ref 3.5–5.2)
ALBUMIN/GLOBULIN RATIO: 1 (ref 1–2.5)
ALP BLD-CCNC: 127 U/L (ref 35–104)
ALT SERPL-CCNC: 10 U/L (ref 5–33)
ANION GAP SERPL CALCULATED.3IONS-SCNC: 13 MMOL/L (ref 9–17)
AST SERPL-CCNC: 15 U/L
BASOPHILS # BLD: 0 % (ref 0–2)
BASOPHILS ABSOLUTE: 0.04 K/UL (ref 0–0.2)
BILIRUB SERPL-MCNC: 0.2 MG/DL (ref 0.3–1.2)
BUN BLDV-MCNC: 14 MG/DL (ref 6–20)
CALCIUM SERPL-MCNC: 8.9 MG/DL (ref 8.6–10.4)
CHLORIDE BLD-SCNC: 102 MMOL/L (ref 98–107)
CO2: 20 MMOL/L (ref 20–31)
CREAT SERPL-MCNC: 0.62 MG/DL (ref 0.5–0.9)
CREATININE URINE: 101.1 MG/DL (ref 28–217)
EOSINOPHILS RELATIVE PERCENT: 2 % (ref 1–4)
GFR SERPL CREATININE-BSD FRML MDRD: >60 ML/MIN/1.73M2
GLUCOSE BLD-MCNC: 95 MG/DL (ref 70–99)
HCT VFR BLD CALC: 33.7 % (ref 36.3–47.1)
HEMOGLOBIN: 11 G/DL (ref 11.9–15.1)
IMMATURE GRANULOCYTES: 1 %
LYMPHOCYTES # BLD: 19 % (ref 24–43)
MCH RBC QN AUTO: 30.3 PG (ref 25.2–33.5)
MCHC RBC AUTO-ENTMCNC: 32.6 G/DL (ref 28.4–34.8)
MCV RBC AUTO: 92.8 FL (ref 82.6–102.9)
MONOCYTES # BLD: 9 % (ref 3–12)
NRBC AUTOMATED: 0 PER 100 WBC
PDW BLD-RTO: 12.5 % (ref 11.8–14.4)
PLATELET # BLD: 150 K/UL (ref 138–453)
PMV BLD AUTO: 12.8 FL (ref 8.1–13.5)
POTASSIUM SERPL-SCNC: 3.8 MMOL/L (ref 3.7–5.3)
RBC # BLD: 3.63 M/UL (ref 3.95–5.11)
SEG NEUTROPHILS: 69 % (ref 36–65)
SEGMENTED NEUTROPHILS ABSOLUTE COUNT: 7.2 K/UL (ref 1.5–8.1)
SODIUM BLD-SCNC: 135 MMOL/L (ref 135–144)
T. PALLIDUM, IGG: NONREACTIVE
TOTAL PROTEIN, URINE: 136 MG/DL
TOTAL PROTEIN: 5.9 G/DL (ref 6.4–8.3)
URINE TOTAL PROTEIN CREATININE RATIO: 1.35 (ref 0–0.2)
WBC # BLD: 10.4 K/UL (ref 3.5–11.3)

## 2023-01-24 PROCEDURE — 80053 COMPREHEN METABOLIC PANEL: CPT

## 2023-01-24 PROCEDURE — 86901 BLOOD TYPING SEROLOGIC RH(D): CPT

## 2023-01-24 PROCEDURE — 85025 COMPLETE CBC W/AUTO DIFF WBC: CPT

## 2023-01-24 PROCEDURE — 6370000000 HC RX 637 (ALT 250 FOR IP): Performed by: STUDENT IN AN ORGANIZED HEALTH CARE EDUCATION/TRAINING PROGRAM

## 2023-01-24 PROCEDURE — 86900 BLOOD TYPING SEROLOGIC ABO: CPT

## 2023-01-24 PROCEDURE — 2580000003 HC RX 258: Performed by: STUDENT IN AN ORGANIZED HEALTH CARE EDUCATION/TRAINING PROGRAM

## 2023-01-24 PROCEDURE — 82570 ASSAY OF URINE CREATININE: CPT

## 2023-01-24 PROCEDURE — 1220000000 HC SEMI PRIVATE OB R&B

## 2023-01-24 PROCEDURE — 86780 TREPONEMA PALLIDUM: CPT

## 2023-01-24 PROCEDURE — 86850 RBC ANTIBODY SCREEN: CPT

## 2023-01-24 PROCEDURE — 36415 COLL VENOUS BLD VENIPUNCTURE: CPT

## 2023-01-24 PROCEDURE — 84156 ASSAY OF PROTEIN URINE: CPT

## 2023-01-24 PROCEDURE — 86920 COMPATIBILITY TEST SPIN: CPT

## 2023-01-24 PROCEDURE — 87081 CULTURE SCREEN ONLY: CPT

## 2023-01-24 RX ORDER — TRISODIUM CITRATE DIHYDRATE AND CITRIC ACID MONOHYDRATE 500; 334 MG/5ML; MG/5ML
30 SOLUTION ORAL ONCE
Status: COMPLETED | OUTPATIENT
Start: 2023-01-24 | End: 2023-01-25

## 2023-01-24 RX ORDER — ACETAMINOPHEN 325 MG/1
975 TABLET ORAL ONCE
Status: COMPLETED | OUTPATIENT
Start: 2023-01-24 | End: 2023-01-24

## 2023-01-24 RX ORDER — ACETAMINOPHEN 325 MG/1
650 TABLET ORAL EVERY 4 HOURS PRN
Status: DISCONTINUED | OUTPATIENT
Start: 2023-01-24 | End: 2023-01-25

## 2023-01-24 RX ORDER — SODIUM CHLORIDE 9 MG/ML
25 INJECTION, SOLUTION INTRAVENOUS PRN
Status: DISCONTINUED | OUTPATIENT
Start: 2023-01-24 | End: 2023-01-25

## 2023-01-24 RX ORDER — SODIUM CHLORIDE, SODIUM LACTATE, POTASSIUM CHLORIDE, AND CALCIUM CHLORIDE .6; .31; .03; .02 G/100ML; G/100ML; G/100ML; G/100ML
1000 INJECTION, SOLUTION INTRAVENOUS ONCE
Status: COMPLETED | OUTPATIENT
Start: 2023-01-24 | End: 2023-01-24

## 2023-01-24 RX ORDER — SODIUM CHLORIDE 0.9 % (FLUSH) 0.9 %
10 SYRINGE (ML) INJECTION EVERY 12 HOURS SCHEDULED
Status: DISCONTINUED | OUTPATIENT
Start: 2023-01-24 | End: 2023-01-25

## 2023-01-24 RX ORDER — SODIUM CHLORIDE, SODIUM LACTATE, POTASSIUM CHLORIDE, CALCIUM CHLORIDE 600; 310; 30; 20 MG/100ML; MG/100ML; MG/100ML; MG/100ML
INJECTION, SOLUTION INTRAVENOUS CONTINUOUS
Status: DISCONTINUED | OUTPATIENT
Start: 2023-01-24 | End: 2023-01-25

## 2023-01-24 RX ORDER — SODIUM CHLORIDE 0.9 % (FLUSH) 0.9 %
10 SYRINGE (ML) INJECTION PRN
Status: DISCONTINUED | OUTPATIENT
Start: 2023-01-24 | End: 2023-01-25

## 2023-01-24 RX ORDER — ONDANSETRON 4 MG/1
4 TABLET, ORALLY DISINTEGRATING ORAL EVERY 8 HOURS PRN
Status: DISCONTINUED | OUTPATIENT
Start: 2023-01-24 | End: 2023-01-25

## 2023-01-24 RX ORDER — LANOLIN ALCOHOL/MO/W.PET/CERES
400 CREAM (GRAM) TOPICAL DAILY
Status: DISCONTINUED | OUTPATIENT
Start: 2023-01-25 | End: 2023-01-24

## 2023-01-24 RX ORDER — LANOLIN ALCOHOL/MO/W.PET/CERES
400 CREAM (GRAM) TOPICAL DAILY
Status: DISCONTINUED | OUTPATIENT
Start: 2023-01-24 | End: 2023-01-25

## 2023-01-24 RX ORDER — LANOLIN ALCOHOL/MO/W.PET/CERES
3 CREAM (GRAM) TOPICAL NIGHTLY PRN
Status: DISCONTINUED | OUTPATIENT
Start: 2023-01-24 | End: 2023-01-25

## 2023-01-24 RX ORDER — DIPHENHYDRAMINE HCL 25 MG
25 TABLET ORAL EVERY 6 HOURS PRN
Status: DISCONTINUED | OUTPATIENT
Start: 2023-01-24 | End: 2023-01-25

## 2023-01-24 RX ORDER — ONDANSETRON 2 MG/ML
4 INJECTION INTRAMUSCULAR; INTRAVENOUS EVERY 6 HOURS PRN
Status: DISCONTINUED | OUTPATIENT
Start: 2023-01-24 | End: 2023-01-24 | Stop reason: SDUPTHER

## 2023-01-24 RX ORDER — ONDANSETRON 2 MG/ML
4 INJECTION INTRAMUSCULAR; INTRAVENOUS EVERY 6 HOURS PRN
Status: DISCONTINUED | OUTPATIENT
Start: 2023-01-24 | End: 2023-01-25

## 2023-01-24 RX ADMIN — SODIUM CHLORIDE, POTASSIUM CHLORIDE, SODIUM LACTATE AND CALCIUM CHLORIDE 1000 ML: 600; 310; 30; 20 INJECTION, SOLUTION INTRAVENOUS at 18:31

## 2023-01-24 RX ADMIN — MAGNESIUM GLUCONATE 500 MG ORAL TABLET 400 MG: 500 TABLET ORAL at 23:26

## 2023-01-24 RX ADMIN — ACETAMINOPHEN 975 MG: 325 TABLET ORAL at 21:59

## 2023-01-24 ASSESSMENT — PAIN DESCRIPTION - DESCRIPTORS: DESCRIPTORS: ACHING

## 2023-01-24 ASSESSMENT — PAIN DESCRIPTION - LOCATION: LOCATION: HEAD

## 2023-01-24 NOTE — DISCHARGE SUMMARY
Obstetric Discharge Summary  [facility]    Patient Name: Corwin Renee  Patient : 1990  Primary Care Physician: Long Mcleod MD  Admit Date: 2023    Principal Diagnosis: IUP at 35w4d, admitted for  section 2/2 Di DI twins with FGR and Oligohydramnios     Her pregnancy has been complicated by:   Patient Active Problem List   Diagnosis    Anxiety and depression    Gastroesophageal reflux disease    Dichorionic diamniotic twin pregnancy     IVF pregnancy    FGR Baby A and B     Intrahepatic cholestasis of pregnancy in third trimester    Celestone x  &     PLTCS 23 M Apg 8/9 Wt 4#11, F Apg 8/9 Wt 3#13    Postpartum care following  delivery       Infection Present?: No  Hospital Acquired: No    Surgical Operations & Procedures:  Analgesia: spinal  Delivery Type:  Delivery: See Labor and Delivery Summary   Laceration(s): Absent    Consultations: NICU and Anesthesia    Pertinent Findings & Procedures:   Corwin Renee is a 29 y.o. female  at 35w4d admitted for decreased fetal movement. During BPP Baby A was diagnosed with oligohydramnios. Patient now meeting criteria for delivery decision made to proceed with primary  section. Patient received ancef, bicitra pre operatively. She delivered by primary low transverse  a Live Born infant on 23. Information for the patient's :  Gabby Pineda [[id number]]   male   Birth Weight: 4 lb 11.8 oz (2.15 kg)   Information for the patient's :  Ulanda Sero Girl Baby Kalyan [[id number]]   female   Birth Weight: 3 lb 13.4 oz (1.74 kg)     Apgars: Baby A 8 at 1 minute and 9 at 5 minutes. Baby B 8 at 1 minute and 9 at 5 minutes.      Postpartum course: complicated by anemia  POD#1: patient complained of dizziness and lightheadedness, Hgb 7.8, she received 1 unit PRBC, post transfusion Hgb 10.3, iron studies completed and support diagnosis    Course of patient: complicated by anemia which was resolved with 1 unit PRBC      Discharge to: Home    Readmission planned: no     Recommendations on Discharge:     Medications:      Medication List        START taking these medications      ferrous sulfate 325 (65 Fe) MG EC tablet  Commonly known as: Fe Tabs  Take 1 tablet by mouth 2 times daily     ibuprofen 800 MG tablet  Commonly known as: ADVIL;MOTRIN  Take 1 tablet by mouth every 8 hours as needed for Pain or Fever     oxyCODONE 5 MG immediate release tablet  Commonly known as: Roxicodone  Take 1 tablet by mouth every 6 hours as needed for Pain for up to 20 doses. Intended supply: 3 days. Take lowest dose possible to manage pain Max Daily Amount: 20 mg     sennosides-docusate sodium 8.6-50 MG tablet  Commonly known as: SENOKOT-S  Take 1 tablet by mouth daily            CONTINUE taking these medications      Breast Pump Misc  Double electric breast pump     UNABLE TO FIND            STOP taking these medications      aspirin 81 MG chewable tablet     ursodiol 300 MG capsule  Commonly known as: Actigall            ASK your doctor about these medications      cetirizine 10 MG tablet  Commonly known as: ZYRTEC               Where to Get Your Medications        These medications were sent to [facility] [address] [facility], [address] [facility]  [address] [facility], [address] [address]      Phone: [phone]   ferrous sulfate 325 (65 Fe) MG EC tablet  ibuprofen 800 MG tablet  oxyCODONE 5 MG immediate release tablet  sennosides-docusate sodium 8.6-50 MG tablet           Activity: pelvic rest x 6 weeks, no driving on narcotics, no lifting greater than 15 lbs  Diet: regular diet  Follow up: 1 week for Silver dressing removal    Condition on discharge: stable    Discharge date: 1/29/23      Sherlyn Angeles MD  Ob/Gyn Resident    Comments:  Home care and follow-up care were reviewed.   Pelvic rest, and birth control were reviewed. Signs and symptoms of mastitis and post partum depression were reviewed. The patient is to notify her physician if any of these occur. The patient was counseled on secondary smoke risks and the increased risk of sudden infant death syndrome and respiratory problems to her baby with exposure. She was counseled on various alternate recommendations to decrease the exposure to secondary smoke to her children.

## 2023-01-24 NOTE — H&P
OBSTETRICAL HISTORY [facility]    Date: 2023       Time: 5:09 PM   Patient Name: Shirley Salazar     Patient : 1990  Room/Bed: [place] [address]    Admission Date/Time: 2023  3:45 PM      CC: Decreased fetal movement     HPI: Shirley Salazar is a 29 y.o. Ma Rae at 35w4d who presents with complaints of decreased fetal movement of Baby A on her maternal left. The patient reports she began experiencing decreased movement at 0600 and it has not improved or worsened. Patient reports normal fetal movement in Baby B on maternal right. The patient denies contractions, denies loss of fluid, denies vaginal bleeding. Patient denies headache, vision changes, nausea, vomiting, fever, chills, shortness of breath, chest pain, RUQ pain, abdominal pain, diarrhea, change in color/amount/odor of vaginal discharge, dysuria or, hematuria. DATING:  LMP: Patient's last menstrual period was 2022.   Estimated Date of Delivery: 23   Based on: LMP    PREGNANCY RISK FACTORS:  Patient Active Problem List   Diagnosis    Environmental allergies    Anxiety and depression    Acute recurrent maxillary sinusitis    Seasonal allergic rhinitis    Recurrent candidiasis of vagina    Gastroesophageal reflux disease    Dichorionic diamniotic twin pregnancy     Female infertility    High risk pregnancy, antepartum    IVF pregnancy    Needs flu shot    FGR Baby A and B     Intrahepatic cholestasis of pregnancy in third trimester    Celestone x  &         Steroids Given In This Pregnancy:  yes, date:  &      REVIEW OF SYSTEMS:   Constitutional: negative fever, negative chills, negative weight changes   HEENT: negative visual disturbances, negative headaches, negative dizziness, negative hearing loss  Breast: Negative breast abnormalities, negative breast lumps, negative nipple discharge  Respiratory: negative dyspnea, negative cough, negative SOB  Cardiovascular: negative chest pain,  negative palpitations, negative arrhythmia, negative syncope   Gastrointestinal: negative abdominal pain, negative RUQ pain, negative N/V, negative diarrhea, negative constipation, negative bowel changes, negative heartburn   Genitourinary: negative dysuria, negative hematuria, negative urinary incontinence, negative vaginal discharge, negative vaginal bleeding or spotting  Dermatological: negative rash, negative pruritis, negative mole or other skin changes  Hematologic: negative bruising  Immunologic/Lymphatic: negative recent illness, negative recent sick contact  Musculoskeletal: negative back pain, negative myalgias, negative arthralgias  Neurological:  negative dizziness, negative migraines, negative seizures, negative weakness  Behavior/Psych: negative depression, negative anxiety, negative SI, negative HI    OBSTETRIC HISTORY:   OB History    Para Term  AB Living   1 0 0 0 0 0   SAB IAB Ectopic Molar Multiple Live Births   0 0 0 0 0 0      # Outcome Date GA Lbr Casey/2nd Weight Sex Delivery Anes PTL Lv   1 Current                PAST MEDICAL HISTORY:   has a past medical history of Anxiety, C. difficile colitis, Depression, Environmental allergies, Intrahepatic cholestasis of pregnancy in third trimester, and Seasonal allergic rhinitis. PAST SURGICAL HISTORY:   has a past surgical history that includes [place] tooth extraction and Tonsillectomy and adenoidectomy (). ALLERGIES:  is allergic to shellfish-derived products. MEDICATIONS:  Prior to Admission medications    Medication Sig Start Date End Date Taking? Authorizing Provider   ursodiol (ACTIGALL) 300 MG capsule Take 1 capsule by mouth 3 times daily (with meals) 23   DENTON Delarosa CNP   [facility].  Devices (BREAST PUMP) [facility] Double electric breast pump  Patient not taking: No sig reported 22   DENTON Delarosa CNP   aspirin 81 MG chewable tablet Take 81 mg by mouth in the morning. Historical Provider, MD   [address] [facility]    Historical Provider, MD   cetirizine (ZYRTEC) 10 MG tablet Take 10 mg by mouth daily  Patient not taking: No sig reported    Historical Provider, MD       FAMILY HISTORY:  family history includes Cancer in an other family member; High Blood Pressure in her father; High Cholesterol in her father. SOCIAL HISTORY:   reports that she has never smoked. She has never used smokeless tobacco. She reports that she does not currently use alcohol. She reports that she does not use drugs. VITALS:    Vitals:    01/24/23 1636 01/24/23 1640 01/24/23 1641 01/24/23 1646   BP:  (!) 134/93     Pulse:  (!) 104     Resp:  16     Temp:       TempSrc:       SpO2: 98% 98% 97% 97%         PHYSICAL EXAM:  Fetal Heart Monitor:   BABY A:  Baseline Heart Rate 140, moderate variability, present accelerations, absent decelerations  BABY B: Baseline Heart Rate 145, moderate variability, present accelerations, absent decelerations  [place]: contractions, none    General appearance:  no apparent distress, alert and cooperative  HEENT: head atraumatic, normocephalic, moist mucous membranes, trachea midline  Neurologic:  alert, oriented, normal speech, no focal findings or movement disorder noted  Lungs:  No increased work of breathing  Heart:  regular rate and rhythm  Abdomen:  soft, gravid, non-tender, no rebound, guarding  Extremities:  no calf tenderness, non edematous  Musculoskeletal: Gross strength equal and intact throughout, no gross abnormalities  Psychiatric: Mood appropriate, normal affect   Rectal Exam: not indicated    PRENATAL LAB RESULTS:   Blood Type/Rh: A pos  Antibody Screen: negative  Hemoglobin, Hematocrit, Platelets: 51.7/40.6/223  Rubella: immune  T.  Pallidum, IgG: non-reactive  Hepatitis B Surface Antigen: non-reactive   Hepatitis C Antibody: non-reactive   HIV: non-reactive   Gonorrhea: negative  Chlamydia: negative  Urine culture: negative, date: 22 and 23    1 hour Glucose Tolerance Test:  102    Group B Strep: not done   Cystic Fibrosis Screen: negative  Sickle Cell Screen: negative  First Trimester Screen: not done  QUAD: not done  MSAFP: not done   Non-Invasive Prenatal Testing: low risk for aneuploidy  Anatomy US: BABY A: posterior placenta, 3VC, male gender, normal anatomy    BABY B: Anterior placenta, 3VC, female gender, normal anatomy    ASSESSMENT & PLAN:  Damari Sevilla is a 29 y.o. female  at 29w2d [facility] Awa Cote   - GBS unknown / Rh positive / R immune   - No indication for GBS prophylaxis at this time   - Elevated BP on arrival, other VSS    [place] [facility] Tw[facility] (IVF)   - Pregnancy with IVF technology    - NIPT wnl    - No pre-implantation genetics available    - Following with MFM    - Scheduled for  section on 2/3/23 with Dr. Anai Hernandez     Elevated BP x2   - Elevated, nonsevere BP x2 on arrival    - Denies s/s PreE    - PreE labs ordered and pending    - Will monitor BP for 4 hours to r/o gestational hypertension     Decreased Fetal Movement    - Reported in Baby A, on maternal Left    - Since 0600   - cEFM/TOCO: cat 1 x2, no contractions    - Will need BPP following BP monitoring     FGR ( BABY A and B)    - Baby A (AC<10th%)    - Baby B (AC, 10th%)    - Following with MFM    - Most recent MFM scan with normal UADs   - Plan for delivery at 36w0d - 37w6d    Velamentous Cord, Baby B    - Following with MFM     ICP    - Bile acids 11   - Denies itching at this time    - Urodiol 300 mg qd     S/P Celestone x 2   -  and     Anxiety/Depression    - Currently managed without medication    - Denies SI/HI    GERD    BMI 26    Patient Active Problem List    Diagnosis Date Noted    Celestone x  & 2023     Priority: Medium    Intrahepatic cholestasis of pregnancy in third trimester 2023     Priority: Medium    Needs flu shot 2022     Priority: Medium    High risk pregnancy, antepartum 08/12/2022     Priority: Medium    IVF pregnancy 08/12/2022     Priority: Medium     [place] in [address] diamniotic twin pregnancy  07/05/2022     Priority: Medium    Female infertility 06/06/2021     Priority: Medium    FGR Baby A and B  01/05/2023     Baby A ( AC <10th%)  Baby B ( AC<10th%)      Recurrent candidiasis of vagina 04/02/2019    Gastroesophageal reflux disease 04/02/2019    Acute recurrent maxillary sinusitis 12/06/2016    Seasonal allergic rhinitis 12/06/2016    Anxiety and depression 01/03/2012     No meds currently      Environmental allergies        Plan discussed with Dr. Vicente Garcia, who is agreeable. Steroids given this admission: No    Risks, benefits, alternatives and possible complications have been discussed in detail with the patient. Admission, and post admission procedures and expectations were discussed in detail. All questions were answered.     Attending's Name: Dr. Matthias Ramirez MD  Ob/Gyn Resident  1/24/2023, 5:09 PM&

## 2023-01-24 NOTE — FLOWSHEET NOTE
Patient admitted to Triage 2 from registration ambulatory. Here for decreased fetal movement with twin A per patient. Denies ROM or vaginal bleeding. Denies N/V/D. Denies fever/chills. Patient states that she did have a headache last night and this morning but is now resolved. Assisted into bed, Siderails up x2. Call light in reach. Bed in low position. Oriented to room, surroundings, call system and plan of care. Patient verbalizes understanding. EFM applied and explained to patient.

## 2023-01-24 NOTE — PROGRESS NOTES
Resident Interval Note    Ultrasonography conducted due to complaints of decreased fetal movement in baby A. Results of BPPs are as follows: Baby A: Fetal Tone present, Fetal movement present, Fetal heart tones present, Fetal breathing movements present. Decreased amniotic fluid noted on ultrasound with < 2x2 cm MVP. BPP 6/8 overall    Baby B: Fetal Tone present, Fetal movement present, Fetal heart tones present, Fetal breathing movements present. Amniotic fluid levels adequate with > 2x2 cm MVP. BPP 8/8 overall    Patient was notified of results, and ultrasound findings confirmed by senior resident on service. Plan is to proceed with  Delivery at this time. Written consent was obtained and placed in chart.      Teagan Urbano MD  Obstetrics & Gynecology Resident, PGY-1   [place], [place]  2023 6:26 PM

## 2023-01-25 LAB
BILE ACIDS TOTAL: 26 UMOL/L (ref 0–10)
FIBRINOGEN: 264 MG/DL (ref 140–420)
INR BLD: 0.9
PARTIAL THROMBOPLASTIN TIME: 23.9 SEC (ref 20.5–30.5)
PROTHROMBIN TIME: 10.2 SEC (ref 9.1–12.3)

## 2023-01-25 PROCEDURE — 85610 PROTHROMBIN TIME: CPT

## 2023-01-25 PROCEDURE — 2500000003 HC RX 250 WO HCPCS: Performed by: STUDENT IN AN ORGANIZED HEALTH CARE EDUCATION/TRAINING PROGRAM

## 2023-01-25 PROCEDURE — 85730 THROMBOPLASTIN TIME PARTIAL: CPT

## 2023-01-25 PROCEDURE — 3609079900 HC CESAREAN SECTION: Performed by: OBSTETRICS & GYNECOLOGY

## 2023-01-25 PROCEDURE — 2500000003 HC RX 250 WO HCPCS: Performed by: NURSE ANESTHETIST, CERTIFIED REGISTERED

## 2023-01-25 PROCEDURE — 7100000001 HC PACU RECOVERY - ADDTL 15 MIN: Performed by: OBSTETRICS & GYNECOLOGY

## 2023-01-25 PROCEDURE — 1220000000 HC SEMI PRIVATE OB R&B

## 2023-01-25 PROCEDURE — 3700000001 HC ADD 15 MINUTES (ANESTHESIA): Performed by: OBSTETRICS & GYNECOLOGY

## 2023-01-25 PROCEDURE — 6370000000 HC RX 637 (ALT 250 FOR IP): Performed by: STUDENT IN AN ORGANIZED HEALTH CARE EDUCATION/TRAINING PROGRAM

## 2023-01-25 PROCEDURE — 7100000000 HC PACU RECOVERY - FIRST 15 MIN: Performed by: OBSTETRICS & GYNECOLOGY

## 2023-01-25 PROCEDURE — 6360000002 HC RX W HCPCS: Performed by: NURSE ANESTHETIST, CERTIFIED REGISTERED

## 2023-01-25 PROCEDURE — A4216 STERILE WATER/SALINE, 10 ML: HCPCS | Performed by: STUDENT IN AN ORGANIZED HEALTH CARE EDUCATION/TRAINING PROGRAM

## 2023-01-25 PROCEDURE — 2580000003 HC RX 258: Performed by: STUDENT IN AN ORGANIZED HEALTH CARE EDUCATION/TRAINING PROGRAM

## 2023-01-25 PROCEDURE — 2500000003 HC RX 250 WO HCPCS: Performed by: ANESTHESIOLOGY

## 2023-01-25 PROCEDURE — 6360000002 HC RX W HCPCS: Performed by: STUDENT IN AN ORGANIZED HEALTH CARE EDUCATION/TRAINING PROGRAM

## 2023-01-25 PROCEDURE — 2709999900 HC NON-CHARGEABLE SUPPLY: Performed by: OBSTETRICS & GYNECOLOGY

## 2023-01-25 PROCEDURE — 85384 FIBRINOGEN ACTIVITY: CPT

## 2023-01-25 PROCEDURE — 6360000002 HC RX W HCPCS: Performed by: ANESTHESIOLOGY

## 2023-01-25 PROCEDURE — 3700000000 HC ANESTHESIA ATTENDED CARE: Performed by: OBSTETRICS & GYNECOLOGY

## 2023-01-25 PROCEDURE — 59514 CESAREAN DELIVERY ONLY: CPT | Performed by: OBSTETRICS & GYNECOLOGY

## 2023-01-25 PROCEDURE — 36415 COLL VENOUS BLD VENIPUNCTURE: CPT

## 2023-01-25 PROCEDURE — 2580000003 HC RX 258: Performed by: NURSE ANESTHETIST, CERTIFIED REGISTERED

## 2023-01-25 RX ORDER — BISACODYL 10 MG
10 SUPPOSITORY, RECTAL RECTAL DAILY PRN
Status: DISCONTINUED | OUTPATIENT
Start: 2023-01-25 | End: 2023-01-29 | Stop reason: HOSPADM

## 2023-01-25 RX ORDER — SIMETHICONE 80 MG
80 TABLET,CHEWABLE ORAL EVERY 6 HOURS PRN
Status: DISCONTINUED | OUTPATIENT
Start: 2023-01-25 | End: 2023-01-29 | Stop reason: HOSPADM

## 2023-01-25 RX ORDER — KETOROLAC TROMETHAMINE 30 MG/ML
30 INJECTION, SOLUTION INTRAMUSCULAR; INTRAVENOUS EVERY 6 HOURS
Status: DISPENSED | OUTPATIENT
Start: 2023-01-25 | End: 2023-01-26

## 2023-01-25 RX ORDER — ONDANSETRON 2 MG/ML
4 INJECTION INTRAMUSCULAR; INTRAVENOUS EVERY 6 HOURS PRN
Status: DISCONTINUED | OUTPATIENT
Start: 2023-01-25 | End: 2023-01-29 | Stop reason: HOSPADM

## 2023-01-25 RX ORDER — DIPHENHYDRAMINE HYDROCHLORIDE 50 MG/ML
25 INJECTION INTRAMUSCULAR; INTRAVENOUS EVERY 6 HOURS PRN
Status: DISCONTINUED | OUTPATIENT
Start: 2023-01-25 | End: 2023-01-29 | Stop reason: HOSPADM

## 2023-01-25 RX ORDER — LANOLIN 72 %
OINTMENT (GRAM) TOPICAL
Status: DISCONTINUED | OUTPATIENT
Start: 2023-01-25 | End: 2023-01-29 | Stop reason: HOSPADM

## 2023-01-25 RX ORDER — OXYCODONE HYDROCHLORIDE 5 MG/1
TABLET ORAL
Status: DISPENSED
Start: 2023-01-25 | End: 2023-01-26

## 2023-01-25 RX ORDER — VITAMIN A, ASCORBIC ACID, CHOLECALCIFEROL, .ALPHA.-TOCOPHEROL ACETATE, DL-, THIAMINE MONONITRATE, RIBOFLAVIN, NIACINAMIDE, PYRIDOXINE HYDROCHLORIDE, FOLIC ACID, CYANOCOBALAMIN, CALCIUM CARBONATE, IRON, ZINC OXIDE, AND CUPRIC OXIDE 4000; 120; 400; 22; 1.84; 3; 20; 10; 1; 12; 200; 29; 25; 2 [IU]/1; MG/1; [IU]/1; [IU]/1; MG/1; MG/1; MG/1; MG/1; MG/1; UG/1; MG/1; MG/1; MG/1; MG/1
1 TABLET ORAL DAILY
Status: DISCONTINUED | OUTPATIENT
Start: 2023-01-25 | End: 2023-01-29 | Stop reason: HOSPADM

## 2023-01-25 RX ORDER — MORPHINE SULFATE 1 MG/ML
INJECTION, SOLUTION EPIDURAL; INTRATHECAL; INTRAVENOUS
Status: COMPLETED | OUTPATIENT
Start: 2023-01-25 | End: 2023-01-25

## 2023-01-25 RX ORDER — TRANEXAMIC ACID 100 MG/ML
INJECTION, SOLUTION INTRAVENOUS PRN
Status: DISCONTINUED | OUTPATIENT
Start: 2023-01-25 | End: 2023-01-25 | Stop reason: SDUPTHER

## 2023-01-25 RX ORDER — ACETAMINOPHEN 500 MG
TABLET ORAL
Status: DISPENSED
Start: 2023-01-25 | End: 2023-01-26

## 2023-01-25 RX ORDER — POLYETHYLENE GLYCOL 3350 17 G/17G
17 POWDER, FOR SOLUTION ORAL DAILY
Status: DISCONTINUED | OUTPATIENT
Start: 2023-01-25 | End: 2023-01-29 | Stop reason: HOSPADM

## 2023-01-25 RX ORDER — ACETAMINOPHEN 500 MG
1000 TABLET ORAL EVERY 6 HOURS SCHEDULED
Status: DISCONTINUED | OUTPATIENT
Start: 2023-01-25 | End: 2023-01-29 | Stop reason: HOSPADM

## 2023-01-25 RX ORDER — SODIUM CHLORIDE 0.9 % (FLUSH) 0.9 %
5-40 SYRINGE (ML) INJECTION PRN
Status: DISCONTINUED | OUTPATIENT
Start: 2023-01-25 | End: 2023-01-29 | Stop reason: HOSPADM

## 2023-01-25 RX ORDER — SODIUM CHLORIDE, SODIUM LACTATE, POTASSIUM CHLORIDE, CALCIUM CHLORIDE 600; 310; 30; 20 MG/100ML; MG/100ML; MG/100ML; MG/100ML
INJECTION, SOLUTION INTRAVENOUS CONTINUOUS PRN
Status: DISCONTINUED | OUTPATIENT
Start: 2023-01-25 | End: 2023-01-25 | Stop reason: SDUPTHER

## 2023-01-25 RX ORDER — OXYCODONE HYDROCHLORIDE 5 MG/1
10 TABLET ORAL EVERY 4 HOURS PRN
Status: DISCONTINUED | OUTPATIENT
Start: 2023-01-25 | End: 2023-01-29 | Stop reason: HOSPADM

## 2023-01-25 RX ORDER — DIPHENHYDRAMINE HYDROCHLORIDE 50 MG/ML
INJECTION INTRAMUSCULAR; INTRAVENOUS PRN
Status: DISCONTINUED | OUTPATIENT
Start: 2023-01-25 | End: 2023-01-25 | Stop reason: SDUPTHER

## 2023-01-25 RX ORDER — SODIUM CHLORIDE 0.9 % (FLUSH) 0.9 %
5-40 SYRINGE (ML) INJECTION EVERY 12 HOURS SCHEDULED
Status: DISCONTINUED | OUTPATIENT
Start: 2023-01-25 | End: 2023-01-29 | Stop reason: HOSPADM

## 2023-01-25 RX ORDER — KETOROLAC TROMETHAMINE 30 MG/ML
INJECTION, SOLUTION INTRAMUSCULAR; INTRAVENOUS PRN
Status: DISCONTINUED | OUTPATIENT
Start: 2023-01-25 | End: 2023-01-25 | Stop reason: SDUPTHER

## 2023-01-25 RX ORDER — SODIUM CHLORIDE, SODIUM LACTATE, POTASSIUM CHLORIDE, CALCIUM CHLORIDE 600; 310; 30; 20 MG/100ML; MG/100ML; MG/100ML; MG/100ML
INJECTION, SOLUTION INTRAVENOUS CONTINUOUS
Status: DISCONTINUED | OUTPATIENT
Start: 2023-01-25 | End: 2023-01-25

## 2023-01-25 RX ORDER — ACETAMINOPHEN 500 MG
1000 TABLET ORAL EVERY 4 HOURS PRN
Status: DISCONTINUED | OUTPATIENT
Start: 2023-01-25 | End: 2023-01-25

## 2023-01-25 RX ORDER — NALOXONE HYDROCHLORIDE 0.4 MG/ML
0.4 INJECTION, SOLUTION INTRAMUSCULAR; INTRAVENOUS; SUBCUTANEOUS PRN
Status: DISCONTINUED | OUTPATIENT
Start: 2023-01-25 | End: 2023-01-29 | Stop reason: HOSPADM

## 2023-01-25 RX ORDER — SODIUM CHLORIDE 9 MG/ML
INJECTION, SOLUTION INTRAVENOUS PRN
Status: DISCONTINUED | OUTPATIENT
Start: 2023-01-25 | End: 2023-01-29 | Stop reason: HOSPADM

## 2023-01-25 RX ORDER — DEXAMETHASONE SODIUM PHOSPHATE 10 MG/ML
INJECTION, SOLUTION INTRAMUSCULAR; INTRAVENOUS PRN
Status: DISCONTINUED | OUTPATIENT
Start: 2023-01-25 | End: 2023-01-25 | Stop reason: SDUPTHER

## 2023-01-25 RX ORDER — IBUPROFEN 800 MG/1
800 TABLET ORAL EVERY 8 HOURS PRN
Status: DISCONTINUED | OUTPATIENT
Start: 2023-01-26 | End: 2023-01-29 | Stop reason: HOSPADM

## 2023-01-25 RX ORDER — BUPIVACAINE HYDROCHLORIDE 7.5 MG/ML
INJECTION, SOLUTION INTRASPINAL
Status: COMPLETED | OUTPATIENT
Start: 2023-01-25 | End: 2023-01-25

## 2023-01-25 RX ORDER — FENTANYL CITRATE 50 UG/ML
INJECTION, SOLUTION INTRAMUSCULAR; INTRAVENOUS
Status: COMPLETED | OUTPATIENT
Start: 2023-01-25 | End: 2023-01-25

## 2023-01-25 RX ORDER — DOCUSATE SODIUM 100 MG/1
100 CAPSULE, LIQUID FILLED ORAL 2 TIMES DAILY
Status: DISCONTINUED | OUTPATIENT
Start: 2023-01-25 | End: 2023-01-29 | Stop reason: HOSPADM

## 2023-01-25 RX ORDER — OXYCODONE HYDROCHLORIDE 5 MG/1
5 TABLET ORAL EVERY 4 HOURS PRN
Status: DISCONTINUED | OUTPATIENT
Start: 2023-01-25 | End: 2023-01-29 | Stop reason: HOSPADM

## 2023-01-25 RX ADMIN — OXYCODONE HYDROCHLORIDE 10 MG: 5 TABLET ORAL at 20:33

## 2023-01-25 RX ADMIN — TRANEXAMIC ACID 1000 MG: 100 INJECTION, SOLUTION INTRAVENOUS at 06:45

## 2023-01-25 RX ADMIN — PHENYLEPHRINE HYDROCHLORIDE 25 MCG/MIN: 10 INJECTION INTRAVENOUS at 06:25

## 2023-01-25 RX ADMIN — SODIUM CITRATE AND CITRIC ACID MONOHYDRATE 30 ML: 500; 334 SOLUTION ORAL at 05:34

## 2023-01-25 RX ADMIN — Medication 500 MG: at 06:29

## 2023-01-25 RX ADMIN — KETOROLAC TROMETHAMINE 30 MG: 30 INJECTION, SOLUTION INTRAMUSCULAR; INTRAVENOUS at 20:03

## 2023-01-25 RX ADMIN — SODIUM CHLORIDE, POTASSIUM CHLORIDE, SODIUM LACTATE AND CALCIUM CHLORIDE: 600; 310; 30; 20 INJECTION, SOLUTION INTRAVENOUS at 06:12

## 2023-01-25 RX ADMIN — ACETAMINOPHEN 1000 MG: 500 TABLET ORAL at 06:10

## 2023-01-25 RX ADMIN — FENTANYL CITRATE 15 MCG: 50 INJECTION, SOLUTION INTRAMUSCULAR; INTRAVENOUS at 06:23

## 2023-01-25 RX ADMIN — Medication 2000 MG: at 06:10

## 2023-01-25 RX ADMIN — Medication 909 ML/HR: at 06:42

## 2023-01-25 RX ADMIN — KETOROLAC TROMETHAMINE 30 MG: 30 INJECTION, SOLUTION INTRAMUSCULAR; INTRAVENOUS at 13:22

## 2023-01-25 RX ADMIN — BUPIVACAINE HYDROCHLORIDE IN DEXTROSE 15 MG: 7.5 INJECTION, SOLUTION SUBARACHNOID at 06:23

## 2023-01-25 RX ADMIN — Medication 3 MG: at 00:29

## 2023-01-25 RX ADMIN — ACETAMINOPHEN 1000 MG: 500 TABLET ORAL at 12:18

## 2023-01-25 RX ADMIN — FAMOTIDINE 20 MG: 10 INJECTION, SOLUTION INTRAVENOUS at 05:34

## 2023-01-25 RX ADMIN — DEXAMETHASONE SODIUM PHOSPHATE 10 MG: 10 INJECTION INTRAMUSCULAR; INTRAVENOUS at 06:39

## 2023-01-25 RX ADMIN — ACETAMINOPHEN 1000 MG: 500 TABLET ORAL at 18:50

## 2023-01-25 RX ADMIN — PHENYLEPHRINE HYDROCHLORIDE 100 MCG: 10 INJECTION INTRAVENOUS at 06:25

## 2023-01-25 RX ADMIN — DIPHENHYDRAMINE HYDROCHLORIDE 12.5 MG: 50 INJECTION, SOLUTION INTRAMUSCULAR; INTRAVENOUS at 06:39

## 2023-01-25 RX ADMIN — MORPHINE SULFATE 0.2 MG: 1 INJECTION, SOLUTION EPIDURAL; INTRATHECAL; INTRAVENOUS at 06:23

## 2023-01-25 RX ADMIN — SODIUM CHLORIDE, POTASSIUM CHLORIDE, SODIUM LACTATE AND CALCIUM CHLORIDE: 600; 310; 30; 20 INJECTION, SOLUTION INTRAVENOUS at 07:36

## 2023-01-25 RX ADMIN — DOCUSATE SODIUM 100 MG: 100 CAPSULE ORAL at 20:03

## 2023-01-25 RX ADMIN — KETOROLAC TROMETHAMINE 30 MG: 30 INJECTION, SOLUTION INTRAMUSCULAR; INTRAVENOUS at 06:57

## 2023-01-25 RX ADMIN — ONDANSETRON 4 MG: 2 INJECTION INTRAMUSCULAR; INTRAVENOUS at 06:09

## 2023-01-25 ASSESSMENT — PAIN SCALES - GENERAL
PAINLEVEL_OUTOF10: 3
PAINLEVEL_OUTOF10: 2
PAINLEVEL_OUTOF10: 4

## 2023-01-25 ASSESSMENT — PAIN DESCRIPTION - DESCRIPTORS
DESCRIPTORS: SORE
DESCRIPTORS: ACHING;SHOOTING
DESCRIPTORS: SORE;THROBBING;SHARP

## 2023-01-25 ASSESSMENT — PAIN DESCRIPTION - LOCATION
LOCATION: ABDOMEN

## 2023-01-25 ASSESSMENT — PAIN DESCRIPTION - ORIENTATION
ORIENTATION: LOWER

## 2023-01-25 ASSESSMENT — LIFESTYLE VARIABLES: SMOKING_STATUS: 0

## 2023-01-25 NOTE — L&D DELIVERY NOTE
Mother's Information      Labor Events     Labor?: No  Induction: None  Augmentation: None  Cervical Ripening:   Now               Carolyn Huffman Brood [[id number]]      Labor Events     Labor?: No   Steroids?: Full Course  Cervical Ripening Date/Time:     Antibiotics Received during Labor?: No  Rupture Date/Time:     Rupture Type: AROM  Fluid Color: Clear  Fluid Odor: None  Fluid Volume: Scant  Induction: None  Augmentation: None  Labor Complications: Pre-eclampsia       Anesthesia    Method: Spinal       Start Pushing      Labor onset date/time:   Now     Dilation complete date/time:   Now     Start pushing date/time:    Decision date/time (emergent ):           Delivery ([place])      Delivery Date/Time:  23 06:40:00   Delivery Type: , Low Transverse    Details:  Trial of Labor?: No    Categorization: Primary    Priority: Non-scheduled   Indications for : Multiple Gestation   Skin Incision Type: Pfannenstiel     Uterine Incision: Low Transverse              Presentation    Presentation: Vertex  _: Occiput  _: Anterior       Shoulder Dystocia    Shoulder Dystocia Present?: No  Add Second Maneuver  Add Third Maneuver  Add Fourth Maneuver  Add Fifth Maneuver  Add Sixth Maneuver  Add Seventh Maneuver  Add Eighth Maneuver  Add Ninth Maneuver       Assisted Delivery Details    Forceps Attempted?: No  Vacuum Extractor Attempted?: No       Document Additional Attempt         Document Additional Attempt                 Cord    Vessels: 3 Vessels  Complications: None  Delayed Cord Clamping?: Yes  Cord Clamped Date/Time: 2023 06:41:00  Cord Blood Disposition: Lab  Gases Sent?: Yes       Placenta    Date/Time: 2023 06:43:00  Removal: Spontaneous  Appearance: Intact  Disposition: Pathology       Lacerations           Vaginal Counts        Sponges Needles Instruments   Initial Counts      Final Counts      If the count is incorrect due to Intentionally Retained Foreign Object (IRFO) add the IRFO LDA in Lines/Drains. Add LDA: Link to [facility]       Blood Loss  Mother: Rosaura Colin #[id number]     Start of Mother's Information      Delivery Blood Loss  23 0612 - 23 0720      None                 End of Mother's Information  Mother: Rosaura Colin #[id number]                Delivery Providers    Delivering clinician: Darline Herrera,      Provider Role    Darline Cords, DO Obstetrician     Primary Nurse     Primary  Nurse     NICU Nurse     Neonatologist     Anesthesiologist     Nurse Anesthetist     Nurse Practitioner     Midwife     Nursery Nurse    Rosalia Hernandes DO Chief Resident    Tami Pulse, MD Resident               Assessment    Living Status: Living  Delivery Location Comment: OR2     Apgar Scoring Key:    0 1 2    Skin Color: Blue or pale Acrocyanotic Completely pink    Heart Rate: Absent <100 bpm >100 bpm    Reflex Irritability: No response Grimace Cry or active withdrawal    Muscle Tone: Limp Some flexion Active motion    Respiratory Effort: Absent Weak cry; hypoventilation Good, crying                      Skin Color:   Heart Rate:   Reflex Irritability:   Muscle Tone:   Respiratory Effort:    Total:            1 Minute:    0    2    2    2    2    8        Apgar 1 total from OB History    5 Minute:    1    2    2    2    2    9        Apgar 5 total from OB History    10 Minute:              15 Minute:              20 Minute:                        Apgars Assigned By: NICU              Resuscitation               [place] Measurements               Title      Skin to Skin Initiation Date/Time:       Skin to Skin End Date/Time:                    Molly Murcia Leanne Mayers [facility] [[id number]]      Labor Events     Labor?: No   Steroids?: Full Course  Cervical Ripening Date/Time:       Rupture Date/Time:     Rupture Type: AROM  Fluid Color: Clear  Fluid Odor: None  Fluid Volume: Scant  Augmentation: None  Labor Complications: None       Anesthesia    Method: Spinal       Start Pushing      Labor onset date/time:   Now     Dilation complete date/time:   Now     Start pushing date/time:    Decision date/time (emergent ):           Delivery ([place])      Delivery Date/Time:  23 06:40:00   Delivery Type: , Low Transverse    Details:  Trial of Labor?: No    Categorization: Primary    Priority: Non-scheduled   Indications for : Multiple Gestation   Skin Incision Type: Pfannenstiel     Uterine Incision: Low Transverse             [place] Presentation    Presentation: Vertex  _: Occiput  _: Anterior       Shoulder Dystocia    Shoulder Dystocia Present?: No  Add Second Maneuver  Add Third Maneuver  Add Fourth Maneuver  Add Fifth Maneuver  Add Sixth Maneuver  Add Seventh Maneuver  Add Eighth Maneuver  Add Ninth Maneuver       Assisted Delivery Details    Forceps Attempted?: No  Vacuum Extractor Attempted?: No       Document Additional Attempt         Document Additional Attempt                 Cord    Vessels: 3 Vessels  Complications: None  Delayed Cord Clamping?: Yes  Cord Clamped Date/Time: 2023 06:41:00  Cord Blood Disposition: Lab  Gases Sent?: Yes       Placenta    Date/Time: 2023 06:43:00  Removal: Spontaneous  Appearance: Intact  Disposition: Pathology       Lacerations           Vaginal Counts        Sponges Needles Instruments   Initial Counts      Final Counts      If the count is incorrect due to Intentionally Retained Foreign Object (IRFO) add the IRFO LDA in Lines/Drains.   Add LDA: Link to [facility] [place]       Blood Loss  Mother: Julia Alejandra #[id number]     Start of Mother's Information      Delivery Blood Loss  23 0612 - 23 0720      None                 End of Mother's Information  Mother: Julia Alejandra #[id number]                Delivery Providers    Delivering clinician: José Antonio Peters      Provider Role    Connie Brooke DO Obstetrician     Primary Nurse     Primary  Nurse     NICU Nurse     Neonatologist     Anesthesiologist     Nurse Anesthetist     Nurse Practitioner     Midwife     Nursery Nurse    Candance Better, DO Chief Resident    Lorne Scheuermann, MD Resident               Assessment    Living Status: Living  Delivery Location Comment: OR2     Apgar Scoring Key:    0 1 2    Skin Color: Blue or pale Acrocyanotic Completely pink    Heart Rate: Absent <100 bpm >100 bpm    Reflex Irritability: No response Grimace Cry or active withdrawal    Muscle Tone: Limp Some flexion Active motion    Respiratory Effort: Absent Weak cry; hypoventilation Good, crying                      Skin Color:   Heart Rate:   Reflex Irritability:   Muscle Tone:   Respiratory Effort:    Total:            1 Minute:    0    2    2    2    2    8        Apgar 1 total from OB History    5 Minute:    1    2    2    2    2    9        Apgar 5 total from OB History    10 Minute:              15 Minute:              20 Minute:                        Apgars Assigned By: NICU              Resuscitation               [place] Measurements               Title      Skin to Skin Initiation Date/Time:       Skin to Skin End Date/Time:

## 2023-01-25 NOTE — ANESTHESIA PROCEDURE NOTES
Spinal Block    Patient location during procedure: OB  Reason for block: primary anesthetic and at surgeon's request  Staffing  Performed: resident/CRNA   Resident/CRNA: DENTNO Perales CRNA  Spinal Block  Patient position: sitting  Prep: Betadine  Patient monitoring: continuous pulse ox and frequent blood pressure checks  Approach: midline  Location: L3/L4  Guidance: paresthesia technique  Provider prep: mask and sterile gloves  Local infiltration: lidocaine  Needle  Needle type: Pencan   Needle gauge: 24 G  Needle length: 4 in  Assessment  Sensory level: T4  Swirl obtained: Yes  CSF: clear  Attempts: 1  Hemodynamics: stable  Preanesthetic Checklist  Completed: patient identified, IV checked, site marked, risks and benefits discussed, surgical/procedural consents, equipment checked, pre-op evaluation, timeout performed, anesthesia consent given, oxygen available, monitors applied/VS acknowledged, fire risk safety assessment completed and verbalized and blood product R/B/A discussed and consented

## 2023-01-25 NOTE — PROGRESS NOTES
Ob/Gyn Progress Note    Patient seen and evaluated. She states that her HA has gotten better with tylenol. Her last three pressures have all been normotensive. At this time she does not meet criteria for any hypertensive disorder of pregnancy. Will plan on scheduling  section at 8 AM unless there is a change in maternal or fetal status. Dr. Bui Lips updated and in agreement with plan.     Anni Salas DO, PGY-3  Ob/Gyn Resident  [facility]  23

## 2023-01-25 NOTE — PROGRESS NOTES
Education provided on  process and questions answered. Residents state patient may have vitals per unit protocol rather than every 30 min        : Renuka De La Cruz calls unit to let writer know that they are in cases elsewhere in the hospital and are unable to do c/s until closer to 2300, seeing about pushing c/s back until morning?  Torri Chakraborty is going to talk to Dr. Greg Granda and Dr. Hao Carrillo and talk to writer

## 2023-01-25 NOTE — PROGRESS NOTES
Dr. Pradeep Light states per Dr. Ne Cassidy that they would like q30 min BP. Pt c/o headache at this time, states it is frontal and rates it a 2/10. Tylenol given, RN continues to monitor.

## 2023-01-25 NOTE — FLOWSHEET NOTE
Patient admitted to room 746 from L&D via bed. Oriented to room and surroundings. Plan of care reviewed. Verbalized understanding. Instructed on infant security and safe sleep practices. Preventing falls education provided . The following handouts given: A New Beginning: Your Guide to Postpartum Care, Rounding, gs Security System,Babies Cry A lot, Safe Sleep, Security and Visitation Guidelines. Call light placed within reach.

## 2023-01-25 NOTE — ANESTHESIA POSTPROCEDURE EVALUATION
Department of Anesthesiology  Postprocedure Note    Patient: Ronna Culp  MRN: [id number]  YOB: 1990  Date of evaluation: 2023      Procedure Summary     Date: 23 Room / Location: [address]    Anesthesia Start: 612 Anesthesia Stop: 732    Procedure:  SECTION Diagnosis:       Dichorionic diamniotic twin pregnancy in third trimester      (35.4 wk twins for primary c/s)    Surgeons: Brian Sweeney DO Responsible Provider: Edd Vaughn MD    Anesthesia Type: spinal ASA Status: Not recorded          Anesthesia Type: No value filed.     Colleen Phase I:      Colleen Phase II:        Anesthesia Post Evaluation    Patient location during evaluation: PACU  Patient participation: complete - patient participated  Level of consciousness: awake and alert  Pain score: 0  Airway patency: patent  Nausea & Vomiting: no vomiting and no nausea  Complications: no  Cardiovascular status: blood pressure returned to baseline  Respiratory status: acceptable  Hydration status: euvolemic

## 2023-01-25 NOTE — FLOWSHEET NOTE
RN got pt out of bed. Found to be sitting in a chucks pad saturated in blood. Dripping blood seen from epidural site. Resident RODOLFO Reid to evaluate. Pressure dressing applied per anaesthesia recommendation.

## 2023-01-25 NOTE — PROGRESS NOTES
Update History & Physical- (Obstetrics)      Patient Name: Martha Hernandez  Patient : 1990  Room/Bed: REC1/REC1-01  Admission Date/Time: 2023  3:45 PM  Primary Care Physician: Ryan Hay MD  MRN #: [id number]  [facility] #: [id number]        Date: 2023  Time: 5:00 AM      The patient's History and Physical was reviewed with the patient and there were no significant changes. I examined the patient and there were no significant changes from the previous History and Physical.    Plan: The risk, benefits, expected outcome, and alternative to the recommended procedure have been discussed with the patient. Patient understands and wants to proceed with the procedure. She is aware that there may be a need for a second procedure and there may be incomplete removal of any abnormal tissue. She was also counseled on the possibility of Bleeding, Infection, possible damage to bowel, bladder, baby, vasculature and surrounding organs. The labs and consent were reviewed prior to the patient going to the Operating Room. /150 bpm Baseline  Variability- moderate x 2   Category Tracing- 1 x 2    Pt seen and care plan reviewed. Pt counseled on options of inpatient management until 36 weeks with  at 36 weeks vs proceeding with  today.  was previously scheduled last pm due to above findings with elevated BP & H/A but delayed due to anesthesia. Per anesthesia ok to proceed now. Pt wishes to c/w plan for  at this time. Risks/ benefits/ alternative options reviewed with pt. Questions answered. Will proceed.        Vitals:    23 2159 23 2200 23 2225 23 2300   BP: 120/75 120/75 126/74 121/82   Pulse: 77  73 68   Resp: 16 16 18 16   Temp:  98.3 °F (36.8 °C) 98.7 °F (37.1 °C)    TempSrc:  Oral Oral    SpO2:         PE-per resident note-Unchanged    Patient Active Problem List    Diagnosis Date Noted    Celestone x  & 2023     Priority: Medium    Intrahepatic cholestasis of pregnancy in third trimester 01/19/2023     Priority: Medium    IVF pregnancy 08/12/2022     Priority: Medium     Overview Note:     [place] in [address] [address] diamniotic twin pregnancy  07/05/2022     Priority: Medium    FGR Baby A and B  01/05/2023     Overview Note:     Baby A ( AC <10th%)  Baby B ( AC<10th%)      Gastroesophageal reflux disease 04/02/2019    Anxiety and depression 01/03/2012     Overview Note:     No meds currently           Lab Results:  Admission on 01/24/2023   Component Date Value Ref Range Status    WBC 01/24/2023 10.4  3.5 - 11.3 k/uL Final    RBC 01/24/2023 3.63 (A)  3.95 - 5.11 m/uL Final    Hemoglobin 01/24/2023 11.0 (A)  11.9 - 15.1 g/dL Final    Hematocrit 01/24/2023 33.7 (A)  36.3 - 47.1 % Final    MCV 01/24/2023 92.8  82.6 - 102.9 fL Final    MCH 01/24/2023 30.3  25.2 - 33.5 pg Final    MCHC 01/24/2023 32.6  28.4 - 34.8 g/dL Final    RDW 01/24/2023 12.5  11.8 - 14.4 % Final    Platelets 07/41/8395 150  138 - 453 k/uL Final    MPV 01/24/2023 12.8  8.1 - 13.5 fL Final    NRBC Automated 01/24/2023 0.0  0.0 per 100 WBC Final    Seg Neutrophils 01/24/2023 69 (A)  36 - 65 % Final    Lymphocytes 01/24/2023 19 (A)  24 - 43 % Final    Monocytes 01/24/2023 9  3 - 12 % Final    Eosinophils % 01/24/2023 2  1 - 4 % Final    Basophils 01/24/2023 0  0 - 2 % Final    Immature Granulocytes 01/24/2023 1 (A)  0 % Final    Segs Absolute 01/24/2023 7.20  1.50 - 8.10 k/uL Final    Absolute Lymph # 01/24/2023 2.03  1.10 - 3.70 k/uL Final    Absolute Mono # 01/24/2023 0.93  0.10 - 1.20 k/uL Final    Absolute Eos # 01/24/2023 0.17  0.00 - 0.44 k/uL Final    Basophils Absolute 01/24/2023 0.04  0.00 - 0.20 k/uL Final    Absolute Immature Granulocyte 01/24/2023 0.07  0.00 - 0.30 k/uL Final    Glucose 01/24/2023 95  70 - 99 mg/dL Final    BUN 01/24/2023 14  6 - 20 mg/dL Final    Creatinine 01/24/2023 0.62  0.50 - 0.90 mg/dL Final    Est, Glom Filt Rate 01/24/2023 >60  >60 mL/min/1.73m2 Final    Comment:       Effective Oct 3, 2022        These results are not intended for use in patients <25years of age. eGFR results are calculated without a race factor using the 2021 CKD-EPI equation. Careful clinical correlation is recommended, particularly when comparing to results   calculated using previous equations. The CKD-EPI equation is less accurate in patients with extremes of muscle mass, extra-renal   metabolism of creatine, excessive creatine ingestion, or following therapy that affects   renal tubular secretion. Calcium 01/24/2023 8.9  8.6 - 10.4 mg/dL Final    Sodium 01/24/2023 135  135 - 144 mmol/L Final    Potassium 01/24/2023 3.8  3.7 - 5.3 mmol/L Final    Chloride 01/24/2023 102  98 - 107 mmol/L Final    CO2 01/24/2023 20  20 - 31 mmol/L Final    Anion Gap 01/24/2023 13  9 - 17 mmol/L Final    Alkaline Phosphatase 01/24/2023 127 (A)  35 - 104 U/L Final    ALT 01/24/2023 10  5 - 33 U/L Final    AST 01/24/2023 15  <32 U/L Final    Total Bilirubin 01/24/2023 0.2 (A)  0.3 - 1.2 mg/dL Final    Total Protein 01/24/2023 5.9 (A)  6.4 - 8.3 g/dL Final    Albumin 01/24/2023 3.0 (A)  3.5 - 5.2 g/dL Final    Albumin/Globulin Ratio 01/24/2023 1.0  1.0 - 2.5 Final    Total Protein, Urine 01/24/2023 136  mg/dL Final    No normal range established. Creatinine, Ur 01/24/2023 101.1  28.0 - 217.0 mg/dL Final    Urine Total Protein Creatinine Rat* 01/24/2023 1.35 (A)  0.00 - 0.20 Final    T. pallidum, IgG 01/24/2023 NONREACTIVE  NONREACTIVE Final    Comment:       T. pallidum antibodies are not detected. There is no serological evidence of infection with T. pallidum (early primary syphilis   cannot be excluded). Retest in 2-4 weeks if syphilis is clinically suspect.             Expiration Date 01/24/2023 01/27/2023,2359   Final    Arm Band Number 01/24/2023 BE [id number]   Final    ABO/Rh 01/24/2023 A POSITIVE   Final    Antibody Screen 01/24/2023 NEGATIVE   Final Hospital Outpatient Visit on 01/17/2023   Component Date Value Ref Range Status    Glucose 01/17/2023 61 (A)  70 - 99 mg/dL Final    BUN 01/17/2023 10  6 - 20 mg/dL Final    Creatinine 01/17/2023 0.63  0.50 - 0.90 mg/dL Final    Est, Glom Filt Rate 01/17/2023 >60  >60 mL/min/1.73m2 Final    Comment:       Effective Oct 3, 2022        These results are not intended for use in patients <25years of age. eGFR results are calculated without a race factor using the 2021 CKD-EPI equation. Careful clinical correlation is recommended, particularly when comparing to results   calculated using previous equations. The CKD-EPI equation is less accurate in patients with extremes of muscle mass, extra-renal   metabolism of creatine, excessive creatine ingestion, or following therapy that affects   renal tubular secretion. Calcium 01/17/2023 8.9  8.6 - 10.4 mg/dL Final    Sodium 01/17/2023 137  135 - 144 mmol/L Final    Potassium 01/17/2023 4.7  3.7 - 5.3 mmol/L Final    Chloride 01/17/2023 104  98 - 107 mmol/L Final    CO2 01/17/2023 20  20 - 31 mmol/L Final    Anion Gap 01/17/2023 13  9 - 17 mmol/L Final    Alkaline Phosphatase 01/17/2023 130 (A)  35 - 104 U/L Final    ALT 01/17/2023 13  5 - 33 U/L Final    AST 01/17/2023 21  <32 U/L Final    Total Bilirubin 01/17/2023 0.3  0.3 - 1.2 mg/dL Final    Total Protein 01/17/2023 6.3 (A)  6.4 - 8.3 g/dL Final    Albumin 01/17/2023 3.3 (A)  3.5 - 5.2 g/dL Final    Albumin/Globulin Ratio 01/17/2023 1.1  1.0 - 2.5 Final    Bile Acids Total 01/17/2023 11 (A)  0 - 10 umol/L Final    Comment: (NOTE)  INTERPRETIVE INFORMATION: Bile Acids, Total  Reference Interval applies to fasting specimens.   Performed By: Enrique Reeder Jr. [facility] [address], [address] [facility]  : Kay Forman MD, PhD     Admission on 01/08/2023, Discharged on 01/08/2023   Component Date Value Ref Range Status    WBC 01/08/2023 10.7  3.5 - 11.3 k/uL Final    RBC 01/08/2023 3.74 (A)  3.95 - 5.11 m/uL Final    Hemoglobin 01/08/2023 11.5 (A)  11.9 - 15.1 g/dL Final    Hematocrit 01/08/2023 33.7 (A)  36.3 - 47.1 % Final    MCV 01/08/2023 90.1  82.6 - 102.9 fL Final    MCH 01/08/2023 30.7  25.2 - 33.5 pg Final    MCHC 01/08/2023 34.1  28.4 - 34.8 g/dL Final    RDW 01/08/2023 12.3  11.8 - 14.4 % Final    Platelets 58/71/1142 156  138 - 453 k/uL Final    MPV 01/08/2023 12.7  8.1 - 13.5 fL Final    NRBC Automated 01/08/2023 0.0  0.0 per 100 WBC Final    Seg Neutrophils 01/08/2023 72 (A)  36 - 65 % Final    Lymphocytes 01/08/2023 16 (A)  24 - 43 % Final    Monocytes 01/08/2023 9  3 - 12 % Final    Eosinophils % 01/08/2023 2  1 - 4 % Final    Basophils 01/08/2023 0  0 - 2 % Final    Immature Granulocytes 01/08/2023 1 (A)  0 % Final    Segs Absolute 01/08/2023 7.70  1.50 - 8.10 k/uL Final    Absolute Lymph # 01/08/2023 1.71  1.10 - 3.70 k/uL Final    Absolute Mono # 01/08/2023 1.00  0.10 - 1.20 k/uL Final    Absolute Eos # 01/08/2023 0.22  0.00 - 0.44 k/uL Final    Basophils Absolute 01/08/2023 0.04  0.00 - 0.20 k/uL Final    Absolute Immature Granulocyte 01/08/2023 0.07  0.00 - 0.30 k/uL Final    Glucose 01/08/2023 98  70 - 99 mg/dL Final    BUN 01/08/2023 11  6 - 20 mg/dL Final    Creatinine 01/08/2023 0.64  0.50 - 0.90 mg/dL Final    Est, Glom Filt Rate 01/08/2023 >60  >60 mL/min/1.73m2 Final    Comment:       Effective Oct 3, 2022        These results are not intended for use in patients <25years of age. eGFR results are calculated without a race factor using the 2021 CKD-EPI equation. Careful clinical correlation is recommended, particularly when comparing to results   calculated using previous equations. The CKD-EPI equation is less accurate in patients with extremes of muscle mass, extra-renal   metabolism of creatine, excessive creatine ingestion, or following therapy that affects   renal tubular secretion.       Calcium 01/08/2023 8.9  8.6 - 10.4 mg/dL Final    Sodium 01/08/2023 132 (A)  135 - 144 mmol/L Final    Potassium 01/08/2023 3.7  3.7 - 5.3 mmol/L Final    Chloride 01/08/2023 101  98 - 107 mmol/L Final    CO2 01/08/2023 21  20 - 31 mmol/L Final    Anion Gap 01/08/2023 10  9 - 17 mmol/L Final    Alkaline Phosphatase 01/08/2023 123 (A)  35 - 104 U/L Final    ALT 01/08/2023 14  5 - 33 U/L Final    AST 01/08/2023 19  <32 U/L Final    Total Bilirubin 01/08/2023 0.2 (A)  0.3 - 1.2 mg/dL Final    Total Protein 01/08/2023 6.4  6.4 - 8.3 g/dL Final    Albumin 01/08/2023 3.4 (A)  3.5 - 5.2 g/dL Final    Albumin/Globulin Ratio 01/08/2023 1.1  1.0 - 2.5 Final    Specimen Description 01/08/2023 . NASOPHARYNGEAL SWAB   Final    SARS-CoV-2, Rapid 01/08/2023 Not Detected  Not Detected Final    Comment:       Rapid NAAT:  The specimen is NEGATIVE for SARS-CoV-2, the novel coronavirus associated with   COVID-19. The ID NOW COVID-19 assay is designed to detect the virus that causes COVID-19 in patients   with signs and symptoms of infection who are suspected of COVID-19. An individual without symptoms of COVID-19 and who is not shedding SARS-CoV-2 virus would   expect to have a negative (not detected) result in this assay. Negative results should be treated as presumptive and, if inconsistent with clinical signs   and symptoms or necessary for patient management,  should be tested with an alternative molecular assay. Negative results do not preclude   SARS-CoV-2 infection and   should not be used as the sole basis for patient management decisions. Fact sheet for Healthcare Providers: http://www.ashlyn-michelle.cristopher/  Fact sheet for Patients: http://www.ashlyn-michelle.bihalie/        Methodology: Isothermal Nucleic Acid Amplification      Flu A Antigen 01/08/2023 NEGATIVE  NEGATIVE Final    for Influenza A Antigen    Flu B Antigen 01/08/2023 NEGATIVE  NEGATIVE Final    for Influenza B Antigen.     Ventricular Rate 01/08/2023 95  BPM Final    Atrial Rate 01/08/2023 95  BPM Final    P-R Interval 01/08/2023 116  ms Final    QRS Duration 01/08/2023 80  ms Final    Q-T Interval 01/08/2023 354  ms Final    QTc Calculation (Bazett) 01/08/2023 444  ms Final    P Axis 01/08/2023 45  degrees Final    R Axis 01/08/2023 42  degrees Final    T [place] 01/08/2023 60  degrees Final    Magnesium 01/08/2023 1.8  1.6 - 2.6 mg/dL Final    Color, UA 01/08/2023 Yellow  Yellow Final    Turbidity UA 01/08/2023 Cloudy (A)  Clear Final    Glucose, Ur 01/08/2023 NEGATIVE  NEGATIVE Final    Bilirubin Urine 01/08/2023 NEGATIVE  NEGATIVE Final    Ketones, Urine 01/08/2023 NEGATIVE  NEGATIVE Final    Specific Gravity, UA 01/08/2023 1.010  1.005 - 1.030 Final    Urine Hgb 01/08/2023 SMALL (A)  NEGATIVE Final    pH, UA 01/08/2023 6.5  5.0 - 8.0 Final    Protein, UA 01/08/2023 TRACE (A)  NEGATIVE Final    Urobilinogen, Urine 01/08/2023 Normal  Normal Final    Nitrite, Urine 01/08/2023 NEGATIVE  NEGATIVE Final    Leukocyte Esterase, Urine 01/08/2023 NEGATIVE  NEGATIVE Final    WBC, UA 01/08/2023 5 TO 10  0 - 5 /HPF Final    RBC, UA 01/08/2023 2 TO 5  0 - 4 /HPF Final    Reference range defined for non-centrifuged specimen. Casts UA 01/08/2023 10 TO 20 HYALINE Reference range defined for non-centrifuged specimen. 0 - 8 /LPF Final    Epithelial Cells UA 01/08/2023 10 TO 20  0 - 5 /HPF Final    Bacteria, UA 01/08/2023 MODERATE (A)  None Final    Specimen Description 01/08/2023 . CLEAN CATCH URINE   Final    Culture 01/08/2023 NO SIGNIFICANT GROWTH   Final    D-Dimer, Quant 01/08/2023 3.66  mg/L FEU Final    Comment:        When combined with a low clinical probability, a D dimer value of <0.50 mg/L FEU is   considered negative for DVT and PE (negative predictive value of 98%, sensitivity of 97%). If this test is not being used to help rule out DVT and PE, then the following reference   range should be utilized: 0.00 - 1.02 mg/L FEU.         The [employer] D-Dimer assay is intended for use as an aid in the diagnosis of venous   thromboembolism (DVT and PE) and the results should be interpreted in conjunction with the   patient's medical history, clinical presentation, and other findings. Elevated levels of D-dimer activity can be seen in any state of coagulation activation and   is not recommended in patients with therapeutic dose anticoagulant therapy for >24 hours,   fibrinolytic therapy within the previous 7 days, trauma or surgery within the previous 4   weeks,   disseminated malignancies, aortic aneurysm, sepsis, severe infections, pneumonia, severe   skin infections, liver cirrhosis, advance                           d age, coronary disease, diabetes, and pregnancy. A very low percentage of patients with DVT may yield D-dimer results below the cutoff of   0.5 mg/L FEU. This is known to be more prevalent in patients with distal DVT. Hospital Outpatient Visit on 01/03/2023   Component Date Value Ref Range Status    Magnesium 01/03/2023 2.1  1.6 - 2.6 mg/dL Final    Glucose 01/03/2023 75  70 - 99 mg/dL Final    BUN 01/03/2023 11  6 - 20 mg/dL Final    Creatinine 01/03/2023 0.57  0.50 - 0.90 mg/dL Final    Est, Glom Filt Rate 01/03/2023 >60  >60 mL/min/1.73m2 Final    Comment:       Effective Oct 3, 2022        These results are not intended for use in patients <25years of age. eGFR results are calculated without a race factor using the 2021 CKD-EPI equation. Careful clinical correlation is recommended, particularly when comparing to results   calculated using previous equations. The CKD-EPI equation is less accurate in patients with extremes of muscle mass, extra-renal   metabolism of creatine, excessive creatine ingestion, or following therapy that affects   renal tubular secretion.       Calcium 01/03/2023 9.7  8.6 - 10.4 mg/dL Final    Sodium 01/03/2023 135  135 - 144 mmol/L Final    Potassium 01/03/2023 4.7  3.7 - 5.3 mmol/L Final    Chloride 01/03/2023 101  98 - 107 mmol/L Final    CO2 01/03/2023 21 20 - 31 mmol/L Final    Anion Gap 2023 13  9 - 17 mmol/L Final    WBC 2023 11.4 (A)  3.5 - 11.3 k/uL Final    RBC 2023 3.77 (A)  3.95 - 5.11 m/uL Final    Hemoglobin 2023 11.5 (A)  11.9 - 15.1 g/dL Final    Hematocrit 2023 34.7 (A)  36.3 - 47.1 % Final    MCV 2023 92.0  82.6 - 102.9 fL Final    MCH 2023 30.5  25.2 - 33.5 pg Final    MCHC 2023 33.1  28.4 - 34.8 g/dL Final    RDW 2023 12.4  11.8 - 14.4 % Final    Platelets  169  138 - 453 k/uL Final    MPV 2023 12.4  8.1 - 13.5 fL Final    NRBC Automated 2023 0.0  0.0 per 100 WBC Final    TSH 2023 2.11  0.30 - 5.00 uIU/mL Final   ]    Assessment:  1Moshe Parker is a 29 y.o. female  2. IUP @ 35w5d  3. Di/Di twin gestation  4. ICP  5. Decreased FM w/ decreased AFV twin B  6. Elevated BPs w/ elevated P/C ratio  Patient Active Problem List    Diagnosis Date Noted    Celestone x  & 2023     Priority: Medium    Intrahepatic cholestasis of pregnancy in third trimester 2023     Priority: Medium    IVF pregnancy 2022     Priority: Medium     Overview Note:     [place] in [address] [address] diamniotic twin pregnancy  2022     Priority: Medium    FGR Baby A and B  2023     Overview Note:     Baby A ( AC <10th%)  Baby B ( AC<10th%)      Gastroesophageal reflux disease 2019    Anxiety and depression 2012     Overview Note:     No meds currently            Plan:  1.   2. Procedure risk and complications reviewed  3. SCIP ABX ordered  4. Thromboembolic prophylaxis ordered with EPC's BL  5.  Consent Obtained      Electronically signed by Mary Thompson DO  on 2023 at 5:00 AM

## 2023-01-25 NOTE — PROGRESS NOTES
Dr. Terri Shell to bedside to discuss plan of care with BP, headache.  Unsure at this time if c/s will be tonight or 0800 1/25

## 2023-01-25 NOTE — ANESTHESIA PRE PROCEDURE
Department of Anesthesiology  Preprocedure Note       Name:  Yennifer Mitchell   Age:  29 y.o.  :  1990                                          MRN:  [id number]         Date:  2023      Surgeon: Lizbeth Nava):  Di Dowling DO    Procedure: Procedure(s):   SECTION    Medications prior to admission:   Prior to Admission medications    Medication Sig Start Date End Date Taking? Authorizing Provider   ursodiol (ACTIGALL) 300 MG capsule Take 1 capsule by mouth 3 times daily (with meals) 23   DENTON Hamilton CNP   Misc. Devices (BREAST PUMP) MISC Double electric breast pump  Patient not taking: No sig reported 22   DENTON Hamilton CNP   aspirin 81 MG chewable tablet Take 81 mg by mouth in the morning.     Historical Provider, MD   UNABLE  [facility]    Historical Provider, MD   cetirizine (ZYRTEC) 10 MG tablet Take 10 mg by mouth daily  Patient not taking: No sig reported    Historical Provider, MD       Current medications:    Current Facility-Administered Medications   Medication Dose Route Frequency Provider Last Rate Last Admin    ondansetron (ZOFRAN-ODT) disintegrating tablet 4 mg  4 mg Oral Q8H PRN Radha Jolly DO        Or    ondansetron (ZOFRAN) injection 4 mg  4 mg IntraVENous Q6H PRN Radha Jolly, DO        acetaminophen (TYLENOL) tablet 650 mg  650 mg Oral Q4H PRN Radha Jolly DO        lactated ringers IV soln infusion   IntraVENous Continuous Radha Jolly DO        sodium chloride flush 0.9 % injection 10 mL  10 mL IntraVENous 2 times per day Radha Jolly DO        sodium chloride flush 0.9 % injection 10 mL  10 mL IntraVENous PRN Radha Jolly, DO        0.9 % sodium chloride infusion  25 mL IntraVENous PRN Radha Jolly DO        citric acid-sodium citrate (BICITRA) solution 30 mL  30 mL Oral Once Radha Jolly DO        famotidine (PEPCID) 20 mg in sodium chloride (PF) 0.9 % 10 mL injection  20 mg IntraVENous Once Chrystie Rummage, DO        oxytocin (PITOCIN) 30 units in 500 mL infusion  87.3 luis-units/min IntraVENous Continuous PRN Chrystie Rummage, DO        And    oxytocin (PITOCIN) 10 unit bolus from the bag  10 Units IntraVENous PRN Chrystie Rummage, DO        ceFAZolin (ANCEF) 2000 mg in sterile water 20 mL IV syringe  2,000 mg IntraVENous Once Chrystie Rummage, DO        azithromycin (ZITHROMAX) 500 mg in dextrose 5% 250 mL IVPB  500 mg IntraVENous On Call to [address] [place] [address], DO        magnesium oxide (MAG-OX) tablet 400 mg  400 mg Oral Daily Wells Carlos, DO   400 mg at 01/24/23 2326    melatonin tablet 3 mg  3 mg Oral Nightly PRN Holden Carlos, DO   3 mg at 01/25/23 0029    diphenhydrAMINE (BENADRYL) tablet 25 mg  25 mg Oral Q6H PRN Wells Carlos, DO           Allergies: Allergies   Allergen Reactions    Shellfish-Derived Products        Problem List:    Patient Active Problem List   Diagnosis Code    Anxiety and depression F41.9, F32. A    Gastroesophageal reflux disease K21.9    Dichorionic diamniotic twin pregnancy  O32.36    IVF pregnancy O30.009, O09.819    FGR Baby A and B  O36.5990    Intrahepatic cholestasis of pregnancy in third trimester O26.613, K83.1    Celestone x 21/18 & 1/19 O09.93       Past Medical History:        Diagnosis Date    Anxiety     C. difficile colitis     13YEARS OLD    Depression     Environmental allergies     Intrahepatic cholestasis of pregnancy in third trimester 1/19/2023    Seasonal allergic rhinitis 12/6/2016       Past Surgical History:        Procedure Laterality Date    TONSILLECTOMY AND ADENOIDECTOMY  1999    WISDOM TOOTH EXTRACTION         Social History:    Social History     Tobacco Use    Smoking status: Never    Smokeless tobacco: Never   Substance Use Topics    Alcohol use: Not Currently                                Counseling given: Not Answered      Vital Signs (Current):   Vitals:    01/24/23 2159 01/24/23 2200 01/24/23 2225 01/24/23 2300   BP: 120/75 120/75 126/74 121/82   Pulse: 77  73 68   Resp: 16 16 18 16   Temp:  98.3 °F (36.8 °C) 98.7 °F (37.1 °C)    TempSrc:  Oral Oral    SpO2:                                                  BP Readings from Last 3 Encounters:   01/24/23 121/82   01/23/23 120/74   01/19/23 125/77       NPO Status:  MN                                                                               BMI:   Wt Readings from Last 3 Encounters:   01/23/23 165 lb (74.8 kg)   01/19/23 169 lb (76.7 kg)   01/19/23 169 lb (76.7 kg)     There is no height or weight on file to calculate BMI.    CBC:   Lab Results   Component Value Date/Time    WBC 10.4 01/24/2023 05:51 PM    RBC 3.63 01/24/2023 05:51 PM    RBC 4.62 12/19/2011 11:15 AM    HGB 11.0 01/24/2023 05:51 PM    HCT 33.7 01/24/2023 05:51 PM    MCV 92.8 01/24/2023 05:51 PM    RDW 12.5 01/24/2023 05:51 PM     01/24/2023 05:51 PM       CMP:   Lab Results   Component Value Date/Time     01/24/2023 05:51 PM    K 3.8 01/24/2023 05:51 PM     01/24/2023 05:51 PM    CO2 20 01/24/2023 05:51 PM    BUN 14 01/24/2023 05:51 PM    CREATININE 0.62 01/24/2023 05:51 PM    GFRAA >60 09/24/2014 02:11 PM    LABGLOM >60 01/24/2023 05:51 PM    GLUCOSE 95 01/24/2023 05:51 PM    GLUCOSE 68 12/19/2011 11:15 AM    PROT 5.9 01/24/2023 05:51 PM    CALCIUM 8.9 01/24/2023 05:51 PM    BILITOT 0.2 01/24/2023 05:51 PM    ALKPHOS 127 01/24/2023 05:51 PM    AST 15 01/24/2023 05:51 PM    ALT 10 01/24/2023 05:51 PM       POC Tests: No results for input(s): POCGLU, POCNA, POCK, POCCL, POCBUN, POCHEMO, POCHCT in the last 72 hours.     Coags: No results found for: PROTIME, INR, APTT    HCG (If Applicable):   Lab Results   Component Value Date    PREGTESTUR Negative 02/06/2019    HCG NEGATIVE 10/24/2013        ABGs: No results found for: PHART, PO2ART, IVY7LYP, URY6SDP, BEART, Z4WAGAKH     Type & Screen (If Applicable):  No results found for: LABABO, [address]    Drug/Infectious Status (If Applicable):  Lab Results   Component Value Date/Time    HEPCAB NONREACTIVE 08/12/2022 08:41 AM       COVID-19 Screening (If Applicable):   Lab Results   Component Value Date/Time    COVID19 Not Detected 01/08/2023 03:41 PM           Anesthesia Evaluation   no history of anesthetic complications:   Airway: Mallampati: II     Neck ROM: full     Dental:          Pulmonary:       (-) asthma, recent URI and not a current smoker                           Cardiovascular:Negative CV ROS                      Neuro/Psych:   (+) depression/anxiety    (-) seizures           GI/Hepatic/Renal:   (+) GERD:,          ROS comment: cholestasis. Endo/Other:        (-) diabetes mellitus               Abdominal:             Vascular:           Other Findings:           Anesthesia Plan      spinal     ASA 2                                   Krystal Aguilar MD   1/25/2023

## 2023-01-25 NOTE — OP NOTE
[facility]  OBSTETRICAL  PHYSICIAN POST-OPERATIVE  NOTE:      Patient Name: Lisbet Alvarez  Patient : 1990  [facility]  Admission Date/Time: 2023  3:45 PM  Primary Care Physician: Taisha Chew MD  MRN #: [id number]  [facility] #: [id number]        Date: 2023  Time: 7:21 AM        Pre-operative Diagnosis:   Lisbet Alvarez is a 29 y.o. female at 27w7d       pregnancy <37 weeks, Multifetal pregnancy, and Pregnancy complicated by: see problem list  Patient Active Problem List    Diagnosis Date Noted    Celestone x  & 2023     Priority: Medium    Intrahepatic cholestasis of pregnancy in third trimester 2023     Priority: Medium    IVF pregnancy 2022     Priority: Medium     Overview Note:     [place] [id number] [address] diamniotic twin pregnancy  2022     Priority: Medium    FGR Baby A and B  2023     Overview Note:     Baby A ( AC <10th%)  Baby B ( AC<10th%)      Gastroesophageal reflux disease 2019    Anxiety and depression 2012     Overview Note:     No meds currently         IUP@ 35w5d  See Problem List  3. Di/Di twin gestation  4. FGR both twins  5. ICP  6. Pre-eclampsia w/o severe features  7. Decreased FM  8. Oligohydramnios twin B         Post-operative Diagnosis:    Living  infant(s)  Same as Pre-Op  Male/ Female      Procedures:  1.  Section- primary : Low Cervical, Transverse    2.  Abdominal Delivery of a Live Born     male (twin A)/ female( twin B)          Surgeon:  Karime Browning DO      Assistants:  [id number] [facility] D.OMoshe PGY3   2. Keren Leal D.O. PGY1      OR Staff:  Scrub Person First: Ba Hamilton      Anesthesia:  spinal    Duramorph Utilized: Yes        Estimated blood loss:  see QBL     Fluids:     IV: 1000 ml   Blood Products:  none   Cell Saver: No    Urine Output[de-identified]  50 ml (clear)    Drains:   TYPE: Johns  Urinary Catheter 23 (Active)         TRS Tissue Retention System Skin Retractor Utilized and placed under sterile conditions: No      Complications:  None      Findings/ Delivery Summary:  Mother's Information      Labor Events     Labor?: No  Induction: None  Augmentation: None  Cervical Ripening:   Now               Teresa Osorio [[id number]]      Labor Events     Labor?: No   Steroids?: Full Course  Cervical Ripening Date/Time:     Antibiotics Received during Labor?: No  Rupture Date/Time:     Rupture Type: AROM  Fluid Color: Clear  Fluid Odor: None  Fluid Volume: Scant  Induction: None  Augmentation: None  Labor Complications: Pre-eclampsia       Anesthesia    Method: Spinal       Start Pushing      Labor onset date/time:   Now     Dilation complete date/time:   Now     Start pushing date/time:    Decision date/time (emergent ):           Delivery ()      Delivery Date/Time:  23 06:40:00   Delivery Type: , Low Transverse    Details:  Trial of Labor?: No    Categorization: Primary    Priority: Non-scheduled   Indications for : Multiple Gestation   Skin Incision Type: Pfannenstiel     Uterine Incision: Low Transverse              Presentation    Presentation: Vertex  _: Occiput  _: Anterior       Shoulder Dystocia    Shoulder Dystocia Present?: No  Add Second Maneuver  Add Third Maneuver  Add Fourth Maneuver  Add Fifth Maneuver  Add Sixth Maneuver  Add Seventh Maneuver  Add Eighth Maneuver  Add Ninth Maneuver       Assisted Delivery Details    Forceps Attempted?: No  Vacuum Extractor Attempted?: No       Document Additional Attempt         Document Additional Attempt                 Cord    Vessels: 3 Vessels  Complications: None  Delayed Cord Clamping?: Yes  Cord Clamped Date/Time: 2023 06:41:00  Cord Blood Disposition: Lab  Gases Sent?: Yes       Placenta    Date/Time: 2023 06:43:00  Removal: Spontaneous  Appearance: Intact  Disposition: Pathology       Lacerations           Vaginal Counts        Sponges Needles Instruments   Initial Counts      Final Counts      If the count is incorrect due to Intentionally Retained Foreign Object (IRFO) add the IRFO LDA in Lines/Drains. Add LDA: Link to [facility]       Blood Loss  Mother: Ebenezer Villaseñor #[id number]     Start of Mother's Information      Delivery Blood Loss  23 0612 - 23 0721      None                 End of Mother's Information  Mother: Ebenezer Villaseñor #[id number]                Delivery Providers    Delivering clinician: Tatyana Phillips DO     Provider Role    Tatyana Phillips DO Obstetrician     Primary Nurse     Primary [place] Nurse     NICU Nurse     Neonatologist     Anesthesiologist     Nurse Anesthetist     Nurse Practitioner     Midwife     Nursery Nurse    Ayden Russo DO Chief Resident    Tru Jordan MD Resident              [place] Assessment    Living Status: Living  Delivery Location Comment: OR2     Apgar Scoring Key:    0 1 2    Skin Color: Blue or pale Acrocyanotic Completely pink    Heart Rate: Absent <100 bpm >100 bpm    Reflex Irritability: No response Grimace Cry or active withdrawal    Muscle Tone: Limp Some flexion Active motion    Respiratory Effort: Absent Weak cry; hypoventilation Good, crying                      Skin Color:   Heart Rate:   Reflex Irritability:   Muscle Tone:   Respiratory Effort:    Total:            1 Minute:    0    2    2    2    2    8        Apgar 1 total from OB History    5 Minute:    1    2    2    2    2    9        Apgar 5 total from OB History    10 Minute:              15 Minute:              20 Minute:                        Apgars Assigned By: NICU              Resuscitation                Measurements               Title      Skin to Skin Initiation Date/Time:       Skin to Skin End Date/Time:                    Ana Sorensen [[id number]] Labor Events     Labor?: No   Steroids?: Full Course  Cervical Ripening Date/Time:       Rupture Date/Time: 23 06:40:00   Rupture Type: AROM  Fluid Color: Clear  Fluid Odor: None  Fluid Volume: Scant  Augmentation: None  Labor Complications: None       Anesthesia    Method: Spinal       Start Pushing      Labor onset date/time:   Now     Dilation complete date/time:   Now     Start pushing date/time:    Decision date/time (emergent ):           Delivery ([place])      Delivery Date/Time:  23 06:40:00   Delivery Type: , Low Transverse    Details:  Trial of Labor?: No    Categorization: Primary    Priority: Non-scheduled   Indications for : Multiple Gestation   Skin Incision Type: Pfannenstiel     Uterine Incision: Low Transverse             [place] Presentation    Presentation: Vertex  _: Occiput  _: Anterior       Shoulder Dystocia    Shoulder Dystocia Present?: No  Add Second Maneuver  Add Third Maneuver  Add Fourth Maneuver  Add Fifth Maneuver  Add Sixth Maneuver  Add Seventh Maneuver  Add Eighth Maneuver  Add Ninth Maneuver       Assisted Delivery Details    Forceps Attempted?: No  Vacuum Extractor Attempted?: No       Document Additional Attempt         Document Additional Attempt                 Cord    Vessels: 3 Vessels  Complications: None  Delayed Cord Clamping?: Yes  Cord Clamped Date/Time: 2023 06:41:00  Cord Blood Disposition: Lab  Gases Sent?: Yes       Placenta    Date/Time: 2023 06:43:00  Removal: Spontaneous  Appearance: Intact  Disposition: Pathology       Lacerations           Vaginal Counts        Sponges Needles Instruments   Initial Counts      Final Counts      If the count is incorrect due to Intentionally Retained Foreign Object (IRFO) add the IRFO LDA in Lines/Drains.   Add LDA: Link to [facility]       Blood Loss  Mother: Vivi Racer #[id number]     Start of Mother's Information      Delivery Blood Loss  23 0612 - 23 0721      None                 End of Mother's Information  Mother: Shantell Phillips #[id number]                Delivery Providers    Delivering clinician: Apolinar Carreno DO     Provider Role    Apolinar Carreno DO Obstetrician     Primary Nurse     Primary [place] Nurse     NICU Nurse     Neonatologist     Anesthesiologist     Nurse Anesthetist     Nurse Practitioner     Midwife     Nursery Nurse    Alen Mercado DO Chief Resident    Leonel Bennett MD Resident              [place] Assessment    Living Status: Living  Delivery Location Comment: OR2     Apgar Scoring Key:    0 1 2    Skin Color: Blue or pale Acrocyanotic Completely pink    Heart Rate: Absent <100 bpm >100 bpm    Reflex Irritability: No response Grimace Cry or active withdrawal    Muscle Tone: Limp Some flexion Active motion    Respiratory Effort: Absent Weak cry; hypoventilation Good, crying                      Skin Color:   Heart Rate:   Reflex Irritability:   Muscle Tone:   Respiratory Effort: Total:            1 Minute:    0    2    2    2    2    8        Apgar 1 total from OB History    5 Minute:    1    2    2    2    2    9        Apgar 5 total from OB History    10 Minute:              15 Minute:              20 Minute:                        Apgars Assigned By: NICU              Resuscitation               [place] Measurements               Title      Skin to Skin Initiation Date/Time:       Skin to Skin End Date/Time:                        Living  infant(s), Male, and Female    Cephalic/ Cephalic  occiput anterior  Other:       Amniotic Fluid was: Clear  A Nuchal Cord: was not present x 0  A Spontaneous Cry Was Noted: Yes x 2  The Baby: was suctioned        The Placenta Was Removed:  intact, whole, and that the umbilical cord had three vessels noted.  2 placentas fused together    cord gasses were obtained and sent to the lab, cord blood was obtained and sent to the lab, and Pitocin, 20 milliunits in 1 liter of ringers lactate was administered, wide open, to assist with uterine contraction    The umbilical cord had delayed clamping of 1 minute: Yes x 2    The Maternal Adnexa was Visualized. There were not any adnexal masses. Endometrisis noted on right ovary        There is no height or weight on file to calculate BMI. (Wound Vac indicated for BMI Value>35)    Wound Vac Applied to Incision: No      Specimen:  Placenta sent to pathology yes  * No specimens in log *     Condition:  infant stable to general nursery(twin A) infant (twin B)to NICU due to <1800 gm, and mother stable    Blood Type and Rh: A POSITIVE        Rubella Immunity Status:    Rubella Antibody, IgG   Date Value Ref Range Status   2022 132.1 IU/mL Final     Comment:                 REFERENCE RANGE:  <5.0       NON-REACTIVE (non-immune)  5.0 TO 9.9 EQUIVOCAL  >=10.0     REACTIVE     (immune)                 Infant Feeding:    breast    Circumcision: Yes    All Sponge Counts Were Correct x 3 calls-Prior to closure of Peritoneum, Fascia, and Skin Layers: Yes        Attending Attestation: I was present and scrubbed for the entire procedure. SCIP will be utilized for Antibiotics: ancef  Allergies as of 2023 - Fully Reviewed 2023   Allergen Reaction Noted    Shellfish-derived products  2020         EPC's in  Place for DVT prophylaxis      Procedure: (Understanding of limitations from template op-reports exist)  Eliseo Lowery is a 29 y.o. female  @ 35w5d for  delivery. The risks, benefits, complications, alternative treatment options, and expected outcomes were discussed with the patient. Risks of surgery were discussed, including but not limited to: pain, bleeding, need for blood transfusion, infection, injury to internal organs including intestines, bladder, uterus, fallopian tubes, ovaries and rarely injury to the fetus.   Postoperative complications including pain, bleeding, need for blood transfusion, infection, re-operation, infection of the incisions were also explained. The patient verbalized understanding and agreed to proceed, giving informed consent. The patient was taken to Operating Room, identified as Kiersten Corado and the procedure verified as  Delivery. A Time Out was held and the above information confirmed. Procedure Details:  After Spinal Anesthetic with Duramorph was instilled in the seated position the patient was returned to the supine position with a wedge placed under her right hip. FHT's were obtained, the patient was prepped and draped in the usual sterile manner . A three minute drape delay was completed. The bladder was draining clear urine. SCIP antibiotics were infused, EPC's were in place and operating. A time out was completed. There was an adequate skin check both high and low. A Pfannenstiel incision was made with a #10 scalpel and carried down to the fascia with bovie cautery. Fascial incision was made and extended transversely. The fascia was  from the underlying rectus tissue superiorly and inferiorly. The peritoneum was identified and entered superiorly. The peritoneal incision was extended superiorly and inferiorly, care not to involve the bowel or the bladder. The Viki ASHWIN retractor was placed inferiorly into the incision. The vesico-uterine reflection was developed and skeletonized off the lower uterine segment with blunt and sharp dissection. The [facility] [employer] retractor was reapproximated. A low transverse uterine incision was made and the uterine cavity was entered with extreme caution with the blunt end of the scalpel. This incision was extended using digital dissection in a cephalad to caudad manner. A live male infant was delivered without complications. The head was delivered through the incision and fundal pressure was used for the remaining body of the . There was not a nuchal cord.  Twin B AROM with clear minimal fluid delivered from OA presentation. No nuchal cord. The neonates  had bulb suction of the mouth and nares. There was a spontaneous cry x 2. The infant was vigorous  and there was delayed cord clamping of 1 minute x 2. Please find the  Apgar scores and weight above in the delivery summary. The umbilical cord was then clamped and cut, the infants were handed off to the awaiting neonatology team staff member attending the delivery. Then cord specimen and cord blood was collected and the placenta was delivered using gentle traction and fundal massage, (Spontaneously), it was intact and appeared normal.  The uterus was cleared of all clots and debris and was firm and contracted. IV Pitocin was infusing. An outflow tract was confirmed. The uterine incision was closed with running locked sutures of 0 Vicryl, starting at each corner with figure of \"8's\" and moving to the midline. Excellent hemostasis was observed. Gutters were cleared of all clots and debris. The pelvis was irrigated with warm, sterile water. Uterine incision was reinspected and hemostatic. The uterus, bilateral tubes and ovaries were normal. Right ovary with endometrisis. The peritoneum was closed with 2-0 vicryl. The fascia was then reapproximated with running sutures of 0 Vicryl, starting at each corner and moving to the midline. It was checked for any defects and there were none. The subcutaneous tissue was irrigated, any bleeding sites were cauterized. The skin was closed in a subcuticular fashion with 4-0 vicryl, then covered with tincture of benzoin and steri-strips,  It was dressed with silver dressing  Sponge, Needle and instrument counts were called for and found to be correct prior to closure of the peritoneum, fascia, and skin layers. The urine was clear in the tubing and the bag at the end of the procedure. The patient received preoperative antibiotics and intraoperative analgesic.  EPC's were in place at the beginning of the case. Patient was returned to her LDRP in stable condition. See orders.             Attending's Name: Mariangel Brown DO      Physician Completing Document: Mariangel Brown DO    Electronically signed by Mariangel Brown DO on 1/25/2023 at 7:21 AM

## 2023-01-25 NOTE — PROGRESS NOTES
Pt ambulated to recovery room to promote maternal rest. Pt tucked into bed comfortably and  denies needs at this time

## 2023-01-25 NOTE — FLOWSHEET NOTE
Additional shadowing noted to pressure dressing. RN spoke to anaesthesia. Coagulation profile lab recommended. Ordered per OB resident.

## 2023-01-25 NOTE — PROGRESS NOTES
Pt assisted to room and into bed. Emotional support provided. RN continues to monitor.  Pt c/o feeling anxious

## 2023-01-25 NOTE — PROGRESS NOTES
0400: dr. Lashae Pak decides to proceed with c/s early. Pt taken to postpartum room 146 to shower with CHG soap pre-op. Pt accompanied by s/o.

## 2023-01-25 NOTE — FLOWSHEET NOTE
Patient wheeled over to post partum and report given to Halina Peacock, LINDA spouse at bedside holding infant.

## 2023-01-25 NOTE — CONSULTS
Initiation of Electric Breast Pumping     Pumping Initiated at 1300    Initiated due to    [x]   Baby in NICU   []   Plans exclusive pumping   []   Infant weight loss(supplement)   [x]   Baby not latching well    Flange Size    Right:   Left:     [x]   24    [x]   24     []   27    []   27     []   30    []   30     []   36    []   36  Instructions   [x]   Verbal instructions on how to setup pump and how to use initiation phase   [x]   Written sheet\" How to keep your breast pump kit clean\"   []   Expectation sheet for Breastfeeding mothers with pumping log   [x]   Frequency of pumping   [x]   Collection,labeling and storage of colostrum and milk    Supplies Provided   [x]   Pump initiation kit   []   Cleaning supplies (basin and soap)   []   Additional flange size   [x]   Oral syringes/snappies   [x]   Patient labels       - Baby A with fair attempt at breast.  Discussed late  feeding expectations and need to introduce pump. Mom agreeable. Baby B in NICU.

## 2023-01-26 LAB
ABSOLUTE EOS #: 0.06 K/UL (ref 0–0.44)
ABSOLUTE IMMATURE GRANULOCYTE: 0.07 K/UL (ref 0–0.3)
ABSOLUTE LYMPH #: 2.49 K/UL (ref 1.1–3.7)
ABSOLUTE MONO #: 1.24 K/UL (ref 0.1–1.2)
BASOPHILS # BLD: 0 % (ref 0–2)
BASOPHILS ABSOLUTE: 0.03 K/UL (ref 0–0.2)
EOSINOPHILS RELATIVE PERCENT: 0 % (ref 1–4)
FERRITIN: 28 NG/ML (ref 13–150)
HCT VFR BLD CALC: 23.4 % (ref 36.3–47.1)
HCT VFR BLD CALC: 30.8 % (ref 36.3–47.1)
HEMOGLOBIN: 10.3 G/DL (ref 11.9–15.1)
HEMOGLOBIN: 7.8 G/DL (ref 11.9–15.1)
IMMATURE GRANULOCYTES: 1 %
IRON SATURATION: 29 % (ref 20–55)
IRON: 161 UG/DL (ref 37–145)
LYMPHOCYTES # BLD: 18 % (ref 24–43)
MCH RBC QN AUTO: 30.2 PG (ref 25.2–33.5)
MCHC RBC AUTO-ENTMCNC: 33.3 G/DL (ref 28.4–34.8)
MCV RBC AUTO: 90.7 FL (ref 82.6–102.9)
MONOCYTES # BLD: 9 % (ref 3–12)
NRBC AUTOMATED: 0 PER 100 WBC
PDW BLD-RTO: 12.5 % (ref 11.8–14.4)
PLATELET # BLD: 139 K/UL (ref 138–453)
PMV BLD AUTO: 12.6 FL (ref 8.1–13.5)
RBC # BLD: 2.58 M/UL (ref 3.95–5.11)
SEG NEUTROPHILS: 72 % (ref 36–65)
SEGMENTED NEUTROPHILS ABSOLUTE COUNT: 9.85 K/UL (ref 1.5–8.1)
TOTAL IRON BINDING CAPACITY: 562 UG/DL (ref 250–450)
UNSATURATED IRON BINDING CAPACITY: 401 UG/DL (ref 112–347)
WBC # BLD: 13.7 K/UL (ref 3.5–11.3)

## 2023-01-26 PROCEDURE — 36430 TRANSFUSION BLD/BLD COMPNT: CPT

## 2023-01-26 PROCEDURE — 85025 COMPLETE CBC W/AUTO DIFF WBC: CPT

## 2023-01-26 PROCEDURE — 86900 BLOOD TYPING SEROLOGIC ABO: CPT

## 2023-01-26 PROCEDURE — 36415 COLL VENOUS BLD VENIPUNCTURE: CPT

## 2023-01-26 PROCEDURE — 85018 HEMOGLOBIN: CPT

## 2023-01-26 PROCEDURE — 83540 ASSAY OF IRON: CPT

## 2023-01-26 PROCEDURE — 2580000003 HC RX 258: Performed by: STUDENT IN AN ORGANIZED HEALTH CARE EDUCATION/TRAINING PROGRAM

## 2023-01-26 PROCEDURE — P9016 RBC LEUKOCYTES REDUCED: HCPCS

## 2023-01-26 PROCEDURE — 1220000000 HC SEMI PRIVATE OB R&B

## 2023-01-26 PROCEDURE — 82728 ASSAY OF FERRITIN: CPT

## 2023-01-26 PROCEDURE — 99024 POSTOP FOLLOW-UP VISIT: CPT | Performed by: OBSTETRICS & GYNECOLOGY

## 2023-01-26 PROCEDURE — 83550 IRON BINDING TEST: CPT

## 2023-01-26 PROCEDURE — 85014 HEMATOCRIT: CPT

## 2023-01-26 PROCEDURE — 88307 TISSUE EXAM BY PATHOLOGIST: CPT

## 2023-01-26 PROCEDURE — 6370000000 HC RX 637 (ALT 250 FOR IP): Performed by: STUDENT IN AN ORGANIZED HEALTH CARE EDUCATION/TRAINING PROGRAM

## 2023-01-26 RX ORDER — SENNA AND DOCUSATE SODIUM 50; 8.6 MG/1; MG/1
1 TABLET, FILM COATED ORAL DAILY
Qty: 30 TABLET | Refills: 0 | Status: SHIPPED | OUTPATIENT
Start: 2023-01-26 | End: 2023-01-29 | Stop reason: SDUPTHER

## 2023-01-26 RX ORDER — IBUPROFEN 800 MG/1
800 TABLET ORAL EVERY 8 HOURS PRN
Qty: 60 TABLET | Refills: 0 | Status: SHIPPED | OUTPATIENT
Start: 2023-01-26 | End: 2023-01-29 | Stop reason: SDUPTHER

## 2023-01-26 RX ORDER — OXYCODONE HYDROCHLORIDE 5 MG/1
5 TABLET ORAL EVERY 6 HOURS PRN
Qty: 20 TABLET | Refills: 0 | Status: SHIPPED | OUTPATIENT
Start: 2023-01-26 | End: 2023-01-29 | Stop reason: SDUPTHER

## 2023-01-26 RX ORDER — SODIUM CHLORIDE 9 MG/ML
INJECTION, SOLUTION INTRAVENOUS PRN
Status: DISCONTINUED | OUTPATIENT
Start: 2023-01-26 | End: 2023-01-29 | Stop reason: HOSPADM

## 2023-01-26 RX ORDER — LANOLIN ALCOHOL/MO/W.PET/CERES
325 CREAM (GRAM) TOPICAL 2 TIMES DAILY
Qty: 90 TABLET | Refills: 3 | Status: SHIPPED | OUTPATIENT
Start: 2023-01-26 | End: 2023-01-29 | Stop reason: SDUPTHER

## 2023-01-26 RX ADMIN — OXYCODONE HYDROCHLORIDE 5 MG: 5 TABLET ORAL at 04:28

## 2023-01-26 RX ADMIN — IBUPROFEN 800 MG: 800 TABLET, FILM COATED ORAL at 20:38

## 2023-01-26 RX ADMIN — OXYCODONE HYDROCHLORIDE 10 MG: 5 TABLET ORAL at 13:21

## 2023-01-26 RX ADMIN — ACETAMINOPHEN 1000 MG: 500 TABLET ORAL at 15:01

## 2023-01-26 RX ADMIN — IBUPROFEN 800 MG: 800 TABLET, FILM COATED ORAL at 11:17

## 2023-01-26 RX ADMIN — SODIUM CHLORIDE, PRESERVATIVE FREE 10 ML: 5 INJECTION INTRAVENOUS at 20:38

## 2023-01-26 RX ADMIN — POLYETHYLENE GLYCOL 3350 17 G: 17 POWDER, FOR SOLUTION ORAL at 08:29

## 2023-01-26 RX ADMIN — Medication 1 TABLET: at 08:29

## 2023-01-26 RX ADMIN — DOCUSATE SODIUM 100 MG: 100 CAPSULE ORAL at 08:29

## 2023-01-26 RX ADMIN — DOCUSATE SODIUM 100 MG: 100 CAPSULE ORAL at 20:38

## 2023-01-26 RX ADMIN — OXYCODONE HYDROCHLORIDE 10 MG: 5 TABLET ORAL at 23:57

## 2023-01-26 RX ADMIN — OXYCODONE HYDROCHLORIDE 10 MG: 5 TABLET ORAL at 18:32

## 2023-01-26 RX ADMIN — ACETAMINOPHEN 1000 MG: 500 TABLET ORAL at 20:37

## 2023-01-26 RX ADMIN — OXYCODONE HYDROCHLORIDE 5 MG: 5 TABLET ORAL at 08:33

## 2023-01-26 RX ADMIN — SODIUM CHLORIDE, PRESERVATIVE FREE 10 ML: 5 INJECTION INTRAVENOUS at 08:30

## 2023-01-26 RX ADMIN — ACETAMINOPHEN 1000 MG: 500 TABLET ORAL at 00:28

## 2023-01-26 RX ADMIN — ACETAMINOPHEN 1000 MG: 500 TABLET ORAL at 08:29

## 2023-01-26 RX ADMIN — OXYCODONE HYDROCHLORIDE 5 MG: 5 TABLET ORAL at 00:28

## 2023-01-26 RX ADMIN — IBUPROFEN 800 MG: 800 TABLET, FILM COATED ORAL at 04:28

## 2023-01-26 ASSESSMENT — PAIN SCALES - GENERAL
PAINLEVEL_OUTOF10: 3
PAINLEVEL_OUTOF10: 2
PAINLEVEL_OUTOF10: 4
PAINLEVEL_OUTOF10: 3
PAINLEVEL_OUTOF10: 4
PAINLEVEL_OUTOF10: 4
PAINLEVEL_OUTOF10: 1
PAINLEVEL_OUTOF10: 3
PAINLEVEL_OUTOF10: 7
PAINLEVEL_OUTOF10: 3
PAINLEVEL_OUTOF10: 2
PAINLEVEL_OUTOF10: 3
PAINLEVEL_OUTOF10: 2
PAINLEVEL_OUTOF10: 3
PAINLEVEL_OUTOF10: 4
PAINLEVEL_OUTOF10: 2

## 2023-01-26 ASSESSMENT — PAIN DESCRIPTION - LOCATION
LOCATION: ABDOMEN
LOCATION: ABDOMEN;INCISION
LOCATION: ABDOMEN;INCISION
LOCATION: ABDOMEN
LOCATION: ABDOMEN;INCISION
LOCATION: ABDOMEN
LOCATION: ABDOMEN;INCISION
LOCATION: ABDOMEN;INCISION
LOCATION: ABDOMEN

## 2023-01-26 ASSESSMENT — PAIN DESCRIPTION - PAIN TYPE
TYPE: SURGICAL PAIN

## 2023-01-26 ASSESSMENT — PAIN DESCRIPTION - ONSET
ONSET: ON-GOING

## 2023-01-26 ASSESSMENT — PAIN DESCRIPTION - FREQUENCY
FREQUENCY: CONTINUOUS

## 2023-01-26 ASSESSMENT — PAIN DESCRIPTION - DESCRIPTORS
DESCRIPTORS: ACHING;DISCOMFORT
DESCRIPTORS: SORE;DISCOMFORT
DESCRIPTORS: SORE
DESCRIPTORS: ACHING;DISCOMFORT
DESCRIPTORS: ACHING;BURNING
DESCRIPTORS: ACHING
DESCRIPTORS: ACHING;CRAMPING;DISCOMFORT
DESCRIPTORS: ACHING;BURNING

## 2023-01-26 ASSESSMENT — PAIN - FUNCTIONAL ASSESSMENT
PAIN_FUNCTIONAL_ASSESSMENT: ACTIVITIES ARE NOT PREVENTED
PAIN_FUNCTIONAL_ASSESSMENT: ACTIVITIES ARE NOT PREVENTED

## 2023-01-26 ASSESSMENT — PAIN DESCRIPTION - ORIENTATION: ORIENTATION: LOWER

## 2023-01-26 NOTE — FLOWSHEET NOTE
Per anaesthesia patient is able to go down to NICU. Pressure dressing to stay on. RN will call with any other concerns. Rhofade Pregnancy And Lactation Text: This medication has not been assigned a Pregnancy Risk Category. It is unknown if the medication is excreted in breast milk.

## 2023-01-26 NOTE — PROGRESS NOTES
POST OPERATIVE DAY # 1    Clary Parker is a 29 y.o. female   This patient was seen and examined today. PLTCS on 1/25/23    Her pregnancy was complicated by:   Patient Active Problem List   Diagnosis    Anxiety and depression    Gastroesophageal reflux disease    Dichorionic diamniotic twin pregnancy     IVF pregnancy    FGR Baby A and B     Intrahepatic cholestasis of pregnancy in third trimester    Celestone x 21/18 & 1/19    PLTCS 1/25/23 M Apg 8/9 Wt 4#11, F Apg 8/9 Wt 3#13       Today she is doing well without any chief complaint. Her lochia is light. She denies chest pain, shortness of breath, headache, lightheadedness, blurred vision and peripheral edema. She is breast feeding and she denies any signs or symptoms of mastitis. She is ambulating well. She is voiding without difficulty. She currently denies S/S of postpartum depression. Flatus present. Bowel movement absent. She is tolerating solids.     Vital Signs:  Vitals:    01/25/23 1600 01/25/23 1957 01/25/23 2352 01/26/23 0349   BP: 128/89 108/73 110/84 123/78   Pulse: 64 79 63 64   Resp: 17 17 18 18   Temp: 98.5 °F (36.9 °C) 98.5 °F (36.9 °C) 97.7 °F (36.5 °C) 97.8 °F (36.6 °C)   TempSrc: Oral Oral Oral Oral   SpO2: 98% 98% 98% 97%         Urine Input & Output last 24hrs:     Intake/Output Summary (Last 24 hours) at 1/26/2023 6310  Last data filed at 1/26/2023 0120  Gross per 24 hour   Intake 1984.56 ml   Output 3275 ml   Net -1290.44 ml       Physical Exam:  General:  no apparent distress, alert and cooperative  Neurologic:  alert, oriented, normal speech, no focal findings or movement disorder noted  Lungs:  No increased work of breathing, good air exchange, clear to auscultation bilaterally, no crackles or wheezing  Heart:  Regular rate and rhythm, normal S1 and S2, no S3 or S4, and no murmur noted    Abdomen: abdomen soft, non-distended, non-tender, bowel sounds present   Fundus: non-tender, firm, below umbilicus  Incision: clean, dry, and Silver dressing in place  Extremities:  no calf tenderness, non edematous    Labs:  Lab Results   Component Value Date    WBC 13.7 (H) 2023    HGB 7.8 (L) 2023    HCT 23.4 (L) 2023    MCV 90.7 2023     2023       Assessment/Plan:  Chela Hwang is a  POD # 1 s/p PLTCS   - Doing well, VSS    - male infant in [address], circumcision desired, female infant in NICU   - Encourage ambulation and use of incentive spirometer   - D/C lynn catheter and saline lock IV on POD #1    - CBC awaiting    - Clarisse/Motrin/Tylenol for pain   Rh positive/Rubella immune   - MMR/Rhogam not indicated   Breast feeding   - Denies s/s of mastitis  Depression/Anxiety    - Currently managed without medications    - Denies SI/HI  ICP    - Denies itchiness   - Bile acids 11   - Ursodiol as needed  PreE w/o SF    - Denies s/s of PreE   - PreE labs wnl, P/C 1.35   - BP's normotensive overnight    - Will continue to monitor closely   BMI 26  Continue post-op care. Counseling Completed:  Secondary Smoke risks and Sudden Infant Death Syndrome were reviewed with recommendations. Infant sleeping, \"back to sleep\" and avoidance of co-sleeping recommendations were reviewed. Signs and Symptoms of Post Partum Depression were reviewed. The patient is to call if any occur. Signs and symptoms of Mastitis were reviewed. The patient is to call if any occur for follow up. Discharge instructions including pelvic rest, incision care, 15 lb weight restriction, no driving with pain medicine and office follow-up were reviewed with patient     Attending Physician: Dr. Cathy Bruno MD  Ob/Gyn Resident  2023, 6:38 AM          Attending Physician Statement  I have discussed the care of Chela Hwang, including pertinent history and exam findings,  with the resident. I have seen and examined the patient and the key elements of all parts of the encounter have been performed by me.   I agree with the assessment, plan and orders as documented by the resident. (GC Modifier)     Pt. Seen and examined. She is doing well. She is feeling weak and had an episode of dizziness with ambulation this AM.  Discussed anemia with patient and she is agreeable to [facility]. Denies CP/SOB/F/CH/N/V/HA. She is tolerating PO and voiding. She is breastfeeding and pumping.     Vitals:    01/26/23 0934 01/26/23 0945 01/26/23 1000 01/26/23 1156   BP: 119/87 117/83 117/87 133/86   Pulse: 75 97 84 76   Resp: 16 16 16 16   Temp: 97.8 °F (36.6 °C) 97.3 °F (36.3 °C) 97.6 °F (36.4 °C) 97.6 °F (36.4 °C)   TempSrc:  Axillary Axillary Axillary   SpO2: 96% 97% 97% 96%     Recent Results (from the past 24 hour(s))   Protime-INR    Collection Time: 01/25/23  6:09 PM   Result Value Ref Range    Protime 10.2 9.1 - 12.3 sec    INR 0.9    Fibrinogen    Collection Time: 01/25/23  6:09 PM   Result Value Ref Range    Fibrinogen 264 140 - 420 mg/dL   APTT    Collection Time: 01/25/23  6:09 PM   Result Value Ref Range    PTT 23.9 20.5 - 30.5 sec   CBC auto differential    Collection Time: 01/26/23  4:20 AM   Result Value Ref Range    WBC 13.7 (H) 3.5 - 11.3 k/uL    RBC 2.58 (L) 3.95 - 5.11 m/uL    Hemoglobin 7.8 (L) 11.9 - 15.1 g/dL    Hematocrit 23.4 (L) 36.3 - 47.1 %    MCV 90.7 82.6 - 102.9 fL    MCH 30.2 25.2 - 33.5 pg    MCHC 33.3 28.4 - 34.8 g/dL    RDW 12.5 11.8 - 14.4 %    Platelets 706 745 - 264 k/uL    MPV 12.6 8.1 - 13.5 fL    NRBC Automated 0.0 0.0 per 100 WBC    Seg Neutrophils 72 (H) 36 - 65 %    Lymphocytes 18 (L) 24 - 43 %    Monocytes 9 3 - 12 %    Eosinophils % 0 (L) 1 - 4 %    Basophils 0 0 - 2 %    Immature Granulocytes 1 (H) 0 %    Segs Absolute 9.85 (H) 1.50 - 8.10 k/uL    Absolute Lymph # 2.49 1.10 - 3.70 k/uL    Absolute Mono # 1.24 (H) 0.10 - 1.20 k/uL    Absolute Eos # 0.06 0.00 - 0.44 k/uL    Basophils Absolute 0.03 0.00 - 0.20 k/uL    Absolute Immature Granulocyte 0.07 0.00 - 0.30 k/uL     POD#1 Prim C/S, boy/girl, circ done  Rh+  Anemia of acute blood loss - C/S as well as bleeding from spinal site, VSS, 1u PRBC and pressure bandage to back. Breastfeeding  Depression/Anxiety - doing well without concern at this time. She was counseled on s/s  PreE w/o SF, VSS, no s/s at this time. Continure current post op care, recheck H&H tonight.     Kathryn Saucedo, DO

## 2023-01-26 NOTE — LACTATION NOTE
In to round on pt, not feeling well at this time and receiving a unit of blood. Pt hasn't pumped or put baby to breast since yesterday. Would like help with flange sizing. Encouraged to call out for assistance when ready.

## 2023-01-26 NOTE — FLOWSHEET NOTE
Pt assisted to bathroom. Upon arrival to bathroom, pt became lightheaded and dizzy, color pale. Pt on toilet. Called for assistance. Assisted pt back to bed via soha mason. Pt tolerated well. Once pt in bed laying down, pt states dizziness and lightheadedness gone, but continues to have ringing in ears. Residents notified.

## 2023-01-26 NOTE — PROGRESS NOTES
Obstetric/Gynecology Resident Interval Note    Patient reports feeling dizzy and lightheaded when she ambulates to the bathroom. Overall well appearing. Hgb 7.8 this this morning. Will transfuse 1 unit PRBC and continue to monitor.     Vitals:    01/26/23 0820 01/26/23 0934 01/26/23 0945 01/26/23 1000   BP: 107/71 119/87 117/83 117/87   Pulse: 65 75 97 84   Resp: 16 16 16 16   Temp:  97.8 °F (36.6 °C) 97.3 °F (36.3 °C) 97.6 °F (36.4 °C)   TempSrc:   Axillary Axillary   SpO2:  96% 97% 97%         Laura Hoffman DO  OB/GYN Resident, PGY4  [place], [place]  1/26/2023, 10:04 AM

## 2023-01-26 NOTE — LACTATION NOTE
Called in to assist with pumping, sized 21mm flanges, pt said 24s were painful and this feels more comfortable. Reviewed frequency and encouraged to call out for assistance as needed.

## 2023-01-26 NOTE — CARE COORDINATION
Social Work     Sw reviewed medical record (current active problem list) and tox screens and found no current concerns. Sw spoke with mom briefly to explain Sw role, inquire if any needs or concerns, and provide safe sleep education and discuss. Mom denied any needs or questions and informs baby has a safe sleep environment. (2 cribs and 2 bassinets)     Mom denied any current s/s of anxiety or depression and is aware to reach out to OB if any s/s occur after dc. Mom reports a good support system and denied any current questions or needs. Mom reports the twins are her first babies. Mom states that she resides with [facility]. Mom states ped will be [facility]. Sw encouraged mom to reach out if any issues or concerns arise.                           Hollie Intern Ashleigh Alexis

## 2023-01-26 NOTE — CARE COORDINATION
CASE MANAGEMENT POST-PARTUM TRANSITIONAL CARE PLAN    Dichorionic diamniotic twin pregnancy in third trimester [O30.043]  High-risk pregnancy, unspecified trimester [O09.90]    OB Provider: Dr. Vianca Arroyo met w/ Mac Robles and her  Jacob Claude at bedside to discuss DCP. She is S/P C/S of Di/Di Twins on 1/25/23 @ 35w4d due to IUGR and Oligo both born @ 200    Baby A: Male- Went to [facility] B Female- Transferred to [facility] NICU due to low BW requiring Isolette for temperature instability    Writer verified name/address/phone number correct on facesheet. She states she lives with her . Mac Police verbalized no problems with transportation to and from doctors appointments or with paying for medications upon discharge home. [employer] correct. Writer notified Mac Robles and Jacob Arce they have 30 days from date of birth to add newborns to insurance policy. They verbalized understanding. Mac Robles and Jacob Arce confirmed a safe place for  each infant to sleep alone at home. Infants names on BC:   A: Pat Stevenson  . Infant  [address]. DME: None  HOME CARE: none    This CM discussed NICU rounding, necessity to keep infant band IDs on and need for them in NICU, phone number to NICU, rounding in the NICU and nicu visitation.     Anticipate DC 1/29/23    Readmission Risk              Risk of Unplanned Readmission:  7

## 2023-01-27 LAB
ABO/RH: NORMAL
ANTIBODY SCREEN: NEGATIVE
ARM BAND NUMBER: NORMAL
BLD PROD TYP BPU: NORMAL
BLOOD BANK BLOOD PRODUCT EXPIRATION DATE: NORMAL
BLOOD BANK ISBT PRODUCT BLOOD TYPE: 6200
BLOOD BANK PRODUCT CODE: NORMAL
BLOOD BANK UNIT TYPE AND RH: NORMAL
BPU ID: NORMAL
CROSSMATCH RESULT: NORMAL
CULTURE: NORMAL
DISPENSE STATUS BLOOD BANK: NORMAL
EXPIRATION DATE: NORMAL
SPECIMEN DESCRIPTION: NORMAL
SURGICAL PATHOLOGY REPORT: NORMAL
TRANSFUSION STATUS: NORMAL
UNIT DIVISION: 0
UNIT ISSUE DATE/TIME: NORMAL

## 2023-01-27 PROCEDURE — 6370000000 HC RX 637 (ALT 250 FOR IP): Performed by: STUDENT IN AN ORGANIZED HEALTH CARE EDUCATION/TRAINING PROGRAM

## 2023-01-27 PROCEDURE — 1220000000 HC SEMI PRIVATE OB R&B

## 2023-01-27 RX ADMIN — OXYCODONE HYDROCHLORIDE 10 MG: 5 TABLET ORAL at 09:45

## 2023-01-27 RX ADMIN — POLYETHYLENE GLYCOL 3350 17 G: 17 POWDER, FOR SOLUTION ORAL at 08:21

## 2023-01-27 RX ADMIN — OXYCODONE HYDROCHLORIDE 10 MG: 5 TABLET ORAL at 05:56

## 2023-01-27 RX ADMIN — SIMETHICONE 80 MG: 80 TABLET, CHEWABLE ORAL at 09:49

## 2023-01-27 RX ADMIN — IBUPROFEN 800 MG: 800 TABLET, FILM COATED ORAL at 13:50

## 2023-01-27 RX ADMIN — ACETAMINOPHEN 1000 MG: 500 TABLET ORAL at 16:02

## 2023-01-27 RX ADMIN — OXYCODONE HYDROCHLORIDE 10 MG: 5 TABLET ORAL at 18:01

## 2023-01-27 RX ADMIN — IBUPROFEN 800 MG: 800 TABLET, FILM COATED ORAL at 22:25

## 2023-01-27 RX ADMIN — DOCUSATE SODIUM 100 MG: 100 CAPSULE ORAL at 22:24

## 2023-01-27 RX ADMIN — OXYCODONE HYDROCHLORIDE 10 MG: 5 TABLET ORAL at 22:24

## 2023-01-27 RX ADMIN — IBUPROFEN 800 MG: 800 TABLET, FILM COATED ORAL at 05:56

## 2023-01-27 RX ADMIN — DOCUSATE SODIUM 100 MG: 100 CAPSULE ORAL at 08:21

## 2023-01-27 RX ADMIN — ACETAMINOPHEN 1000 MG: 500 TABLET ORAL at 22:25

## 2023-01-27 RX ADMIN — ACETAMINOPHEN 1000 MG: 500 TABLET ORAL at 02:47

## 2023-01-27 RX ADMIN — OXYCODONE HYDROCHLORIDE 10 MG: 5 TABLET ORAL at 13:50

## 2023-01-27 RX ADMIN — ACETAMINOPHEN 1000 MG: 500 TABLET ORAL at 09:45

## 2023-01-27 ASSESSMENT — PAIN DESCRIPTION - DESCRIPTORS
DESCRIPTORS: SORE;BURNING;DISCOMFORT
DESCRIPTORS: SORE
DESCRIPTORS: SORE
DESCRIPTORS: ACHING;DISCOMFORT;SORE
DESCRIPTORS: CRAMPING
DESCRIPTORS: SORE;DISCOMFORT

## 2023-01-27 ASSESSMENT — PAIN - FUNCTIONAL ASSESSMENT
PAIN_FUNCTIONAL_ASSESSMENT: ACTIVITIES ARE NOT PREVENTED

## 2023-01-27 ASSESSMENT — PAIN SCALES - GENERAL
PAINLEVEL_OUTOF10: 4
PAINLEVEL_OUTOF10: 3
PAINLEVEL_OUTOF10: 1
PAINLEVEL_OUTOF10: 7
PAINLEVEL_OUTOF10: 7
PAINLEVEL_OUTOF10: 3
PAINLEVEL_OUTOF10: 5
PAINLEVEL_OUTOF10: 4
PAINLEVEL_OUTOF10: 7

## 2023-01-27 ASSESSMENT — PAIN DESCRIPTION - LOCATION
LOCATION: ABDOMEN
LOCATION: ABDOMEN;INCISION

## 2023-01-27 ASSESSMENT — PAIN DESCRIPTION - ORIENTATION
ORIENTATION: MID;LOWER
ORIENTATION: LOWER
ORIENTATION: MID;LOWER
ORIENTATION: LOWER

## 2023-01-27 NOTE — FLOWSHEET NOTE
Pt reported that she felt a little discomfort under her right ribs and she remembered reading that she should notify someone. Writer notified residents of [facility] of 154/104 heart rate 68 and right upper quadrant discomfort that \"came and went\".

## 2023-01-27 NOTE — LACTATION NOTE
Pt states she has been pumping every 2-3 hours except for overnight last night, and has not seen many drops of colostrum. Reassured and reviewed milk removal 8-12 times in 24 hours. Pt states 21mm flanges were painful with the last session but she also mentioned she may have turned her suction level up too high.

## 2023-01-27 NOTE — FLOWSHEET NOTE
Patient doesn't want to shower at this time since she has experienced a blood transfusion and it exhausted. Patient instructed on the importance of showering daily and using the chg soap, especially on her incision, to decrease the chance of an infection.

## 2023-01-27 NOTE — PROGRESS NOTES
POST OPERATIVE DAY # 2    Jhony Salmeron is a 29 y.o. female   This patient was seen and examined today. PLTCS on 23    Her pregnancy was complicated by:   Patient Active Problem List   Diagnosis    Anxiety and depression    Gastroesophageal reflux disease    Dichorionic diamniotic twin pregnancy     IVF pregnancy    FGR Baby A and B     Intrahepatic cholestasis of pregnancy in third trimester    Celestone x  &     PLTCS 23 M Apg 8/9 Wt 4#11, F Apg 8/9 Wt 3#13    Postpartum care following  delivery       Today she is doing well without any chief complaint. Her lochia is light. She denies chest pain, shortness of breath, headache, lightheadedness, blurred vision and peripheral edema. She is bottle feeding and she denies any signs or symptoms of mastitis. She is ambulating well. She is voiding without difficulty. She currently denies S/S of postpartum depression. Flatus present. Bowel movement absent. She is tolerating solids.     Vital Signs:  Vitals:    23 1000 23 1156 23 2037 23 2357   BP: 117/87 133/86 (!) 154/104 (!) 141/104   Pulse: 84 76 68 66   Resp: 16 16 17 17   Temp: 97.6 °F (36.4 °C) 97.6 °F (36.4 °C) 97.8 °F (36.6 °C)    TempSrc: Axillary Axillary Oral    SpO2: 97% 96% 97%          Urine Input & Output last 24hrs:     Intake/Output Summary (Last 24 hours) at 2023 0105  Last data filed at 2023 1156  Gross per 24 hour   Intake 270.7 ml   Output 1200 ml   Net -929.3 ml       Physical Exam:  General:  no apparent distress, alert and cooperative  Neurologic:  alert, oriented, normal speech, no focal findings or movement disorder noted  Lungs:  No increased work of breathing, good air exchange, clear to auscultation bilaterally, no crackles or wheezing  Heart:  Regular rate and rhythm, normal S1 and S2, no S3 or S4, and no murmur noted    Abdomen: abdomen soft, non-distended, non-tender, bowel sounds present   Fundus: non-tender, firm, below umbilicus  Incision: clean, dry, and silver dressing in place  Extremities:  no calf tenderness, non edematous    Labs:  Lab Results   Component Value Date    WBC 13.7 (H) 2023    HGB 10.3 (L) 2023    HCT 30.8 (L) 2023    MCV 90.7 2023     2023       Assessment/Plan:  Eliseo Lowery is a  POD # 2 s/p PLTCS   - Doing well, VSS    - male infant in [address], circumcision done, female infant in NICU   - Encourage ambulation and use of incentive spirometer   - CBC 7.8 post op, received 1 unit PRBC post transfusion Hgb 10.3  Rh positive/Rubella immune  Bottle feeding  - Denies s/s mastitis   PreE w/o SF  - Mostly normotensive, occasionally hypertensive not severe range  - PreE labs wnl, P/X 1.35  Anemia  - 7.8>1u PRBC>10.3  - Patient reports symptoms of anemia have resolved  Anxiety/Depression  - Stable on no meds  - Denies SI/HI  Continue post-op care. - Does not desire discharge at this time    Counseling Completed:  Secondary Smoke risks and Sudden Infant Death Syndrome were reviewed with recommendations. Infant sleeping, \"back to sleep\" and avoidance of co-sleeping recommendations were reviewed. Signs and Symptoms of Post Partum Depression were reviewed. The patient is to call if any occur. Signs and symptoms of Mastitis were reviewed. The patient is to call if any occur for follow up. Discharge instructions including pelvic rest, incision care, 15 lb weight restriction, no driving with pain medicine and office follow-up were reviewed with patient     Attending Physician: Dr. Edyta Rizvi DO  Ob/Gyn Resident  2023, 1:05 AM    Date: 2023  Time: 10:15 AM      Patient Name: Eliseo Lowery  Patient : 1990  Room/Bed: 0132/5711-21  Admission Date/Time: 2023  3:45 PM        Attending Physician Statement  I have discussed the care of Eliseo Lowery, including pertinent history and exam findings with the resident.  I have reviewed and edited their note in the electronic medical record. The key elements of all parts of the encounter have been performed/reviewed by me . I agree with the assessment, plan and orders as documented by the resident. The level of care submitted represents to the best of my ability the care documented in the medical record today. GC Modifier. This service has been performed in part by a resident under the direction of a teaching physician. POD#2 doing well. Patient has preeclampsia without SF. We reviewed severe features and warning signs. Vital signs not severe, no cerebral disturbances, labs on 1/24 were wnl.      Attending's Name:  Patrick Hidalgo DO

## 2023-01-28 PROCEDURE — 1220000000 HC SEMI PRIVATE OB R&B

## 2023-01-28 PROCEDURE — 6370000000 HC RX 637 (ALT 250 FOR IP): Performed by: STUDENT IN AN ORGANIZED HEALTH CARE EDUCATION/TRAINING PROGRAM

## 2023-01-28 PROCEDURE — 99024 POSTOP FOLLOW-UP VISIT: CPT | Performed by: OBSTETRICS & GYNECOLOGY

## 2023-01-28 PROCEDURE — 2580000003 HC RX 258: Performed by: STUDENT IN AN ORGANIZED HEALTH CARE EDUCATION/TRAINING PROGRAM

## 2023-01-28 RX ORDER — CYCLOBENZAPRINE HCL 10 MG
10 TABLET ORAL 3 TIMES DAILY PRN
Status: DISCONTINUED | OUTPATIENT
Start: 2023-01-28 | End: 2023-01-29 | Stop reason: HOSPADM

## 2023-01-28 RX ADMIN — ACETAMINOPHEN 1000 MG: 500 TABLET ORAL at 05:23

## 2023-01-28 RX ADMIN — ACETAMINOPHEN 1000 MG: 500 TABLET ORAL at 12:11

## 2023-01-28 RX ADMIN — OXYCODONE HYDROCHLORIDE 5 MG: 5 TABLET ORAL at 20:51

## 2023-01-28 RX ADMIN — DOCUSATE SODIUM 100 MG: 100 CAPSULE ORAL at 20:51

## 2023-01-28 RX ADMIN — OXYCODONE HYDROCHLORIDE 5 MG: 5 TABLET ORAL at 14:55

## 2023-01-28 RX ADMIN — POLYETHYLENE GLYCOL 3350 17 G: 17 POWDER, FOR SOLUTION ORAL at 08:57

## 2023-01-28 RX ADMIN — SODIUM CHLORIDE, PRESERVATIVE FREE 10 ML: 5 INJECTION INTRAVENOUS at 15:46

## 2023-01-28 RX ADMIN — OXYCODONE HYDROCHLORIDE 5 MG: 5 TABLET ORAL at 08:57

## 2023-01-28 RX ADMIN — DOCUSATE SODIUM 100 MG: 100 CAPSULE ORAL at 08:57

## 2023-01-28 RX ADMIN — CYCLOBENZAPRINE 10 MG: 10 TABLET, FILM COATED ORAL at 15:45

## 2023-01-28 RX ADMIN — IBUPROFEN 800 MG: 800 TABLET, FILM COATED ORAL at 06:38

## 2023-01-28 RX ADMIN — ACETAMINOPHEN 1000 MG: 500 TABLET ORAL at 20:50

## 2023-01-28 RX ADMIN — OXYCODONE HYDROCHLORIDE 5 MG: 5 TABLET ORAL at 02:58

## 2023-01-28 RX ADMIN — IBUPROFEN 800 MG: 800 TABLET, FILM COATED ORAL at 14:55

## 2023-01-28 ASSESSMENT — PAIN DESCRIPTION - DESCRIPTORS
DESCRIPTORS: ACHING;DISCOMFORT;SORE

## 2023-01-28 ASSESSMENT — PAIN DESCRIPTION - LOCATION
LOCATION: ABDOMEN;INCISION
LOCATION: ABDOMEN;INCISION;BACK
LOCATION: ABDOMEN;INCISION
LOCATION: ABDOMEN;INCISION

## 2023-01-28 ASSESSMENT — PAIN SCALES - GENERAL
PAINLEVEL_OUTOF10: 4
PAINLEVEL_OUTOF10: 4
PAINLEVEL_OUTOF10: 2
PAINLEVEL_OUTOF10: 3
PAINLEVEL_OUTOF10: 2
PAINLEVEL_OUTOF10: 4
PAINLEVEL_OUTOF10: 3

## 2023-01-28 ASSESSMENT — PAIN DESCRIPTION - ORIENTATION
ORIENTATION: LOWER
ORIENTATION: LOWER;MID

## 2023-01-28 ASSESSMENT — PAIN - FUNCTIONAL ASSESSMENT
PAIN_FUNCTIONAL_ASSESSMENT: ACTIVITIES ARE NOT PREVENTED

## 2023-01-28 NOTE — PROGRESS NOTES
Obstetric/Gynecology Resident Interval Note    Writer to bedside secondary to patient requesting pressure dressing removal.  Pressure dressing was applied to the epidural site on 1/25/2023 in [address] to bleeding from the epidural site. Pressure dressing removed, no active bleeding or discharge for home the epidural site. Epidural site is minimally tender to palpation, otherwise unremarkable. Continue to monitor for bleeding, if any additional bleeding, will contact anesthesia. Senior resident updated and in agreement with plan.       Beryl Cardenas MD  OB/GYN Resident, PGY1  [facility]  1/28/2023, 12:11 AM

## 2023-01-28 NOTE — PROGRESS NOTES
POST OPERATIVE DAY # 3    Blair James is a 29 y.o. female   This patient was seen and examined today. Her pregnancy was complicated by:   Patient Active Problem List   Diagnosis    Anxiety and depression    Gastroesophageal reflux disease    Dichorionic diamniotic twin pregnancy     IVF pregnancy    FGR Baby A and B     Intrahepatic cholestasis of pregnancy in third trimester    Celestone x  &     PLTCS 23 M Apg 8/9 Wt 4#11, F Apg 8/9 Wt 3#13    Postpartum care following  delivery       Today she is doing well without any chief complaint. Her lochia is light. She denies chest pain, shortness of breath, headache, and lightheadedness. She is  breast feeding and she denies any signs or symptoms of mastitis. She is ambulating well. She is voiding without difficulty. She currently denies S/S of postpartum depression. Flatus present. Bowel movement absent. She is tolerating solids.     Vital Signs:  Vitals:    23 1200 23 1602 23 1934 23 2224   BP: (!) 131/96 130/89 125/88    Pulse: 62 68 79    Resp: 16 16 17 16   Temp: 98.2 °F (36.8 °C) 97.9 °F (36.6 °C) 98.1 °F (36.7 °C)    TempSrc: Oral Oral Oral    SpO2:   97%          Urine Input & Output last 24hrs:   No intake or output data in the 24 hours ending 23 0059    Physical Exam:  General:  no apparent distress, alert, and cooperative  Neurologic:  alert, oriented, normal speech, no focal findings or movement disorder noted  Lungs:  No increased work of breathing, good air exchange, clear to auscultation bilaterally, no crackles or wheezing  Heart:  regular rate and rhythm    Abdomen: abdomen soft, non-distended, non-tender  Fundus: non-tender, normal size, firm, below umbilicus  Incision: clean, dry, and silver dressing in place  Extremities:  no calf tenderness, non edematous    Labs:  Lab Results   Component Value Date    WBC 13.7 (H) 2023    HGB 10.3 (L) 2023    HCT 30.8 (L) 2023    MCV 90.7 2023     2023       Assessment/Plan:  Eloise Solomon is a  POD # 2 s/p PLTCS   - Doing well, VSS    - male infant in [address], circumcision completed, female infant in NICU   - Encourage ambulation and use of incentive spirometer   - CBC post op was 7.8, received one unit secondary to symptomatic anemia, post transfusion Hgb 10.3  Rh positive/Rubella immune  Bottle feeding   Pre-E w/o SF    - Mostly normotensive, occasionally hypertensive not severe range   - Pre-E labs WNL, P/C 1.35  Acute Blood Loss Anemia   - 7.8 post-op, received 1 U and improved to 10.3   Anxiety/Depression    - Stable- no meds   - Denies SI/HI  Bleeding from epidural site  - s/p pressure dressing  - no longer bleeding  Continue post-op care. Counseling Completed:  Secondary Smoke risks and Sudden Infant Death Syndrome were reviewed with recommendations. Infant sleeping, \"back to sleep\" and avoidance of co-sleeping recommendations were reviewed. Signs and Symptoms of Post Partum Depression were reviewed. The patient is to call if any occur. Signs and symptoms of Mastitis were reviewed. The patient is to call if any occur for follow up.   Discharge instructions including pelvic rest, incision care, 15 lb weight restriction, no driving with pain medicine and office follow-up were reviewed with patient     Attending Physician: Dr. Molly Radford DO   Ob/Gyn Resident  2023, 12:59 AM

## 2023-01-28 NOTE — PROGRESS NOTES
POST OPERATIVE DAY # 4     Anabella Park is a 29 y.o. female   This patient was seen and examined today. Today she is doing well without any chief complaint. Her lochia is light. She denies chest pain, shortness of breath, headache, lightheadedness, blurred vision and peripheral edema. She is breast feeding and she denies any signs or symptoms of mastitis. She is  ambulating well. She is voiding without difficulty. She currently denies S/S of postpartum depression. Flatus present. Bowel movement absent. She is tolerating solids.     Vital Signs:  Vitals:    23 1200 23 1600 230 23   BP: 128/89 126/89 (!) 128/93    Pulse: 82 79 96    Resp: 18 18 18 17   Temp: 97.9 °F (36.6 °C) 97.9 °F (36.6 °C) 98.2 °F (36.8 °C)    TempSrc: Oral Oral Oral    SpO2: 97%  93%          Urine Input & Output last 24hrs:   No intake or output data in the 24 hours ending 23 0202    Physical Exam:  General:  no apparent distress, alert and cooperative  Neurologic:  alert, oriented, normal speech, no focal findings or movement disorder noted  Lungs:  No increased work of breathing, good air exchange, clear to auscultation bilaterally, no crackles or wheezing  Heart:  Regular rate and rhythm, normal S1 and S2  Abdomen: abdomen soft, non-distended, non-tender  Fundus: non-tender, firm, below umbilicus  Incision: Silver dressing in place with minimal saturation   Extremities:  no calf tenderness, non edematous    Labs:  Lab Results   Component Value Date    WBC 13.7 (H) 2023    HGB 10.3 (L) 2023    HCT 30.8 (L) 2023    MCV 90.7 2023     2023       Assessment/Plan:  Anabella Park is a  POD # 4 s/p PLTCS   - Doing well, VSS    - male infant in [address], circumcision completed; female infant in NICU   - Encourage ambulation and use of incentive spirometer   - D/C lynn catheter and saline lock IV on POD #1    - CBC completed; Hgb 7.8 on POD#1    - S/p 1u pRBCs; repeat Hgb 10.3   - Motrin/Tylenol/Clarisse for pain control    Rh positive/Rubella immune   - Rhogam and MMR not indicated    Bottle feeding   - Denies s/s mastitis    PreE w/o SF   - BP elevated non severe, overall reassuring   - Denies s/s PreE    - PreE labs wnl, P/C 1.35 (1/24)   - Will need BP check in one week   -  Continue to monitor closely    Anemia   - VSS    - Hgb 7.8 >1u>10.3   - PO iron supplementation prescribed upon discharge    Anxiety/Depression   - Stable on no medication   - Denies SI/HI or s/s PPD    BMI 26    Continue post-op care. Counseling Completed:  Secondary Smoke risks and Sudden Infant Death Syndrome were reviewed with recommendations. Infant sleeping, \"back to sleep\" and avoidance of co-sleeping recommendations were reviewed. Signs and Symptoms of Post Partum Depression were reviewed. The patient is to call if any occur. Signs and symptoms of Mastitis were reviewed. The patient is to call if any occur for follow up.   Discharge instructions including pelvic rest, incision care, 15 lb weight restriction, no driving with pain medicine and office follow-up were reviewed with patient     Attending Physician: Dr. Jonn Gómez MD  Ob/Gyn Resident  1/29/2023, 2:02 AM

## 2023-01-28 NOTE — PLAN OF CARE
Problem: Pain  Goal: Verbalizes/displays adequate comfort level or baseline comfort level  2023 by Ilda Blake RN  Outcome: Progressing  2023 by Vera Coker RN  Outcome: Progressing     Problem: Postpartum  Goal: Experiences normal postpartum course  Description:  Postpartum OB-Pregnancy care plan goal which identifies if the mother is experiencing a normal postpartum course  2023 by Ilda Blake RN  Outcome: Progressing  2023 by Vera Coker RN  Outcome: Progressing  Goal: Appropriate maternal -  bonding  Description:  Postpartum OB-Pregnancy care plan goal which identifies if the mother and  are bonding appropriately  2023 by Ilda Blake RN  Outcome: Progressing  2023 by Vera Coker RN  Outcome: Progressing  Goal: Establishment of infant feeding pattern  Description:  Postpartum OB-Pregnancy care plan goal which identifies if the mother is establishing a feeding pattern with their   2023 by Ilda Blake RN  Outcome: Progressing  2023 by Vera Coker RN  Outcome: Progressing  Goal: Incisions, wounds, or drain sites healing without S/S of infection  2023 by Ilda Blake RN  Outcome: Progressing  2023 by Vera Coker RN  Outcome: Progressing     Problem: Infection - Adult  Goal: Absence of infection at discharge  2023 by Ilda Blake RN  Outcome: Progressing  2023 by Vera Coker RN  Outcome: Progressing  Goal: Absence of infection during hospitalization  2023 by Ilda Blake RN  Outcome: Progressing  2023 by Vera Coker RN  Outcome: Progressing  Goal: Absence of fever/infection during anticipated neutropenic period  2023 by Ilda Blake RN  Outcome: Progressing  2023 by Vera Coker RN  Outcome: Progressing     Problem: Safety - Adult  Goal: Free from fall  injury  1/27/2023 2247 by Ivonne Fernandez RN  Outcome: Progressing  1/27/2023 1911 by Faiza Sweeney RN  Outcome: Progressing     Problem: Discharge Planning  Goal: Discharge to home or other facility with appropriate resources  1/27/2023 2247 by Ivonne Fernandez RN  Outcome: Progressing  1/27/2023 1911 by Faiza Sweeney RN  Outcome: Progressing

## 2023-01-28 NOTE — PROGRESS NOTES
Obstetrical Rounds:    POD#: 3  Hospital Day: 4  Procedure: primary  section    Date: 2023  Time: 1:33 PM        Patient Name: Magdalene Granados  Patient : 1990  Room/Bed: 6641/4402-19  Admission Date/Time: 2023  3:45 PM  MRN #: [id number]  [facility] #: [id number]        Attending Physician Statement  I have discussed the care of Magdalene Granados, including pertinent history and exam findings,  with the resident. I have reviewed their note in the electronic medical record. I have seen and examined the patient and the key elements of all parts of the encounter have been performed/reviewed by me . I agree with the assessment, plan and orders as documented by the resident. Pt seen & examined. Pt without c/c. Pt ambulating & urinating without difficulty. Plan discharge tomorrow    Vitals:    23 0800   BP: (!) 144/90   Pulse: 71   Resp: 18   Temp: 98.2 °F (36.8 °C)   SpO2:        Admission on 2023   Component Date Value Ref Range Status    Specimen Description 2023 . VAGINA   Final    Culture 2023 NEGATIVE FOR GROUP B STREPTOCOCCI   Final    WBC 2023 10.4  3.5 - 11.3 k/uL Final    RBC 2023 3.63 (A)  3.95 - 5.11 m/uL Final    Hemoglobin 2023 11.0 (A)  11.9 - 15.1 g/dL Final    Hematocrit 2023 33.7 (A)  36.3 - 47.1 % Final    MCV 2023 92.8  82.6 - 102.9 fL Final    MCH 2023 30.3  25.2 - 33.5 pg Final    MCHC 2023 32.6  28.4 - 34.8 g/dL Final    RDW 2023 12.5  11.8 - 14.4 % Final    Platelets  150  138 - 453 k/uL Final    MPV 2023 12.8  8.1 - 13.5 fL Final    NRBC Automated 2023 0.0  0.0 per 100 WBC Final    Seg Neutrophils 2023 69 (A)  36 - 65 % Final    Lymphocytes 2023 19 (A)  24 - 43 % Final    Monocytes 2023 9  3 - 12 % Final    Eosinophils % 2023 2  1 - 4 % Final    Basophils 2023 0  0 - 2 % Final    Immature Granulocytes 2023 1 (A)  0 % Final    Segs Absolute 01/24/2023 7.20  1.50 - 8.10 k/uL Final    Absolute Lymph # 01/24/2023 2.03  1.10 - 3.70 k/uL Final    Absolute Mono # 01/24/2023 0.93  0.10 - 1.20 k/uL Final    Absolute Eos # 01/24/2023 0.17  0.00 - 0.44 k/uL Final    Basophils Absolute 01/24/2023 0.04  0.00 - 0.20 k/uL Final    Absolute Immature Granulocyte 01/24/2023 0.07  0.00 - 0.30 k/uL Final    Glucose 01/24/2023 95  70 - 99 mg/dL Final    BUN 01/24/2023 14  6 - 20 mg/dL Final    Creatinine 01/24/2023 0.62  0.50 - 0.90 mg/dL Final    Est, Glom Filt Rate 01/24/2023 >60  >60 mL/min/1.73m2 Final    Comment:       Effective Oct 3, 2022        These results are not intended for use in patients <25years of age. eGFR results are calculated without a race factor using the 2021 CKD-EPI equation. Careful clinical correlation is recommended, particularly when comparing to results   calculated using previous equations. The CKD-EPI equation is less accurate in patients with extremes of muscle mass, extra-renal   metabolism of creatine, excessive creatine ingestion, or following therapy that affects   renal tubular secretion. Calcium 01/24/2023 8.9  8.6 - 10.4 mg/dL Final    Sodium 01/24/2023 135  135 - 144 mmol/L Final    Potassium 01/24/2023 3.8  3.7 - 5.3 mmol/L Final    Chloride 01/24/2023 102  98 - 107 mmol/L Final    CO2 01/24/2023 20  20 - 31 mmol/L Final    Anion Gap 01/24/2023 13  9 - 17 mmol/L Final    Alkaline Phosphatase 01/24/2023 127 (A)  35 - 104 U/L Final    ALT 01/24/2023 10  5 - 33 U/L Final    AST 01/24/2023 15  <32 U/L Final    Total Bilirubin 01/24/2023 0.2 (A)  0.3 - 1.2 mg/dL Final    Total Protein 01/24/2023 5.9 (A)  6.4 - 8.3 g/dL Final    Albumin 01/24/2023 3.0 (A)  3.5 - 5.2 g/dL Final    Albumin/Globulin Ratio 01/24/2023 1.0  1.0 - 2.5 Final    Total Protein, Urine 01/24/2023 136  mg/dL Final    No normal range established.     Creatinine, Ur 01/24/2023 101.1  28.0 - 217.0 mg/dL Final    Urine Total Protein Creatinine Rat* 01/24/2023 1.35 (A)  0.00 - 0.20 Final    T. pallidum, IgG 01/24/2023 NONREACTIVE  NONREACTIVE Final    Comment:       T. pallidum antibodies are not detected. There is no serological evidence of infection with T. pallidum (early primary syphilis   cannot be excluded). Retest in 2-4 weeks if syphilis is clinically suspect. Expiration Date 01/24/2023 01/27/2023,2359   Final    Arm Band Number 01/24/2023 BE [id number]   Final    ABO/Rh 01/24/2023 A POSITIVE   Final    Antibody Screen 01/24/2023 NEGATIVE   Final    Unit Number 01/24/2023 [id number]   Final    Product Code 01/24/2023 Leukocyte Reduced Red Cell   Final    Unit Divison 01/24/2023 00   Final    Dispense Status 01/24/2023 TRANSFUSED   Final    Unit Issue Date/Time 01/24/2023 [id number]   Final    Product Code Blood Bank 01/24/2023 [id number]   Final    Blood Bank Unit Type and Rh 01/24/2023 A POS   Final    Blood Bank ISBT Product Blood Type 01/24/2023 6200   Final    Blood Bank Blood Product Expiratio* 01/24/2023 [id number]   Final    Transfusion Status 01/24/2023 OK TO TRANSFUSE   Final    Crossmatch Result 01/24/2023 COMPATIBLE   Final    Protime 01/25/2023 10.2  9.1 - 12.3 sec Final    INR 01/25/2023 0.9   Final    Comment:       Therapeutic Range:    Moderate Anticoagulant Intensity: INR = 2.0-3.0   High Anticoagulant Intensity: INR = 2.5-3.5      WBC 01/26/2023 13.7 (A)  3.5 - 11.3 k/uL Final    RBC 01/26/2023 2.58 (A)  3.95 - 5.11 m/uL Final    Hemoglobin 01/26/2023 7.8 (A)  11.9 - 15.1 g/dL Final    Hematocrit 01/26/2023 23.4 (A)  36.3 - 47.1 % Final    MCV 01/26/2023 90.7  82.6 - 102.9 fL Final    MCH 01/26/2023 30.2  25.2 - 33.5 pg Final    MCHC 01/26/2023 33.3  28.4 - 34.8 g/dL Final    RDW 01/26/2023 12.5  11.8 - 14.4 % Final    Platelets 74/83/9542 139  138 - 453 k/uL Final    MPV 01/26/2023 12.6  8.1 - 13.5 fL Final    NRBC Automated 01/26/2023 0.0  0.0 per 100 WBC Final    Seg Neutrophils 01/26/2023 72 (A)  36 - 65 % Final Lymphocytes 2023 18 (A)  24 - 43 % Final    Monocytes 2023 9  3 - 12 % Final    Eosinophils % 2023 0 (A)  1 - 4 % Final    Basophils 2023 0  0 - 2 % Final    Immature Granulocytes 2023 1 (A)  0 % Final    Segs Absolute 2023 9.85 (A)  1.50 - 8.10 k/uL Final    Absolute Lymph # 2023 2.49  1.10 - 3.70 k/uL Final    Absolute Mono # 2023 1.24 (A)  0.10 - 1.20 k/uL Final    Absolute Eos # 2023 0.06  0.00 - 0.44 k/uL Final    Basophils Absolute 2023 0.03  0.00 - 0.20 k/uL Final    Absolute Immature Granulocyte 2023 0.07  0.00 - 0.30 k/uL Final    Fibrinogen 2023 264  140 - 420 mg/dL Final    PTT 2023 23.9  20.5 - 30.5 sec Final    Comment:       IV Heparin Therapy Range:  48.6-77.8      Surgical Pathology Report 2023    Final                    Value:-- Diagnosis --  Placenta (608 g), primary :  Dichorionic diamniotic fused twin placentae. Velamentous insertion of unclamped three-vessel umbilical cord and  corresponding portion of disc with third trimester villous morphology  and partial circummarginate insertion of membrane. Clamped three-vessel umbilical cord and corresponding portion of disc  with mature third trimester villous morphology.       Laith Fletcher  **Electronically Signed Out**                Clinical Information  Pre-op Diagnosis:  30 Y/O  @ 35W 5D WITH [place] TWINS, NEEDED   SECTION FOR DECREASED FETAL MOVEMENT AND OLIGOHYDRAMNIOS OF  BABY A, FETAL GROWTH RESTRICTION x 2 AND PRE-ECLAMPSIA WITHOUT SEVERE  FEATURES    Operative Findings:  PRIMARY LOW TRANSVERSE C/S, DELIVERY OF DI-DI  TWINS, BABY A  M, AP/9, WT: 4LB 11OZ, BABY B  F, AP/9, WT: 3LB  13OZ, PLACENTA, CORD, MEMBRANES OF BABY A AND BABY B  tm    Source of Specimen  A: PLACENTA, CORD, AND MEMBRANES OF BABY A                           - BABY B    Gross Description  \"GALLITO MILLSESON, UNDESIGNATED\" Fused twin placenta with attached  membranes and umbilical cord segments. Attached to one cord is a  clamp that is undesignated. The cord without clamp is 13.0 cm long x  1.3 cm in diameter, trivascular (although one artery appears pinpoint)  with a velamentous insertion, 7.5 cm from the nearest disc margin. The clamped cord segment is 16.0 cm long x 3.0 cm in diameter,  trivascular with a paracentral insertion. The membranes are  purple-gray, focally opacified and the side of the disc corresponding  to the cord without clamp has a partial circummarginate insertion over  60% of the disc. The remaining insertion is marginal, including on  the side of the disc of the clamped cord. The 608 gram, 26.0 x 15.0 x  3.0 cm disc has a normal array of surface vessels. There are no  superficial vascular anastomoses. Subchorionic fibrin is marginal and  involves approximately 5% of the disc. The maternal surface is  coarsely                           lobulated and focally disrupted (approximately 10%) and  completeness cannot be determined. Sectioning reveals dark red  parenchyma without focal lesions. Cassette summary:  \"1\" dividing  membrane and insertion, \"2-4\" sections corresponding to cord without  clamp, \"5-8\" sections corresponding to clamped cord side.   tm      Microscopic Description       SIDE WITHOUT CLAMP  Umbilical cord:     Unremarkable, velamentous insertion       Membranes:     Partial circummarginate insertion over 60% of the disc  Meconium staining:     No  Infarcts:     No  Intervillous thrombi:     No  Subchorionic fibrin:     Not significantly increased  Villous maturation:     Appropriate  Nucleated erythrocytes in     villous capillaries:     Not increased  Other:     Few microcalcifications         SIDE WITH CLAMP  Umbilical cord:     Unremarkable, paracentral insertion       Membranes:     Unremarkable, marginal insertion  Meconium staining:     No  Infarcts:     No  Intervillous thrombi:     No  [employer] onic fibrin:     Not significantly increased  Villous maturation:     Appropriate  Nucleated erythrocytes in     villous capillaries:     Not increased  Other:     Few microcalcifications      SURGICAL PATHOLOGY CONSULTATION       Patient Name: Zachary Litten: [id number]  Path Number: [id number]    [address] [place] [address],  [address]. [facility],  [address]  [phone]  Fax: [phone]      Hemoglobin 2023 10.3 (A)  11.9 - 15.1 g/dL Final    Hematocrit 2023 30.8 (A)  36.3 - 47.1 % Final    Iron 2023 161 (A)  37 - 145 ug/dL Final    TIBC 2023 562 (A)  250 - 450 ug/dL Final    Iron Saturation 2023 29  20 - 55 % Final    UIBC 2023 401 (A)  112 - 347 ug/dL Final    Ferritin 2023 28  13 - 150 ng/mL Final       [facility]  [facility] [address] [phone]      Discharge Instructions for  Birth     During a  section (), an incision is made in the abdomen and uterus (womb) to deliver the baby. The normal hospital stay is 2-4 days. Steps to Take   Home Care    For the first 1-2 weeks, ask for someone to help you at home. Let people help you. Take frequent rest breaks. For vaginal bleeding, use extra absorbent pads. Keep the incision area clean and dry. Ask your doctor about when it is safe to shower, bathe, or soak in water. Avoid heavy lifting for six weeks. Diet    After a  birth, you will start with a clear liquid diet. Examples include: Jell-o, broth, and ginger ale. If you tolerate that, you can slowly go back to your regular diet. Stay away from anything greasy or spicy right after surgery. These types of foods can upset your stomach. Drink lots of fluids to prevent constipation. Physical Activity    Do not lift anything heavier than your baby.     When shifting positions, use a pillow to support the area where the incisions were made. Get up slowly. This will help you to avoid feeling dizzy or light headed. Try to move around each day. Light physical activity will help with your recovery. Ask your doctor when you will be able to go back to work. Do not drive unless your doctor has given you permission to do so. Do not drive if you are taking prescription pain medicine. Ask your doctor when you will be able to resume sexual activity. If you have not done so already, talk to your doctor about birth control options. Medications    Your doctor may recommend pain medicine to ease discomfort. If you are taking medicines, follow these general guidelines:   Take your medicine as directed. Do not change the amount or the schedule. Do not stop taking them without talking to your doctor. Do not share them. Know what the results and side effects. Report them to your doctor. Some drugs can be dangerous when mixed. Talk to a doctor or pharmacist if you are taking more than one drug. This includes over-the-counter medicine and herb or dietary supplements. Plan ahead for refills so you don't run out. Lifestyle Changes    You and your doctor will plan lifestyle changes that will help you recover. To get encouragement and learn strategies, consider joining a support group for new mothers. Follow-up   Make a follow-up appointment as directed by your doctor.    Call Your Doctor If Any of the Following Occurs   After you leave the hospital, call your doctor if any of the following occurs:   Signs of infection, including fever and chills   Heavy vaginal bleeding   Foul-smelling vaginal discharge   Excessive bleeding, redness, swelling, increasing pain or discharge from the incision site   Nausea and/or vomiting that you cannot control with the medicines you were given after surgery, or which persist for more than two days after discharge from the hospital   Pain that you cannot control with the medicines you have been given   Swelling and/or pain in one or both legs   Cough, shortness of breath, or chest pain   Joint pain, fatigue, stiffness, rash, or other new symptoms   Become dizzy or faint   If you think you have an emergency,  CALL 911  . Home care, Restrictions,  Follow up Care, and birth control review completed    RTO 2 weeks    Secondary Smoke risks and Sudden Infant Death Syndrome were reviewed with recommendations. Cessation options discussed. Signs and Symptoms of Post Partum Depression were reviewed. The patient is to call if any occur. Signs and symptoms of Mastitis were reviewed. The patient is to call if any occur for follow up.       Attending's Name:  Electronically signed by Suresh Finn DO on 1/28/2023 at 1:33 PM

## 2023-01-29 VITALS
HEART RATE: 93 BPM | OXYGEN SATURATION: 93 % | TEMPERATURE: 98.1 F | SYSTOLIC BLOOD PRESSURE: 139 MMHG | RESPIRATION RATE: 18 BRPM | DIASTOLIC BLOOD PRESSURE: 101 MMHG

## 2023-01-29 PROCEDURE — 6370000000 HC RX 637 (ALT 250 FOR IP): Performed by: STUDENT IN AN ORGANIZED HEALTH CARE EDUCATION/TRAINING PROGRAM

## 2023-01-29 RX ORDER — IBUPROFEN 800 MG/1
800 TABLET ORAL EVERY 8 HOURS PRN
Qty: 60 TABLET | Refills: 0 | Status: SHIPPED | OUTPATIENT
Start: 2023-01-29

## 2023-01-29 RX ORDER — OXYCODONE HYDROCHLORIDE 5 MG/1
5 TABLET ORAL EVERY 6 HOURS PRN
Qty: 20 TABLET | Refills: 0 | Status: SHIPPED | OUTPATIENT
Start: 2023-01-29 | End: 2023-02-05

## 2023-01-29 RX ORDER — LANOLIN ALCOHOL/MO/W.PET/CERES
325 CREAM (GRAM) TOPICAL 2 TIMES DAILY
Qty: 90 TABLET | Refills: 3 | Status: SHIPPED | OUTPATIENT
Start: 2023-01-29

## 2023-01-29 RX ORDER — SENNA AND DOCUSATE SODIUM 50; 8.6 MG/1; MG/1
1 TABLET, FILM COATED ORAL DAILY
Qty: 30 TABLET | Refills: 0 | Status: SHIPPED | OUTPATIENT
Start: 2023-01-29

## 2023-01-29 RX ADMIN — ACETAMINOPHEN 1000 MG: 500 TABLET ORAL at 04:48

## 2023-01-29 RX ADMIN — IBUPROFEN 800 MG: 800 TABLET, FILM COATED ORAL at 08:54

## 2023-01-29 RX ADMIN — OXYCODONE HYDROCHLORIDE 5 MG: 5 TABLET ORAL at 09:00

## 2023-01-29 RX ADMIN — POLYETHYLENE GLYCOL 3350 17 G: 17 POWDER, FOR SOLUTION ORAL at 08:55

## 2023-01-29 RX ADMIN — CYCLOBENZAPRINE 10 MG: 10 TABLET, FILM COATED ORAL at 00:30

## 2023-01-29 RX ADMIN — ACETAMINOPHEN 1000 MG: 500 TABLET ORAL at 13:36

## 2023-01-29 RX ADMIN — OXYCODONE HYDROCHLORIDE 5 MG: 5 TABLET ORAL at 04:49

## 2023-01-29 RX ADMIN — IBUPROFEN 800 MG: 800 TABLET, FILM COATED ORAL at 00:30

## 2023-01-29 RX ADMIN — DOCUSATE SODIUM 100 MG: 100 CAPSULE ORAL at 08:55

## 2023-01-29 ASSESSMENT — PAIN SCALES - GENERAL
PAINLEVEL_OUTOF10: 4
PAINLEVEL_OUTOF10: 3
PAINLEVEL_OUTOF10: 4

## 2023-01-29 ASSESSMENT — PAIN DESCRIPTION - LOCATION
LOCATION: ABDOMEN;HEAD
LOCATION: ABDOMEN;INCISION
LOCATION: ABDOMEN;HEAD
LOCATION: ABDOMEN;INCISION;BACK
LOCATION: ABDOMEN

## 2023-01-29 ASSESSMENT — PAIN DESCRIPTION - DESCRIPTORS
DESCRIPTORS: ACHING;DISCOMFORT;SORE
DESCRIPTORS: ACHING;DISCOMFORT;SORE
DESCRIPTORS: ACHING

## 2023-01-29 ASSESSMENT — PAIN DESCRIPTION - ORIENTATION
ORIENTATION: MID;LOWER
ORIENTATION: LOWER;MID

## 2023-01-30 ENCOUNTER — TELEPHONE (OUTPATIENT)
Dept: OBGYN CLINIC | Age: 33
End: 2023-01-30

## 2023-01-30 NOTE — TELEPHONE ENCOUNTER
----- Message from Saul Morataya DO sent at 1/29/2023 12:17 PM EST -----  Pt delivered. Please follow up on how patient is recovering and make sure has appointments for dressing removal and wound check as well as postpartum visits. Thank you.

## 2023-01-30 NOTE — TELEPHONE ENCOUNTER
Pt scheduled for bandage removal on 2/1 will schedule PP visit at that time. Pt states she is feeling/ healing well.

## 2023-02-01 ENCOUNTER — NURSE ONLY (OUTPATIENT)
Dept: OBGYN CLINIC | Age: 33
End: 2023-02-01

## 2023-02-01 VITALS
SYSTOLIC BLOOD PRESSURE: 116 MMHG | BODY MASS INDEX: 22.94 KG/M2 | WEIGHT: 142.13 LBS | DIASTOLIC BLOOD PRESSURE: 64 MMHG

## 2023-02-01 DIAGNOSIS — Z48.01 DRESSING CHANGE OR REMOVAL, SURGICAL WOUND: ICD-10-CM

## 2023-02-01 PROCEDURE — 99024 POSTOP FOLLOW-UP VISIT: CPT | Performed by: ADVANCED PRACTICE MIDWIFE

## 2023-02-01 NOTE — PROGRESS NOTES
Pt was her for 1 wk bandage removal after C/S- bandage was removed with ease- steri strips noted underneath with dried blood noted underneath of those- incision intake- skin irritated at the ends of the bandage- minimal brusing noted on above and below incision. Incision was cleaned with peroxide and only 1 steri strip was needed to be replied. Pt aware to keep incision clean, cool and dry- showers only- pat dry- lifting restriction still in place- pt to return to office in 1 week for recheck of incision and pp visit-     Pt stated she is not tolerating oral iron and mentioned dr hannah stated she can have a referral placed for iron infusion- Referral was placed and awaiting Dr Shirley Turk.

## 2023-02-08 ENCOUNTER — POSTPARTUM VISIT (OUTPATIENT)
Dept: OBGYN CLINIC | Age: 33
End: 2023-02-08

## 2023-02-08 VITALS
WEIGHT: 139 LBS | SYSTOLIC BLOOD PRESSURE: 110 MMHG | HEIGHT: 66 IN | DIASTOLIC BLOOD PRESSURE: 60 MMHG | BODY MASS INDEX: 22.34 KG/M2

## 2023-02-08 NOTE — PROGRESS NOTES
[address] [facility]  [place]  [address] [address]  Dept: [phone]  Patient Name: Kasi Watkins  Patient Age: 29 y.o. Date of Visit: 2023    SUBJECTIVE:    Chief Complaint:  Chief Complaint   Patient presents with    Postpartum Care       HPI:  DELIVERY DATE: 23  TYPE OF DELIVERY: primary  section  PROVIDER: Ella Gupta   PERINEUM: n/a    Qing Francois was seen for her 2 week visit. Her female and male infants are doing well, home. She is bottle feeding. (Pumping)  She is experiencing pain. She is rating her pain 1  She reports her lochia is staining only  She reports no perineal discomfort. She denies urinary incontinence. Her bowels have returned to her normal pattern. Qing Francois has not engaged in intercourse. She does not report a mood disorder. She feels she is not having difficulty coping. Qing Francois feels her family adjustment is effective. She reports an overall positive birth experience.   In the past 7 days:  I have been able to laugh and see the funny side of things: As much as I always could  I have looked forward with enjoyment to things: As much as I ever did  I have blamed myself unnecessarily when things went wrong: Not very often  I have been anxious or worried for no good reason: Yes, sometimes  I have felt scared or panicky for no good reason: Yes, sometimes  I haven't been able to cope lately: No, most of the time I have coped quite well  I have been so unhappy that I have had difficulty sleeping: Not at all  I have felt sad or miserable: No, not at all  I have been so unhappy that I have been crying: No, never  The thought of harming myself has occurred to me: Never  Total: 6      OBJECTIVE:   Wt Readings from Last 3 Encounters:   23 139 lb (63 kg)   23 142 lb 2 oz (64.5 kg)   23 165 lb (74.8 kg)       Blood pressure 110/60, height 5' 6\" (1.676 m), weight 139 lb (63 kg), last menstrual period 2022, currently breastfeeding.  incision- intact, well approximated, clean no erythema increased warmth or drainage, normal induration. ASSESSMENT/PLAN:  1. PLTCS 23 M Apg 8/9 Wt 4#11, F Apg 8/9 Wt 3#13    2. Postpartum care following  delivery    3. 2 weeks postpartum follow-up  Labs were not ordered. Return to the office in 4 weeks. She will return for 6 week PP visit  bottle feeding  Signs & Symptoms of mastitis reviewed; notify if occurs  Family planning/birth spacing needs  Family planning counseling was initiated/completed. Planned contraception : undecided  Smoker/non-smoker  Secondary smoke risks were reviewed, including increased risks of respiratory     problems, Sudden Infant Death Syndrome, and potential malignancies. Patient was seen with total face to face time 15 minutes.   More than 50% of this visit was counseling and education regarding Post Partum visit

## 2023-03-08 ENCOUNTER — POSTPARTUM VISIT (OUTPATIENT)
Dept: OBGYN CLINIC | Age: 33
End: 2023-03-08

## 2023-03-08 VITALS
SYSTOLIC BLOOD PRESSURE: 108 MMHG | DIASTOLIC BLOOD PRESSURE: 76 MMHG | HEIGHT: 66 IN | WEIGHT: 141 LBS | BODY MASS INDEX: 22.66 KG/M2

## 2023-03-08 PROCEDURE — 0503F POSTPARTUM CARE VISIT: CPT | Performed by: ADVANCED PRACTICE MIDWIFE

## 2023-03-08 NOTE — PROGRESS NOTES
[address] [facility] OB/GYN [facility]  [place]  [address] [address]  Dept: [phone]    Patient Name: Sydna Schaumann  Patient Age: 29 y.o. Date of Visit: 3/8/2023    Chief Complaint   Patient presents with    Postpartum Care     6 week PP       HPI:  DELIVERY DATE: 23  TYPE OF DELIVERY: primary  section  PROVIDER:   PERINEUM: n/a    Yemi Reyna was seen for her 6 week visit. Her twins boy and girl are doing well  She is breast and bottle feeding. She is breastfeeding without difficulty. She is not experiencing pain. She is rating her pain  0  She reports her lochia is staining only  She reports none perineal discomfort. She denies urinary incontinence. Her bowels have returned to her normal pattern. She is back to her normal activity pattern. She has not her first menses. Yemi Reyna has not engaged in intercourse. She does not report a mood disorder. She feels she is not having difficulty coping. Yemi Reyna feels her family adjustment is effective. She reports an overall positive birth experience.   In the past 7 days:  I have been able to laugh and see the funny side of things: As much as I always could  I have looked forward with enjoyment to things: As much as I ever did  I have blamed myself unnecessarily when things went wrong: Yes, some of the time  I have been anxious or worried for no good reason: Hardly ever  I have felt scared or panicky for no good reason: No, not much  I haven't been able to cope lately: No, I have been coping as well as ever  I have been so unhappy that I have had difficulty sleeping: Not at all  I have felt sad or miserable: No, not at all  I have been so unhappy that I have been crying: No, never  The thought of harming myself has occurred to me: Never  Total: 5      OBJECTIVE:   Wt Readings from Last 3 Encounters:   23 141 lb (64 kg)   23 139 lb (63 kg)   23 142 lb 2 oz (64.5 kg)       Blood pressure 108/76, height 5' 6\" (1.676 m), weight 141 lb (64 kg), not currently breastfeeding. Breast exam: deferred    Abdomen: Soft non-tender without guarding. There is diastasis present. The diastasis is 1 finger breaths of separation. Perineum: not inspected,    Extremities: No calf tenderness bilaterally. No edema. ASSESSMENT/PLAN:    6 week postpartum visit  She will return for annual  Labs were ordered. CBC c/t postoperative anemia   Date of last pap: 2/2023  accepted pelvic floor therapy referral. Reviewed recommendation for pelvic floor therapy in the postpartum period and benefits. Reviewed recommendations for patient to reach prepregnancy weight by 6-12 months postpartum with goal to be a normal BMI (18.5-24. 9)  breast and bottle feeding  Signs & Symptoms of mastitis reviewed; notify if occurs  Reviewed benefits of longer duration of breastfeeding is associated with improved maternal health, including lower risk of diabetes, hypertension, myocardial infarction, ovarian cancer, and breast cancer. Smoker/non-smoker:  Secondary smoke risks were reviewed, including increased risks of respiratory problems, Sudden Infant Death Syndrome, and potential malignancies. Smoking cessation counseling:  no  Family planning needs  Family planning counseling was completed. Patient was advices to avoid interpregnancy intervals shorter than 6 months. Reviewed recommendations for repeat pregnancy after 18 months.  to get vasectomy           Patient was seen with total face to face time of 15 minutes.   More than 50% of this visit was counseling and education regarding her Post Partum visit

## 2023-05-09 ENCOUNTER — OFFICE VISIT (OUTPATIENT)
Dept: OBGYN CLINIC | Age: 33
End: 2023-05-09
Payer: OTHER GOVERNMENT

## 2023-05-09 ENCOUNTER — HOSPITAL ENCOUNTER (OUTPATIENT)
Age: 33
Setting detail: SPECIMEN
Discharge: HOME | End: 2023-05-09

## 2023-05-09 VITALS — SYSTOLIC BLOOD PRESSURE: 110 MMHG | DIASTOLIC BLOOD PRESSURE: 60 MMHG | WEIGHT: 139 LBS | BODY MASS INDEX: 22.44 KG/M2

## 2023-05-09 DIAGNOSIS — Z01.419 VISIT FOR GYNECOLOGIC EXAMINATION: Primary | ICD-10-CM

## 2023-05-09 PROCEDURE — 99395 PREV VISIT EST AGE 18-39: CPT | Performed by: NURSE PRACTITIONER

## 2023-05-09 ASSESSMENT — ENCOUNTER SYMPTOMS
COUGH: 0
VOMITING: 0
ABDOMINAL PAIN: 0
NAUSEA: 0
SHORTNESS OF BREATH: 0
COLOR CHANGE: 0

## 2023-05-09 NOTE — PROGRESS NOTES
Pelvic exam pap smear collected and sent. Cultures sent No    Plan:   Collect pap   BSE reviewed, Mammogram ordered: no  STD prevention reviewed  Gardisil counseling completed for all patients 10-35 yo  Dyspareunia-Cultures declined   Reprinted referral if not accepted insurance let us know where insurance will accept and will place referral.   Offered vaginal estrogen declined. She will monitor if deep pelvic pain would recommend Pelvic US. Diet & Exercise reviewed with pt. Preventive  Health through PCP   RV prn/annual           Orders Placed This Encounter   Procedures    PAP SMEAR     Standing Status:   Future     Standing Expiration Date:   5/9/2024     Order Specific Question:   Collection Type     Answer: Thin Prep     Order Specific Question:   Prior Abnormal Pap Test     Answer:   No     Order Specific Question:   Screening or Diagnostic     Answer:   Screening     Order Specific Question:   HPV Requested? Answer:   Yes     Order Specific Question:   High Risk Patient     Answer:   N/A     No orders of the defined types were placed in this encounter. Patient given educational materials - seepatient instructions. Discussed use, benefit, and side effects of prescribed medications. All patient questions answered. Pt voiced understanding. Reviewed health maintenance. Instructed to continue current medications, diet and exercise. Patient agreedwith treatment plan. Follow up as directed.       Electronically signed by DENTON Diaz CNP on 5/9/2023at 10:51 AM

## 2023-05-11 LAB
HPV SAMPLE: NORMAL
HPV, GENOTYPE 16: NOT DETECTED
HPV, GENOTYPE 18: NOT DETECTED
HPV, HIGH RISK OTHER: NOT DETECTED
HPV, INTERPRETATION: NORMAL
SPECIMEN DESCRIPTION: NORMAL

## 2023-05-15 LAB — CYTOLOGY REPORT: NORMAL

## 2023-10-13 ENCOUNTER — TELEPHONE (OUTPATIENT)
Dept: OBGYN CLINIC | Age: 33
End: 2023-10-13

## 2023-10-30 ENCOUNTER — OFFICE VISIT (OUTPATIENT)
Dept: OBGYN CLINIC | Age: 33
End: 2023-10-30
Payer: OTHER GOVERNMENT

## 2023-10-30 VITALS
BODY MASS INDEX: 21.86 KG/M2 | DIASTOLIC BLOOD PRESSURE: 68 MMHG | WEIGHT: 136 LBS | HEIGHT: 66 IN | SYSTOLIC BLOOD PRESSURE: 112 MMHG

## 2023-10-30 DIAGNOSIS — Z76.89 ENCOUNTER TO ESTABLISH CARE: ICD-10-CM

## 2023-10-30 DIAGNOSIS — F32.A ANXIETY AND DEPRESSION: Primary | ICD-10-CM

## 2023-10-30 DIAGNOSIS — F32.A DEPRESSION, UNSPECIFIED DEPRESSION TYPE: ICD-10-CM

## 2023-10-30 DIAGNOSIS — F41.9 ANXIETY AND DEPRESSION: Primary | ICD-10-CM

## 2023-10-30 DIAGNOSIS — F41.8 POSTPARTUM ANXIETY: ICD-10-CM

## 2023-10-30 PROCEDURE — 99214 OFFICE O/P EST MOD 30 MIN: CPT | Performed by: NURSE PRACTITIONER

## 2023-10-30 ASSESSMENT — ANXIETY QUESTIONNAIRES
4. TROUBLE RELAXING: 3
1. FEELING NERVOUS, ANXIOUS, OR ON EDGE: 3
6. BECOMING EASILY ANNOYED OR IRRITABLE: 3
7. FEELING AFRAID AS IF SOMETHING AWFUL MIGHT HAPPEN: 3
GAD7 TOTAL SCORE: 21
3. WORRYING TOO MUCH ABOUT DIFFERENT THINGS: 3
IF YOU CHECKED OFF ANY PROBLEMS ON THIS QUESTIONNAIRE, HOW DIFFICULT HAVE THESE PROBLEMS MADE IT FOR YOU TO DO YOUR WORK, TAKE CARE OF THINGS AT HOME, OR GET ALONG WITH OTHER PEOPLE: VERY DIFFICULT
2. NOT BEING ABLE TO STOP OR CONTROL WORRYING: 3
5. BEING SO RESTLESS THAT IT IS HARD TO SIT STILL: 3

## 2023-10-30 NOTE — PROGRESS NOTES
minute duration appointment visit, Solange Shaw CNP spent at least 50% of the face-to-face time in counseling, explanation of diagnosis, planning of further management, and answering all questions.

## 2023-12-19 NOTE — PATIENT INSTRUCTIONS
After Hours Number: You can call the office [phone]  or [phone]  Call if you have:  1. Bleeding like a period  2. Cramps or contractions greater than 2 hours  3. If you are leaking fluid  4. If you've a fever greater than 100°  5. If you feel as if baby is not moving  6. If you have continuous vomiting over 3-4 hours   Counting Your Baby's Kicks: Care Instructions  Your Care Instructions    Counting your baby's kicks is one way your doctor can tell that your baby is healthy. Most women--especially in a first pregnancy--feel their baby move for the first time between 16 and 22 weeks. The movement may feel like flutters rather than kicks. Your baby may move more at certain times of the day. When you are active, you may notice less kicking than when you are resting. At your prenatal visits, your doctor will ask whether the baby is active. In your last trimester, your doctor may ask you to count the number of times you feel your baby move. Follow-up care is a key part of your treatment and safety. Be sure to make and go to all appointments, and call your doctor if you are having problems. It's also a good idea to know your test results and keep a list of the medicines you take. How do you count fetal kicks? A common method of checking your baby's movement is to count the number of kicks or moves you feel in 1 hour. Ten movements (such as kicks, flutters, or rolls) in 1 hour are normal. Some doctors suggest that you count in the morning until you get to 10 movements. Then you can quit for that day and start again the next day. Pick your baby's most active time of day to count. This may be any time from morning to evening. If you do not feel 10 movements in an hour, your baby may be sleeping. Wait for the next hour and count again. When should you call for help?   Call your doctor now or seek immediate medical care if:    You noticed that your baby has stopped moving or is moving much less than Subjective:      Patient ID: Melissa Petty is a 62 y.o. female.    Vitals:  oral temperature is 98.2 °F (36.8 °C). Her blood pressure is 134/84 and her pulse is 67. Her respiration is 18 and oxygen saturation is 95%.     Chief Complaint: Otalgia    Patient is a 62-year-old female who presents for evaluation of right ear pain.  Onset a week ago.  She was tried over-the-counter eardrops without relief.  She reports initially pressure now pain to the right ear.  She denies any associated fever, chills, nasal congestion, cough, dizziness, headache, drainage from the ear, foreign body to the ear.  No recent travel or sick contacts reported.  No other concerns voiced.    Otalgia   There is pain in the right ear. This is a new problem. The current episode started 1 to 4 weeks ago. The problem occurs constantly. The problem has been unchanged. There has been no fever. The pain is at a severity of 6/10. The pain is moderate. Pertinent negatives include no coughing, ear discharge, headaches or rash. She has tried ear drops for the symptoms. The treatment provided mild relief.       Constitution: Negative for fever.   HENT:  Positive for ear pain. Negative for ear discharge, foreign body in ear and congestion.    Respiratory:  Negative for cough.    Skin:  Negative for rash.   Neurological:  Negative for dizziness and headaches.      Objective:     Physical Exam   Constitutional: She is oriented to person, place, and time. She appears well-developed. She is cooperative.  Non-toxic appearance. She does not appear ill. No distress.   HENT:   Head: Normocephalic and atraumatic.   Ears:   Right Ear: Hearing and external ear normal. There is tenderness and cerumen present. No no drainage or swelling. No mastoid tenderness.   Left Ear: Hearing and external ear normal. There is cerumen present. No no drainage, swelling or tenderness. No mastoid tenderness.   Nose: Nose normal. No mucosal edema, rhinorrhea or nasal deformity. No  epistaxis. Right sinus exhibits no maxillary sinus tenderness and no frontal sinus tenderness. Left sinus exhibits no maxillary sinus tenderness and no frontal sinus tenderness.   Mouth/Throat: Uvula is midline, oropharynx is clear and moist and mucous membranes are normal. No trismus in the jaw. Normal dentition. No uvula swelling. No oropharyngeal exudate, posterior oropharyngeal edema or posterior oropharyngeal erythema.   Eyes: Conjunctivae and lids are normal. No scleral icterus.   Neck: Trachea normal and phonation normal. Neck supple. No edema present. No erythema present. No neck rigidity present.   Cardiovascular: Normal rate, regular rhythm, normal heart sounds and normal pulses.   Pulmonary/Chest: Effort normal and breath sounds normal. No respiratory distress. She has no decreased breath sounds. She has no rhonchi.   Abdominal: Normal appearance.   Musculoskeletal: Normal range of motion.         General: No deformity. Normal range of motion.   Neurological: She is alert and oriented to person, place, and time. She exhibits normal muscle tone. Coordination normal.   Skin: Skin is warm, dry, intact, not diaphoretic and not pale.   Psychiatric: Her speech is normal and behavior is normal. Judgment and thought content normal.   Nursing note and vitals reviewed.      Assessment:     1. Smoker    2. Right ear pain    3. Bilateral impacted cerumen        Plan:       Smoker    Right ear pain    Bilateral impacted cerumen  -     Ear Cerumen Removal    Other orders  -     ciprofloxacin-dexAMETHasone 0.3-0.1% (CIPRODEX) 0.3-0.1 % DrpS; Place 4 drops into both ears 2 (two) times daily.  Dispense: 7.5 mL; Refill: 0          Medical Decision Making:   Initial Assessment:   Nontoxic appearing 63 yo female c/o ear pain.  After complete evaluation, including thorough history and physical exam, the patient's symptoms consistent with a simple cerumen build up/impaction. There are no concerning features on physical exam to  normal.    Watch closely for changes in your health, and be sure to contact your doctor if you have any problems. Where can you learn more? Go to https://chpepiceweb.[email or web address] org and sign in to your [employer] account. Enter V758 in the [employer] box to learn more about \"Counting Your Baby's Kicks: Care Instructions. \"     If you do not have an account, please click on the \"Sign Up Now\" link. Current as of: September 5, 2018  Content Version: 12.0  © 8809-3185 [employer]. Care instructions adapted under license by [facility] ([facility]). If you have questions about a medical condition or this instruction, always ask your healthcare professional. [address] any warranty or liability for your use of this information. Preeclampsia: Care Instructions  Overview    Preeclampsia occurs when a woman's blood pressure rises during pregnancy. Often with preeclampsia, you also have swelling in your legs, hands, and face. A test may show too much protein in your urine. Preeclampsia is also called toxemia. If preeclampsia is severe and not treated, it can lead to seizures (eclampsia) and damage to your liver or kidneys. Preeclampsia can prevent your baby from getting enough food and oxygen. This can cause a low birth weight or other problems. Your doctor will watch you closely to prevent these problems. He or she also may recommend that you reduce your activity. If your preeclampsia is a danger to your health or the health of your baby, your doctor may need to deliver your baby early. While preeclampsia is a concern, most women with preeclampsia have healthy babies. After a woman gives birth, preeclampsia usually goes away on its own. But symptoms may last a few weeks or more and can get worse after delivery. Rarely, symptoms of preeclampsia don't show up until days or even weeks after childbirth. Follow-up care is a key part of your treatment and safety.  Be sure to make and go to all appointments, and call your doctor if you are having problems. It's also a good idea to know your test results and keep a list of the medicines you take. How can you care for yourself at home? Take and record your blood pressure at home if your doctor tells you to. Learn the importance of the two measures of blood pressure (such as 120 over 80, or 120/80). The first number is the systolic pressure, which is the force of blood on the artery walls as the heart pumps. The second number is the diastolic pressure, which is the force of blood on the artery walls between heartbeats, when the heart is at rest. You have a choice of monitors to use. Manual monitor: You pump up the cuff and use a stethoscope to listen for your pulse. Electronic monitor: The cuff inflates, and a gauge shows your pulse rate. To take your blood pressure:  Ask your doctor to check your blood pressure monitor to be sure that it is accurate and that the cuff fits you. Also ask your doctor to watch you to make sure that you are using it right. You should not eat, use tobacco products, or use medicine known to raise blood pressure (such as some nasal decongestant sprays) before you take your blood pressure. Avoid taking your blood pressure if you have just exercised. Also avoid taking it if you are nervous or upset. Rest at least 15 minutes before you take your blood pressure. If your doctor advises, check the protein levels in your urine. Your doctor or nurse will show you how to do this. Take your medicines exactly as prescribed. Call your doctor if you think you are having a problem with your medicine. Do not smoke. Quitting smoking will help improve your baby's growth and health. If you need help quitting, talk to your doctor about stop-smoking programs and medicines. These can increase your chances of quitting for good. Eat a balanced and healthy diet that has lots of fruits and vegetables.   If your doctor advised bed suggest bacterial otitis media/externa. Vital signs are stable. There is no evidence of ear trauma or TM rupture In clinic cerumen impaction/build up was removed using lavage. Patient tolerated well and was given instructions for supportive care.     Patient is noted to be a current smoker. Evidence shows that smoking causes cancer, heart disease, stroke, lung diseases, diabetes, and chronic obstructive pulmonary disease (COPD), which includes emphysema and chronic bronchitis. Smoking also increases risk for tuberculosis, certain eye diseases, and problems of the immune system, including rheumatoid arthritis. Furthermore smoking can slow the process of wound healing and prolong the symptoms of respiratory illness.  Patient counseled on dangers of smoking and offered smoking cessation.          Ear Cerumen Removal    Date/Time: 12/19/2023 2:45 PM    Performed by: Osiris Camara NP  Authorized by: Osiris Camara, NP    Consent Done?:  Yes (Verbal)    Local anesthetic:  None  Ceruminolytics applied: Ceruminolytics applied prior to the procedure    Medication Used:  Other  Location details:  Both ears  Procedure type: curette and irrigation    Cerumen  Removal Results:  Cerumen partially removed  Patient tolerance:  Patient tolerated the procedure well with no immediate complications     Visualized TM bilaterally without fluid or erythema. Right ear canal mildly erythematous.     Patient Instructions   Today in clinic we removed a wax build up from your ears. This can be caused by using q-tips, ear buds, or hearing aid which push the wax down into your ears. In addition tracie people naturally make more wax resulting in a build up.     To remove ear wax at home you can use OTC debrox and rinse with warm ding.     Your ears have been successfully flushed today. Evaluation after the procedure does not indicate there is an infection therefore you do not require antibiotics.     You may feel mild soreness after  ear wax removal. If you do take tylenol as needed for pain relief.    Return for any new or worsening symptoms.       Tobacco Cessation  Smoking, vaping, and smokeless tobacco cause harm by raising blood pressure and increasing your risk of heart attack and stroke. Chewing tobacco increases your risk of cancer of the face and throat. Smoking not only raises the risk of cancer, but more often, causes emphysema. This crippling lung disease can cause constant shortness of breath and a need to use oxygen. Stopping smoking can make the difference between playing with your grandchildren outside and sitting by with your oxygen tank watching them.     You may already know your nicotine habit is dangerous, but perhaps you feel helpless or dont know where to start.    Here at [facility] we are invested in the improvement of your health. We would be happy to help you with smoking cessation. If you are interested in quitting and answer yes to   the questions below, we can provide you with FREE assistance to get you on your way.     ? Are you a [place] resident?  ? Did you start smoking before September 1, 1988?  ? Are you ready to quit smoking?    Quitting doesnt have to be lonely -   [facility]s Smoking Cessation program offers a supportive environment along with      FREE smoking cessation counseling to help get you through those rough patches   FREE or reduced medications* to help curb the cravings    You do not need to be an [facility] patient to participate in the program.  Ready? Call [phone] or toll free [phone] or visit [email or web address] to sign up for the program.           rest, be sure to stay off your feet and rest as much as possible. Keep a phone, phone book, notepad, and pen near the bed where you can easily reach them. Gently stretch your legs every hour to maintain good blood flow. Have another family member pack snacks and lunch food in a cooler close to your bed. Use this time for activities that you usually cannot find time for, such as reading, craft projects, or letter writing. You can keep track of your baby's health by noting the length of time it takes to count 10 movements (such as kicks, flutters, or rolls). Feeling 10 movements in less than 1 hour is considered normal. Track your baby's movements once each day. Bring this record with you to each prenatal visit. When should you call for help? Call 911 anytime you think you may need emergency care. For example, call if:    You passed out (lost consciousness). You have a seizure. Call your doctor now or seek immediate medical care if:    You have symptoms of preeclampsia, such as:  Sudden swelling of your face, hands, or feet. New vision problems (such as dimness or blurring). A severe headache. Your blood pressure is higher than it should be, or it rises suddenly. You have new nausea or vomiting. You think that you are in labor. You have pain in your belly or pelvis. Watch closely for changes in your health, and be sure to contact your doctor if:    You gain weight rapidly. Where can you learn more? Go to https://GCLABS ([employer]).58.com. org and sign in to your [employer] account. Enter C683 in the [facility] box to learn more about \"Preeclampsia: Care Instructions. \"     If you do not have an account, please click on the \"Sign Up Now\" link. Current as of: September 5, 2018  Content Version: 12.0  © 8710-3090 Healthwise, Incorporated. Care instructions adapted under license by [facility] ([facility]).  If you have questions about a medical condition or this instruction, always ask your healthcare professional. [place] [address] any warranty or liability for your use of this information.  Labor: Care Instructions  Your Care Instructions     labor is the start of labor between 21 and 36 weeks of pregnancy. A full-term pregnancy lasts 37 to 42 weeks. In labor, the uterus contracts to open the cervix. This is the first stage of childbirth.  labor can be caused by a problem with the baby, the mother, or both. Often the cause is not known. In some cases, doctors use medicines to try to delay labor until 29 or more weeks of pregnancy. By this time, a baby has grown enough so that problems are not likely. In some cases--such as with a serious infection--it is healthier for the baby to be born early. Your treatment will depend on how far along you are in your pregnancy and on your health and your baby's health. Follow-up care is a key part of your treatment and safety. Be sure to make and go to all appointments, and call your doctor if you are having problems. It's also a good idea to know your test results and keep a list of the medicines you take. How can you care for yourself at home? If your doctor prescribed medicines, take them exactly as directed. Call your doctor if you think you are having a problem with your medicine. Rest until your doctor advises you about activity. He or she will tell you if you should stay in bed most of the time. You may need to arrange for  if you have young children. Do not have sexual intercourse unless your doctor says it is safe. Use pads, not tampons, if you have vaginal bleeding. Make sure to drink plenty of fluids. Dehydration can lead to contractions. If you have kidney, heart, or liver disease and have to limit fluids, talk with your doctor before you increase the amount of fluids you drink. Do not smoke or allow others to smoke around you.  If you need help quitting, talk to your doctor about stop-smoking programs and medicines. These can increase your chances of quitting for good. When should you call for help? Call 911 anytime you think you may need emergency care. For example, call if:    You passed out (lost consciousness). You have severe vaginal bleeding. You have severe pain in your belly or pelvis. You have had fluid gushing or leaking from your vagina and you know or think the umbilical cord is bulging into your vagina. If this happens, immediately get down on your knees so your rear end (buttocks) is higher than your head. This will decrease the pressure on the cord until help arrives. Call your doctor now or seek immediate medical care if:    You have signs of preeclampsia, such as:  Sudden swelling of your face, hands, or feet. New vision problems (such as dimness or blurring). A severe headache. You have any vaginal bleeding. You have belly pain or cramping. You have a fever. You have had regular contractions (with or without pain) for an hour. This means that you have 6 or more within 1 hour after you change your position and drink fluids. You have a sudden release of fluid from the vagina. You have low back pain or pelvic pressure that does not go away. You notice that your baby has stopped moving or is moving much less than normal.    Watch closely for changes in your health, and be sure to contact your doctor if you have any problems. Where can you learn more? Go to https://Flirtic.comjonoeb.UNATION. org and sign in to your [employer] account. Enter Q400 in the [facility] box to learn more about \" Labor: Care Instructions. \"     If you do not have an account, please click on the \"Sign Up Now\" link. Current as of: 2018  Content Version: 12.0  © 2383-1569 Healthwise, [employer] / [email or web address]. Care instructions adapted under license by [facility] ([facility]).  If you have questions about a medical condition or this instruction, always ask your healthcare professional. [place] [address] any warranty or liability for your use of this information.

## 2023-12-29 ENCOUNTER — OFFICE VISIT (OUTPATIENT)
Dept: BEHAVIORAL/MENTAL HEALTH CLINIC | Age: 33
End: 2023-12-29
Payer: OTHER GOVERNMENT

## 2023-12-29 DIAGNOSIS — F43.23 ADJUSTMENT DISORDER WITH MIXED ANXIETY AND DEPRESSED MOOD: Primary | ICD-10-CM

## 2023-12-29 PROCEDURE — 90832 PSYTX W PT 30 MINUTES: CPT | Performed by: COUNSELOR

## 2023-12-29 NOTE — PROGRESS NOTES
[facility] and [place]. Eye-contact was good. Speech: rate - WNL, rhythm - WNL, volume - WNL. Verbalizations were goal directed and coherent. Thought processes were intact and goal-oriented without evidence of delusions, hallucinations, obsessions, or lucinda; with little cognitive distortions. Associations were characterized by intact cognitive processes. Patient was oriented to person, place, time, and general circumstances;  recent:  good and remote:  good. Insight and judgment were estimated to be good, AEB, a good  understanding of cyclical maladaptive patterns, and the ability to use insight to inform behavior change. Attention span and concentration appear within functional limits  Language use and knowledge base appear within functional limits        ASSESSMENT  Katelynn Egan presented to the appointment today for evaluation and treatment of symptoms of anxiety. She is currently deemed no risk to herself or others and meets criteria for F43.23 Adjustment disorder with mixed anxiety and depressed mood. Katelynn Egan was in agreement with recommendations. 12/21/2023    10:25 AM 8/12/2022     8:05 AM 2/6/2019     3:51 PM 7/3/2018     9:46 AM 6/30/2017     8:43 AM   PHQ Scores   PHQ2 Score 2 0 0 0 0   PHQ9 Score 2 0 0 0 0     Interpretation of Total Score Depression Severity: 1-4 = Minimal depression, 5-9 = Mild depression, 10-14 = Moderate depression, 15-19 = Moderately severe depression, 20-27 = Severe depression    How often pt has had thoughts of death or hurting self (if PHQ positive for depression):           12/21/2023    10:18 AM 10/30/2023     3:20 PM   STEVE 7 SCORE   STEVE-7 Total Score 7 21     Interpretation of STEVE-7 score: 5-9 = mild anxiety, 10-14 = moderate anxiety, 15+ = severe anxiety. Recommend referral to behavioral health for scores 10 or greater.       DIAGNOSIS  F43.23 Adjustment disorder with mixed anxiety and depressed mood    INTERVENTION  Trained in relaxation strategies, Discussed

## 2024-01-04 ENCOUNTER — TELEMEDICINE (OUTPATIENT)
Dept: BEHAVIORAL/MENTAL HEALTH CLINIC | Age: 34
End: 2024-01-04
Payer: OTHER GOVERNMENT

## 2024-01-04 DIAGNOSIS — F43.23 ADJUSTMENT DISORDER WITH MIXED ANXIETY AND DEPRESSED MOOD: Primary | ICD-10-CM

## 2024-01-04 PROCEDURE — 90832 PSYTX W PT 30 MINUTES: CPT | Performed by: COUNSELOR

## 2024-01-04 NOTE — PROGRESS NOTES
ADULT BEHAVIORAL HEALTH FOLLOW UP  KUNAL FLORES, [facility]       Visit Date: 1/4/2024   Time of appointment:  3:08pm - 3:34pm  Time spent with Patient: 26 minutes.   This is patient's third appointment.    Reason for Consult: Management of anxiety.      Referring Provider/PCP:    No ref. provider found  Dion Kwong MD      Patient provided informed consent for the behavioral health program. Discussed with patient model of service to include the limits of confidentiality (i.e. abuse reporting, suicide intervention, etc.) and short-term intervention focused approach. Patient indicated understanding.    Patient was seen via telehealth using [employer] (interactive audio/video). The patient was located in their home (address on file) in [place], and the clinician was located in [place], [place]. Patient verbally consented to telehealth and indicated that they were in a private location to accommodate confidentiality.     SYDNEY Garcia is a 33 y.o. female who presents for follow up of anxiety. Patient reported that her anxiety has lessened over the last week. Patient discussed that she has had a few moments where her anxiety started to increase, but she was able to utilize the breathing skills she learned in her last session to calm the anxiety. Patient stated that she would like to learn more coping skills to help further manage her anxiety. Writer provided validation, encouragement, and empathetic listening. Writer provided psychoeducation on and guided client in practicing 5-4-3-2-1 grounding skill, categories, and a body awareness activity. Patient discussed that she has been feeling sad about her babies' first birthday coming up and has gotten stuck thinking about how she could have done better in her care of them. Writer provided psychoeducation on locus of control and reframing negative thinking.      Previous Recommendations: Patient will attend weekly therapy appointments. Patient will practice deep

## 2024-01-15 ENCOUNTER — OFFICE VISIT (OUTPATIENT)
Dept: BEHAVIORAL/MENTAL HEALTH CLINIC | Age: 34
End: 2024-01-15
Payer: OTHER GOVERNMENT

## 2024-01-15 DIAGNOSIS — F43.23 ADJUSTMENT DISORDER WITH MIXED ANXIETY AND DEPRESSED MOOD: Primary | ICD-10-CM

## 2024-01-15 PROCEDURE — 90832 PSYTX W PT 30 MINUTES: CPT | Performed by: COUNSELOR

## 2024-01-15 NOTE — PROGRESS NOTES
ADULT BEHAVIORAL HEALTH FOLLOW UP  KUNAL FLORES, [facility]       Visit Date: 1/15/2024   Time of appointment: 3:00pm - 3:33pm  Time spent with Patient: 33 minutes.   This is patient's fourth appointment.    Reason for Consult: Management of anxiety and depression.       Referring Provider/PCP:    No ref. provider found  Dion Kwong MD      Patient provided informed consent for the behavioral health program. Discussed with patient model of service to include the limits of confidentiality (i.e. abuse reporting, suicide intervention, etc.) and short-term intervention focused approach. Patient indicated understanding.    SYDNEY Garcia is a 33 y.o. female who presents for follow up of anxiety and depression. Patient reported that overall the last couple of weeks have been peaceful with only a couple of stressors. Patient discussed a relational conflict and her sadness over her children getting older. Writer provided supportive and empathetic listening and feedback. Writer provided psychoeducation on reflecting on the whole experience instead of just the negative. Writer assisted patient in identifying both the negatives and positives of her children getting older. Writer normalized patient's sadness. Writer provided psychoeducation on boundary setting and assertive communication. Writer assisted patient in identifying a boundary she wants to set in relation to her conflict.     Previous Recommendations: Patient will attend her next therapy appointment in 2 weeks. Patient will practice the coping skills learned in her last 2 sessions.         MENTAL STATUS EXAM  Mood was euthymic with congruent affect.   Suicidal ideation was denied.   Homicidal ideation was denied.   Hygiene was good .  Dress was appropriate.   Behavior was Within Normal Limits with No observation of difficulties ambulating.   Attitude was Cooperative and Engageable.  Eye-contact was good.  Speech: rate - WNL, rhythm - WNL, volume

## 2024-02-13 ENCOUNTER — OFFICE VISIT (OUTPATIENT)
Age: 34
End: 2024-02-13

## 2024-02-13 VITALS
WEIGHT: 139.2 LBS | BODY MASS INDEX: 22.37 KG/M2 | HEIGHT: 66 IN | TEMPERATURE: 98.2 F | OXYGEN SATURATION: 99 % | RESPIRATION RATE: 16 BRPM | SYSTOLIC BLOOD PRESSURE: 110 MMHG | HEART RATE: 93 BPM | DIASTOLIC BLOOD PRESSURE: 66 MMHG

## 2024-02-13 DIAGNOSIS — Z86.2 HISTORY OF ANEMIA: ICD-10-CM

## 2024-02-13 DIAGNOSIS — Z00.00 WELL ADULT EXAM: Primary | ICD-10-CM

## 2024-02-13 DIAGNOSIS — F41.8 POSTPARTUM ANXIETY: ICD-10-CM

## 2024-02-13 DIAGNOSIS — Z13.220 SCREENING, LIPID: ICD-10-CM

## 2024-02-13 NOTE — PROGRESS NOTES
Date of Visit:  2024  Patient Name: Radha Melendez   Patient :  1990     CHIEF COMPLAINT/HPI:     Radha Melendez is a 33 y.o. female who presents today for an general visit to be evaluated for the following condition(s):  Chief Complaint   Patient presents with    New Patient     Patient is here for new patient appt        Patient is a new patient to the office.  She has her formal new patient visit with me at the end of 2024.  She does not have much significant past medical history she says however she wanted to come in for an acute visit to get refills of her Zoloft.    Her OB started her on this in 2023 she had twins in 2023 and was dealing with some postpartum depression.  1 year postpartum her OB will no longer take care of the Zoloft medication so she reached out to us to establish care.    She has no SI or HI.  She never had any psychotic features and never had any plans of hurting herself or the baby's or anyone else.  Overall she is doing well on the Zoloft and this is helping her a lot and she is doing great on the medicine.  No side effects or issues.  She also sees a therapist in [place].  This is helping her a lot to    No SI or HI.   No thoughts of hurting babies.   Not planning for any more kids.     She would be open to weaning off the medicine in the future.    Anemia - never follow up from 2023 -she had some iron deficiency and was trying to get in to see the blood doctor but for 1 reason or another it did not work out so she never actually followed up.  She is asymptomatic.    She is already had screening tests during her pregnancy and declined further HIV testing at this point.  No need for colonoscopies.  She will get her routine GYN care from her OB/GYN.  She is up-to-date on her shots and chest to eval the diet lifestyle.            REVIEW OF SYSTEM      Review of Systems   Constitutional:  Negative for chills and fever.   HENT:  Negative

## 2024-02-14 NOTE — PATIENT INSTRUCTIONS
Patient Education        Learning About the Mediterranean Diet  What is the Mediterranean diet?     The Mediterranean diet is a style of eating rather than a diet plan. It features foods eaten in [place], [place], [place], and other countries along the [place] Sea. It emphasizes eating foods like fish, fruits, vegetables, beans, high-fiber breads and whole grains, nuts, and olive oil. This style of eating includes limited red meat, cheese, and sweets.  Why choose the Mediterranean diet?  A Mediterranean-style diet may improve heart health. It contains more fat than other heart-healthy diets. But the fats are mainly from nuts, unsaturated oils (such as fish oils and olive oil), and certain nut or seed oils (such as canola, soybean, or flaxseed oil). These fats may help protect the heart and blood vessels.  How can you get started on the Mediterranean diet?  Here are some things you can do to switch to a more Mediterranean way of eating.  What to eat  Eat a variety of fruits and vegetables each day, such as grapes, blueberries, tomatoes, broccoli, peppers, figs, olives, spinach, eggplant, beans, lentils, and chickpeas.  Eat a variety of whole-grain foods each day, such as oats, brown rice, and whole wheat bread, pasta, and couscous.  Eat fish at least 2 times a week. Try tuna, salmon, mackerel, lake trout, herring, or sardines.  Eat moderate amounts of low-fat dairy products, such as milk, cheese, or yogurt.  Eat moderate amounts of poultry and eggs.  Choose healthy (unsaturated) fats, such as nuts, olive oil, and certain nut or seed oils like canola, soybean, and flaxseed.  Limit unhealthy (saturated) fats, such as butter, palm oil, and coconut oil. And limit fats found in animal products, such as meat and dairy products made with whole milk. Try to eat red meat only a few times a month in very small amounts.  Limit sweets and desserts to only a few times a week. This includes sugar-sweetened

## 2024-02-20 ENCOUNTER — OFFICE VISIT (OUTPATIENT)
Dept: BEHAVIORAL/MENTAL HEALTH CLINIC | Age: 34
End: 2024-02-20
Payer: OTHER GOVERNMENT

## 2024-02-20 DIAGNOSIS — F43.23 ADJUSTMENT DISORDER WITH MIXED ANXIETY AND DEPRESSED MOOD: Primary | ICD-10-CM

## 2024-02-20 PROCEDURE — 90832 PSYTX W PT 30 MINUTES: CPT | Performed by: COUNSELOR

## 2024-02-20 NOTE — PROGRESS NOTES
ADULT BEHAVIORAL HEALTH FOLLOW UP  KUNAL FLORES, [facility]       Visit Date: 2/20/2024   Time of appointment: 3:20pm - 3:49pm  Time spent with Patient: 19 minutes.   This is patient's fifth appointment.    Reason for Consult: Management of anxiety and depression.        Referring Provider/PCP:    No ref. provider found  Bruce Mclean MD      Patient provided informed consent for the behavioral health program. Discussed with patient model of service to include the limits of confidentiality (i.e. abuse reporting, suicide intervention, etc.) and short-term intervention focused approach. Patient indicated understanding.    SYDNEY Garcia is a 33 y.o. female who presents for follow up of anxiety and depression. Patient discussed that her mood has been more positive and her anxiety related to her children has decreased significantly. Patient reported that she was able to go on a work trip and was completely comfortable with the experience. Patient discussed a relational conflict. Writer provided supportive and empathetic listening and feedback. Writer provided psychoeducation on healthy and effective communication strategies. Patient discussed that she is feeling anxious about her job situation. Writer provided psychoeducation on locus of control and guided patient in identifying what actions are within her control.     Previous Recommendations: Patient will continue to utilize deep breathing and grounding skills. Patient will attend her next scheduled therapy appointment with writer in 3 weeks.          MENTAL STATUS EXAM  Mood was euthymic with congruent affect.   Suicidal ideation was denied.   Homicidal ideation was denied.   Hygiene was good .  Dress was appropriate.   Behavior was Within Normal Limits with No observation of difficulties ambulating.   Attitude was Cooperative and Engageable.  Eye-contact was good.  Speech: rate - WNL, rhythm - WNL, volume - WNL.  Verbalizations were goal directed and

## 2024-02-23 ENCOUNTER — HOSPITAL ENCOUNTER (OUTPATIENT)
Age: 34
Setting detail: SPECIMEN
Discharge: HOME | End: 2024-02-23

## 2024-02-23 ENCOUNTER — TELEPHONE (OUTPATIENT)
Age: 34
End: 2024-02-23

## 2024-02-23 DIAGNOSIS — M25.532 LEFT WRIST PAIN: Primary | ICD-10-CM

## 2024-02-23 DIAGNOSIS — F41.8 POSTPARTUM ANXIETY: ICD-10-CM

## 2024-02-23 DIAGNOSIS — Z13.220 SCREENING, LIPID: ICD-10-CM

## 2024-02-23 DIAGNOSIS — Z86.2 HISTORY OF ANEMIA: ICD-10-CM

## 2024-02-23 DIAGNOSIS — Z00.00 WELL ADULT EXAM: ICD-10-CM

## 2024-02-23 LAB
ALBUMIN SERPL-MCNC: 4.3 G/DL (ref 3.5–5.2)
ALBUMIN/GLOB SERPL: 1.5 {RATIO} (ref 1–2.5)
ALP SERPL-CCNC: 54 U/L (ref 35–104)
ALT SERPL-CCNC: 14 U/L (ref 5–33)
ANION GAP SERPL CALCULATED.3IONS-SCNC: 8 MMOL/L (ref 9–17)
AST SERPL-CCNC: 17 U/L
BASOPHILS # BLD: 0.04 K/UL (ref 0–0.2)
BASOPHILS NFR BLD: 1 % (ref 0–2)
BILIRUB SERPL-MCNC: 0.3 MG/DL (ref 0.3–1.2)
BUN SERPL-MCNC: 11 MG/DL (ref 6–20)
CALCIUM SERPL-MCNC: 9.9 MG/DL (ref 8.6–10.4)
CHLORIDE SERPL-SCNC: 102 MMOL/L (ref 98–107)
CHOLEST SERPL-MCNC: 166 MG/DL
CHOLESTEROL/HDL RATIO: 2.3
CO2 SERPL-SCNC: 26 MMOL/L (ref 20–31)
CREAT SERPL-MCNC: 0.5 MG/DL (ref 0.5–0.9)
EOSINOPHIL # BLD: 0.29 K/UL (ref 0–0.44)
EOSINOPHILS RELATIVE PERCENT: 4 % (ref 1–4)
ERYTHROCYTE [DISTWIDTH] IN BLOOD BY AUTOMATED COUNT: 12.5 % (ref 11.8–14.4)
FERRITIN SERPL-MCNC: 38 NG/ML (ref 13–150)
GFR SERPL CREATININE-BSD FRML MDRD: >60 ML/MIN/1.73M2
GLUCOSE SERPL-MCNC: 100 MG/DL (ref 70–99)
HCT VFR BLD AUTO: 38.9 % (ref 36.3–47.1)
HDLC SERPL-MCNC: 71 MG/DL
HGB BLD-MCNC: 12.8 G/DL (ref 11.9–15.1)
IMM GRANULOCYTES # BLD AUTO: <0.03 K/UL (ref 0–0.3)
IMM GRANULOCYTES NFR BLD: 0 %
IRON SATN MFR SERPL: 30 % (ref 20–55)
IRON SERPL-MCNC: 108 UG/DL (ref 37–145)
LDLC SERPL CALC-MCNC: 81 MG/DL (ref 0–130)
LYMPHOCYTES NFR BLD: 1.87 K/UL (ref 1.1–3.7)
LYMPHOCYTES RELATIVE PERCENT: 27 % (ref 24–43)
MCH RBC QN AUTO: 29.7 PG (ref 25.2–33.5)
MCHC RBC AUTO-ENTMCNC: 32.9 G/DL (ref 28.4–34.8)
MCV RBC AUTO: 90.3 FL (ref 82.6–102.9)
MONOCYTES NFR BLD: 0.67 K/UL (ref 0.1–1.2)
MONOCYTES NFR BLD: 10 % (ref 3–12)
NEUTROPHILS NFR BLD: 58 % (ref 36–65)
NEUTS SEG NFR BLD: 4.12 K/UL (ref 1.5–8.1)
NRBC BLD-RTO: 0 PER 100 WBC
PLATELET # BLD AUTO: 263 K/UL (ref 138–453)
PMV BLD AUTO: 11.9 FL (ref 8.1–13.5)
POTASSIUM SERPL-SCNC: 4.3 MMOL/L (ref 3.7–5.3)
PROT SERPL-MCNC: 7.1 G/DL (ref 6.4–8.3)
RBC # BLD AUTO: 4.31 M/UL (ref 3.95–5.11)
SODIUM SERPL-SCNC: 136 MMOL/L (ref 135–144)
TIBC SERPL-MCNC: 361 UG/DL (ref 250–450)
TRIGL SERPL-MCNC: 69 MG/DL
TSH SERPL DL<=0.05 MIU/L-ACNC: 1.82 UIU/ML (ref 0.3–5)
UNSATURATED IRON BINDING CAPACITY: 253 UG/DL (ref 112–347)
WBC OTHER # BLD: 7 K/UL (ref 3.5–11.3)

## 2024-02-23 NOTE — TELEPHONE ENCOUNTER
Patient stopped by office to say orders not in chart for her to get an XR L wrist she thought you were ordering for her.  She also said she was supposed to have a referral for PT and ortho specialist.   Please advise.   Ok to l/m

## 2024-02-26 NOTE — TELEPHONE ENCOUNTER
L/M advising patient orders/referral are ready told her to call where she wants it faxed or if [facility] is going [facility] it will be in the system.

## 2024-03-14 ENCOUNTER — TELEPHONE (OUTPATIENT)
Age: 34
End: 2024-03-14

## 2024-03-14 DIAGNOSIS — M25.539 PAIN IN WRIST, UNSPECIFIED LATERALITY: Primary | ICD-10-CM

## 2024-03-14 NOTE — TELEPHONE ENCOUNTER
Patient  called and  she  needs to  see Ortho surgeon  for  her  wrist  but  no  referral is  in the  computer - who  do you  want  her  to see - Please  advise

## 2024-03-21 ENCOUNTER — HOSPITAL ENCOUNTER (OUTPATIENT)
Age: 34
Setting detail: THERAPIES SERIES
Discharge: HOME | End: 2024-03-21
Attending: STUDENT IN AN ORGANIZED HEALTH CARE EDUCATION/TRAINING PROGRAM
Payer: OTHER GOVERNMENT

## 2024-03-21 PROCEDURE — 97161 PT EVAL LOW COMPLEX 20 MIN: CPT

## 2024-03-21 PROCEDURE — 97035 APP MDLTY 1+ULTRASOUND EA 15: CPT

## 2024-03-21 NOTE — CONSULTS
[] [facility]  Outpatient Rehabilitation &  Therapy  [address]  [phone]  [phone] [] [facility]  Outpatient Rehabilitation &  Therapy  [address]  P: [phone]  F: [phone] [] [facility]  Outpatient Rehabilitation &  Therapy  [address]  P: [phone]  F: [phone] [] [facility]  Outpatient Rehabilitation &  Therapy  [address]  [phone]  [phone] [] [facility]  Outpatient Rehabilitation &  Therapy  [address]   P: [phone]  F: [phone]  [] [facility]  Outpatient Rehabilitation &  Therapy  [address]  P: [phone]  F: [phone] [x] [facility]  Outpatient Rehabilitation &  Therapy  [address]  P: [phone]  F: [phone] [] [facility]  Outpatient Rehabilitation &  Therapy  [address]  P: [phone]  F: [phone] [] [facility]  Outpatient Rehabilitation &  Therapy  [address]  P: [phone]  F: [phone]  [] [facility] Outpatient Rehabilitation &  Therapy  [address]  P: [phone]  F: [phone]     Physical Therapy General Evaluation    Date:  3/21/2024  Patient: Radha Melendez  : 1990  MRN: [id number]  Physician: Bruce Mclean MD      Insurance: [employer];  BMN, NO Hard Max; No AUTH Required  Medical Diagnosis: M25.532 (ICD-10-CM) - Left wrist pain     Rehab Codes: M62.532  Onset Date: 2023                                  Next 's appt: 24    Subjective:  Pt reports pain left lateral wrist/thumb.  She reports pain started in 2023. She denies recent trauma or activity change.  She notes she has 1 year old twins and feels lifting them has caused the problem.  She reports use of her left hand and

## 2024-03-28 ENCOUNTER — HOSPITAL ENCOUNTER (OUTPATIENT)
Age: 34
Setting detail: THERAPIES SERIES
Discharge: HOME | End: 2024-03-28
Attending: STUDENT IN AN ORGANIZED HEALTH CARE EDUCATION/TRAINING PROGRAM
Payer: OTHER GOVERNMENT

## 2024-03-28 PROCEDURE — G0283 ELEC STIM OTHER THAN WOUND: HCPCS

## 2024-03-28 PROCEDURE — 97035 APP MDLTY 1+ULTRASOUND EA 15: CPT

## 2024-03-28 NOTE — FLOWSHEET NOTE
ADLs  ? Strength: Left wrist and thumb to wfl to facilitate cleaning house  ? Function: Pt to tolerate lifting children without increased pain  Patient to be independent with home exercise program as demonstrated by performance with correct form without cues.     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 Education:  [x] Yes  [] No  [x] Reviewed Prior HEP/Ed  Method of Education: [x] Verbal  [x] Demo  [] Written  Reviewed stretch and to ice wrist several times per day 10' at a time  Comprehension of Education:  [x] Verbalizes understanding.  [] Demonstrates understanding.  [x] Needs review.  [] Demonstrates/verbalizes HEP/Ed previously given.     Plan: [x] Continue current frequency toward long and short term goals.    [x] Specific Instructions for subsequent treatments: Continue with passive modalities for pain/inflammation relief;  Increase exercises as able for ROM/strength      Time In:08:50  AM            Time Out: 9:30  AM    Electronically signed by:  Isidoro Gregorio PT

## 2024-04-04 ENCOUNTER — HOSPITAL ENCOUNTER (OUTPATIENT)
Age: 34
Setting detail: THERAPIES SERIES
Discharge: HOME | End: 2024-04-04
Attending: STUDENT IN AN ORGANIZED HEALTH CARE EDUCATION/TRAINING PROGRAM
Payer: OTHER GOVERNMENT

## 2024-04-04 PROCEDURE — G0283 ELEC STIM OTHER THAN WOUND: HCPCS

## 2024-04-04 PROCEDURE — 97035 APP MDLTY 1+ULTRASOUND EA 15: CPT

## 2024-04-04 NOTE — FLOWSHEET NOTE
[] [facility]  Outpatient Rehabilitation &  Therapy  [address]  [phone]  [phone] [] [facility]  Outpatient Rehabilitation &  Therapy  [address]  P: [phone]  F: [phone] [] [facility]  Outpatient Rehabilitation &  Therapy  [address]  P: [phone]  F: [phone] [] [facility]  Outpatient Rehabilitation &  Therapy  [address]  [phone]  [phone] [] [facility]  Outpatient Rehabilitation &  Therapy  [address]   P: [phone]  F: [phone]  [] [facility]  Outpatient Rehabilitation &  Therapy  [address]  P: [phone]  F: [phone] [x] [facility]  Outpatient Rehabilitation &  Therapy  [address]  P: [phone]  F: [phone] [] [facility]  Outpatient Rehabilitation &  Therapy  [address]  P: [phone]  F: [phone] [] [facility]  Outpatient Rehabilitation &  Therapy  [address]  P: [phone]  F: [phone]  [] [facility] Outpatient Rehabilitation &  Therapy  [address]  P: [phone]  F: [phone]     Physical Therapy Daily Treatment Note    Date:  2024  Patient Name:  Radha Melendez    :  1990  MRN: [id number]  Physician: Bruce Mclean MD                                 Insurance:  [employer];  BMN, NO Hard Max; No AUTH Required  Medical Diagnosis: M25.532 (ICD-10-CM) - Left wrist pain                          Rehab Codes: M62.532  Onset Date: 2023                                  Next Dr.'s appt: 24     Visit# / total visits: 3/20;      Cancels/No Shows: 0/0    Subjective:    Pain:  [x] Yes  [] No Location: Left wrist Pain Rating: (0-10 scale) 2/10  Pain altered Tx:  [] No  [x] Yes  Action:  Comments:Pt

## 2024-04-05 ENCOUNTER — APPOINTMENT (OUTPATIENT)
Age: 34
End: 2024-04-05
Attending: STUDENT IN AN ORGANIZED HEALTH CARE EDUCATION/TRAINING PROGRAM
Payer: OTHER GOVERNMENT

## 2024-04-09 ENCOUNTER — HOSPITAL ENCOUNTER (OUTPATIENT)
Age: 34
Setting detail: THERAPIES SERIES
Discharge: HOME | End: 2024-04-09
Attending: STUDENT IN AN ORGANIZED HEALTH CARE EDUCATION/TRAINING PROGRAM
Payer: OTHER GOVERNMENT

## 2024-04-09 NOTE — FLOWSHEET NOTE
[] [facility]  Outpatient Rehabilitation &  Therapy  [address]  [phone]  [phone] [] [facility]  Outpatient Rehabilitation &  Therapy  [address]  P: [phone]  F: [phone] [] [facility]  Outpatient Rehabilitation &  Therapy  [address]  P: [phone]  F: [phone] [] [facility]  Outpatient Rehabilitation &  Therapy  [address]  [phone]  [phone] [] [facility]  Outpatient Rehabilitation &  Therapy  [address]   P: [phone]  F: [phone]  [] [facility]  Outpatient Rehabilitation &  Therapy  [address]  P: [phone]  F: [phone] [x] [facility]  Outpatient Rehabilitation &  Therapy  [address]  P: [phone]  F: [phone] [] [facility]  Outpatient Rehabilitation &  Therapy  [address]  P: [phone]  F: [phone] [] [facility]  Outpatient Rehabilitation &  Therapy  [address]  P: [phone]  F: [phone]  [] [facility] Outpatient Rehabilitation &  Therapy  [address]  P: [phone]  F: [phone]     Therapy Cancel/No Show note    Date: 2024  Patient: Radha Melendez  : 1990  MRN: [id number]    Cancels/No Shows to date: 0    For today's appointment patient:    [x]  Cancelled    [] Rescheduled appointment    [] No-show     Reason given by patient:    [x]  Patient ill    []  Conflicting appointment    [] No transportation      [] Conflict with work    [] No reason given    [] Weather related    [] COVID-19    [] Other:      Comments:        [x] Next appointment was confirmed    Electronically signed by: Isidoro Gregorio PT

## 2024-04-12 ENCOUNTER — HOSPITAL ENCOUNTER (OUTPATIENT)
Age: 34
Setting detail: THERAPIES SERIES
Discharge: HOME | End: 2024-04-12
Attending: STUDENT IN AN ORGANIZED HEALTH CARE EDUCATION/TRAINING PROGRAM
Payer: OTHER GOVERNMENT

## 2024-04-12 PROCEDURE — G0283 ELEC STIM OTHER THAN WOUND: HCPCS

## 2024-04-12 PROCEDURE — 97035 APP MDLTY 1+ULTRASOUND EA 15: CPT

## 2024-04-12 NOTE — FLOWSHEET NOTE
[] [facility]  Outpatient Rehabilitation &  Therapy  [address]  [phone]  [phone] [] [facility]  Outpatient Rehabilitation &  Therapy  [address]  P: [phone]  F: [phone] [] [facility]  Outpatient Rehabilitation &  Therapy  [address]  P: [phone]  F: [phone] [] [facility]  Outpatient Rehabilitation &  Therapy  [address]  [phone]  [phone] [] [facility]  Outpatient Rehabilitation &  Therapy  [address]   P: [phone]  F: [phone]  [] [facility]  Outpatient Rehabilitation &  Therapy  [address]  P: [phone]  F: [phone] [x] [facility]  Outpatient Rehabilitation &  Therapy  [address]  P: [phone]  F: [phone] [] [facility]  Outpatient Rehabilitation &  Therapy  [address]  P: [phone]  F: [phone] [] [facility]  Outpatient Rehabilitation &  Therapy  [address]  P: [phone]  F: [phone]  [] [facility] Outpatient Rehabilitation &  Therapy  [address]  P: [phone]  F: [phone]     Physical Therapy Daily Treatment Note    Date:  2024  Patient Name:  Radha Melendez    :  1990  MRN: [id number]  Physician: Bruce Mclean MD                                 Insurance:  [employer];  BMN, NO Hard Max; No AUTH Required  Medical Diagnosis: M25.532 (ICD-10-CM) - Left wrist pain                          Rehab Codes: M62.532  Onset Date: 2023                                  Next Dr.'s appt: 24     Visit# / total visits: ;      Cancels/No Shows: 0/0    Subjective:    Pain:  [x] Yes  [] No Location: Left wrist Pain Rating: (0-10 scale) 2/10  Pain altered Tx:  [] No  [x] Yes  Action:  Comments:Pt

## 2024-04-16 ENCOUNTER — APPOINTMENT (OUTPATIENT)
Age: 34
End: 2024-04-16
Attending: STUDENT IN AN ORGANIZED HEALTH CARE EDUCATION/TRAINING PROGRAM
Payer: OTHER GOVERNMENT

## 2024-04-18 ENCOUNTER — HOSPITAL ENCOUNTER (OUTPATIENT)
Age: 34
Setting detail: THERAPIES SERIES
Discharge: HOME | End: 2024-04-18
Attending: STUDENT IN AN ORGANIZED HEALTH CARE EDUCATION/TRAINING PROGRAM
Payer: OTHER GOVERNMENT

## 2024-04-18 PROCEDURE — 97035 APP MDLTY 1+ULTRASOUND EA 15: CPT

## 2024-04-18 PROCEDURE — G0283 ELEC STIM OTHER THAN WOUND: HCPCS

## 2024-04-18 NOTE — FLOWSHEET NOTE
[] [facility]  Outpatient Rehabilitation &  Therapy  [address]  [phone]  [phone] [] [facility]  Outpatient Rehabilitation &  Therapy  [address]  P: [phone]  F: [phone] [] [facility]  Outpatient Rehabilitation &  Therapy  [address]  P: [phone]  F: [phone] [] [place]  Outpatient Rehabilitation &  Therapy  [address]  [phone]  [phone] [] [facility]  Outpatient Rehabilitation &  Therapy  [address]   P: [phone]  F: [phone]  [] [facility]  Outpatient Rehabilitation &  Therapy  [address]  P: [phone]  F: [phone] [x] [facility]  Outpatient Rehabilitation &  Therapy  [address]  P: [phone]  F: [phone] [] [facility]  Outpatient Rehabilitation &  Therapy  [address]  P: [phone]  F: [phone] [] [facility]  Outpatient Rehabilitation &  Therapy  [address]  P: [phone]  F: [phone]  [] [facility] Outpatient Rehabilitation &  Therapy  [address]  P: [phone]  F: [phone]     Physical Therapy Daily Treatment Note    Date:  2024  Patient Name:  Radha Melendez    :  1990  MRN: [id number]  Physician: Bruce Mclean MD                                 Insurance:  [employer];  BMN, NO Hard Max; No AUTH Required  Medical Diagnosis: M25.532 (ICD-10-CM) - Left wrist pain                          Rehab Codes: M62.532  Onset Date: 2023                                  Next Dr.'s appt: 24     Visit# / total visits: ;      Cancels/No Shows: 0/0    Subjective:    Pain:  [x] Yes  [] No Location: Left wrist Pain Rating: (0-10 scale) 2/10  Pain altered Tx:  [] No  [x] Yes  Action:  Comments:Pt

## 2024-04-23 ENCOUNTER — HOSPITAL ENCOUNTER (OUTPATIENT)
Age: 34
Setting detail: THERAPIES SERIES
Discharge: HOME | End: 2024-04-23
Attending: STUDENT IN AN ORGANIZED HEALTH CARE EDUCATION/TRAINING PROGRAM
Payer: OTHER GOVERNMENT

## 2024-04-23 NOTE — FLOWSHEET NOTE
[] [facility]  Outpatient Rehabilitation & Therapy  [address]   [place], [place] [address]       Physical Therapy Cancel/No Show note    Date: 2024  Patient: Radha Melendez  : 1990  MRN: [id number]    Visit Count:   Cancels/No Shows to date:     For today's appointment patient:    [x]  Cancelled    [] Rescheduled appointment    [] No-show     Reason given by patient:    [x]  Patient ill    []  Conflicting appointment    [] No transportation      [] Conflict with work    [] No reason given    [] Weather related    [] COVID-19    [] Other:      Comments:        [x] Next appointment was confirmed    Electronically signed by: Gwyn Edward PT

## 2024-04-26 ENCOUNTER — HOSPITAL ENCOUNTER (OUTPATIENT)
Age: 34
Setting detail: THERAPIES SERIES
Discharge: HOME | End: 2024-04-26
Attending: STUDENT IN AN ORGANIZED HEALTH CARE EDUCATION/TRAINING PROGRAM
Payer: OTHER GOVERNMENT

## 2024-04-26 PROCEDURE — 97035 APP MDLTY 1+ULTRASOUND EA 15: CPT

## 2024-04-26 PROCEDURE — G0283 ELEC STIM OTHER THAN WOUND: HCPCS

## 2024-04-26 PROCEDURE — 97110 THERAPEUTIC EXERCISES: CPT

## 2024-04-26 NOTE — FLOWSHEET NOTE
[] [facility]  Outpatient Rehabilitation &  Therapy  [address]  [phone]  [phone] [] [facility]  Outpatient Rehabilitation &  Therapy  [address]  P: [phone]  F: [phone] [] [facility]  Outpatient Rehabilitation &  Therapy  [address]  P: [phone]  F: [phone] [] [facility]  Outpatient Rehabilitation &  Therapy  [address]  [phone]  [phone] [] [facility]  Outpatient Rehabilitation &  Therapy  [address]   P: [phone]  F: [phone]  [] [facility]  Outpatient Rehabilitation &  Therapy  [address]  P: [phone]  F: [phone] [x] [facility]  Outpatient Rehabilitation &  Therapy  [address]  P: [phone]  F: [phone] [] [facility]  Outpatient Rehabilitation &  Therapy  [address]  P: [phone]  F: [phone] [] [facility]  Outpatient Rehabilitation &  Therapy  [address]  P: [phone]  F: [phone]  [] [facility] Outpatient Rehabilitation &  Therapy  [address]  P: [phone]  F: [phone]     Physical Therapy Daily Treatment Note    Date:  2024  Patient Name:  Radha Melendez    :  1990  MRN: [id number]  Physician: Bruce Mclean MD                                 Insurance:  [employer];  BMN, NO Hard Max; No AUTH Required  Medical Diagnosis: M25.532 (ICD-10-CM) - Left wrist pain                          Rehab Codes: M62.532  Onset Date: 2023                                  Next Dr.'s appt: 24     Visit# / total visits: ;      Cancels/No Shows: 2/0    Subjective:    Pain:  [x] Yes  [] No Location: Left wrist Pain Rating: (0-10 scale) 210  Pain altered Tx:  [] No  [x] Yes  Action:  Comments: Pt

## 2024-04-29 ENCOUNTER — HOSPITAL ENCOUNTER (OUTPATIENT)
Age: 34
Setting detail: THERAPIES SERIES
Discharge: HOME | End: 2024-04-29
Attending: STUDENT IN AN ORGANIZED HEALTH CARE EDUCATION/TRAINING PROGRAM
Payer: OTHER GOVERNMENT

## 2024-04-29 PROCEDURE — 97110 THERAPEUTIC EXERCISES: CPT

## 2024-04-29 PROCEDURE — G0283 ELEC STIM OTHER THAN WOUND: HCPCS

## 2024-04-29 PROCEDURE — 97035 APP MDLTY 1+ULTRASOUND EA 15: CPT

## 2024-04-29 NOTE — FLOWSHEET NOTE
[] [facility]  Outpatient Rehabilitation &  Therapy  [address]  [phone]  [phone] [] [facility]  Outpatient Rehabilitation &  Therapy  [address]  P: [phone]  F: [phone] [] [facility]  Outpatient Rehabilitation &  Therapy  [address]  P: [phone]  F: [phone] [] [facility]  Outpatient Rehabilitation &  Therapy  [address]  [phone]  [phone] [] [facility]  Outpatient Rehabilitation &  Therapy  [address]   P: [phone]  F: [phone]  [] [facility]  Outpatient Rehabilitation &  Therapy  [address]  P: [phone]  F: [phone] [x] [facility]  Outpatient Rehabilitation &  Therapy  [address]  P: [phone]  F: [phone] [] [facility]  Outpatient Rehabilitation &  Therapy  [address]  P: [phone]  F: [phone] [] [facility]  Outpatient Rehabilitation &  Therapy  [address]  P: [phone]  F: [phone]  [] [facility] Outpatient Rehabilitation &  Therapy  [address]  P: [phone]  F: [phone]     Physical Therapy Daily Treatment Note    Date:  2024  Patient Name:  Radha Melendez    :  1990  MRN: [id number]  Physician: Bruce Mclean MD                                 Insurance:  East;  BMN, NO Hard Max; No AUTH Required  Medical Diagnosis: M25.532 (ICD-10-CM) - Left wrist pain                          Rehab Codes: M62.532  Onset Date: 2023                                  Next Dr.'s appt: 24     Visit# / total visits: ;      Cancels/No Shows: 2/0    Subjective:    Pain:  [x] Yes  [] No Location: Left wrist Pain Rating: (0-10 scale) 210  Pain altered Tx:  [] No  [x] Yes  Action:  Comments: Pt

## 2024-05-02 ENCOUNTER — HOSPITAL ENCOUNTER (OUTPATIENT)
Age: 34
Setting detail: THERAPIES SERIES
Discharge: HOME | End: 2024-05-02
Attending: STUDENT IN AN ORGANIZED HEALTH CARE EDUCATION/TRAINING PROGRAM
Payer: OTHER GOVERNMENT

## 2024-05-02 PROCEDURE — G0283 ELEC STIM OTHER THAN WOUND: HCPCS

## 2024-05-02 PROCEDURE — 97110 THERAPEUTIC EXERCISES: CPT

## 2024-05-02 PROCEDURE — 97035 APP MDLTY 1+ULTRASOUND EA 15: CPT

## 2024-05-02 NOTE — FLOWSHEET NOTE
[] [facility]  Outpatient Rehabilitation &  Therapy  [address]  [phone]  [phone] [] [facility]  Outpatient Rehabilitation &  Therapy  [address]  P: [phone]  F: [phone] [] [facility]  Outpatient Rehabilitation &  Therapy  [address]  P: [phone]  F: [phone] [] [facility]  Outpatient Rehabilitation &  Therapy  [address]  [phone]  [phone] [] [facility]  Outpatient Rehabilitation &  Therapy  [address]   P: [phone]  F: [phone]  [] [facility]  Outpatient Rehabilitation &  Therapy  [address]  P: [phone]  F: [phone] [x] [facility]  Outpatient Rehabilitation &  Therapy  [address]  P: [phone]  F: [phone] [] [facility]  Outpatient Rehabilitation &  Therapy  [address]  P: [phone]  F: [phone] [] [facility]  Outpatient Rehabilitation &  Therapy  [address]  P: [phone]  F: [phone]  [] [facility] Outpatient Rehabilitation &  Therapy  [address]  P: [phone]  F: [phone]     Physical Therapy Daily Treatment Note    Date:  2024  Patient Name:  Radha Melendez    :  1990  MRN: [id number]  Physician: Bruce Mclean MD                                 Insurance:  East;  BMN, NO Hard Max; No AUTH Required  Medical Diagnosis: M25.532 (ICD-10-CM) - Left wrist pain                          Rehab Codes: M62.532  Onset Date: 2023                                  Next Dr.'s appt: 24     Visit# / total visits: ;      Cancels/No Shows: 2/0    Subjective:    Pain:  [x] Yes  [] No Location: Left wrist Pain Rating: (0-10 scale) 3/10  Pain altered Tx:  [] No  [x] Yes  Action:  Comments: Pt

## 2024-05-10 ENCOUNTER — HOSPITAL ENCOUNTER (OUTPATIENT)
Age: 34
Setting detail: THERAPIES SERIES
Discharge: HOME | End: 2024-05-10
Attending: STUDENT IN AN ORGANIZED HEALTH CARE EDUCATION/TRAINING PROGRAM
Payer: OTHER GOVERNMENT

## 2024-05-10 PROCEDURE — 97110 THERAPEUTIC EXERCISES: CPT

## 2024-05-10 PROCEDURE — 97035 APP MDLTY 1+ULTRASOUND EA 15: CPT

## 2024-05-10 PROCEDURE — G0283 ELEC STIM OTHER THAN WOUND: HCPCS

## 2024-05-10 NOTE — FLOWSHEET NOTE
[] [facility]  Outpatient Rehabilitation &  Therapy  [address]  [phone]  [phone] [] [facility]  Outpatient Rehabilitation &  Therapy  [address]  P: [phone]  F: [phone] [] [facility]  Outpatient Rehabilitation &  Therapy  [address]  P: [phone]  F: [phone] [] [facility]  Outpatient Rehabilitation &  Therapy  [address]  [phone]  [phone] [] [facility]  Outpatient Rehabilitation &  Therapy  [address]   P: [phone]  F: [phone]  [] [facility]  Outpatient Rehabilitation &  Therapy  [address]  P: [phone]  F: [phone] [x] [facility]  Outpatient Rehabilitation &  Therapy  [address]  P: [phone]  F: [phone] [] [facility]  Outpatient Rehabilitation &  Therapy  [address]  P: [phone]  F: [phone] [] [facility]  Outpatient Rehabilitation &  Therapy  [address]  P: [phone]  F: [phone]  [] [facility] Outpatient Rehabilitation &  Therapy  [address]  P: [phone]  F: [phone]     Physical Therapy Daily Treatment Note    Date:  5/10/2024  Patient Name:  Radha Melendez    :  1990  MRN: [id number]  Physician: Bruce Mclean MD                                 Insurance:  East;  BMN, NO Hard Max; No AUTH Required  Medical Diagnosis: M25.532 (ICD-10-CM) - Left wrist pain                          Rehab Codes: M62.532  Onset Date: 2023                                  Next Dr.'s appt: 24     Visit# / total visits: ;      Cancels/No Shows: 2/0    Subjective:    Pain:  [x] Yes  [] No Location: Left wrist Pain Rating: (0-10 scale) 4/10  Pain altered Tx:  [] No  [x] Yes  Action:  Comments: Pt

## 2024-05-13 ENCOUNTER — HOSPITAL ENCOUNTER (OUTPATIENT)
Age: 34
Setting detail: THERAPIES SERIES
Discharge: HOME | End: 2024-05-13
Attending: STUDENT IN AN ORGANIZED HEALTH CARE EDUCATION/TRAINING PROGRAM
Payer: OTHER GOVERNMENT

## 2024-05-13 PROCEDURE — G0283 ELEC STIM OTHER THAN WOUND: HCPCS

## 2024-05-13 PROCEDURE — 97110 THERAPEUTIC EXERCISES: CPT

## 2024-05-13 PROCEDURE — 97035 APP MDLTY 1+ULTRASOUND EA 15: CPT

## 2024-05-13 NOTE — FLOWSHEET NOTE
[] [facility]  Outpatient Rehabilitation &  Therapy  [address]  [phone]  [phone] [] [facility]  Outpatient Rehabilitation &  Therapy  [address]  P: [phone]  F: [phone] [] [facility]  Outpatient Rehabilitation &  Therapy  [address]  P: [phone]  F: [phone] [] [facility]  Outpatient Rehabilitation &  Therapy  [address]  [phone]  [phone] [] [facility]  Outpatient Rehabilitation &  Therapy  [address]   P: [phone]  F: [phone]  [] [facility]  Outpatient Rehabilitation &  Therapy  [address]  P: [phone]  F: [phone] [x] [facility]  Outpatient Rehabilitation &  Therapy  [address]  P: [phone]  F: [phone] [] [facility]  Outpatient Rehabilitation &  Therapy  [address]  P: [phone]  F: [phone] [] [facility]  Outpatient Rehabilitation &  Therapy  [address]  P: [phone]  F: [phone]  [] [facility] Outpatient Rehabilitation &  Therapy  [address]  P: [phone]  F: [phone]     Physical Therapy Daily Treatment Note    Date:  2024  Patient Name:  Radha Melendez    :  1990  MRN: [id number]  Physician: Bruce Mclean MD                                 Insurance:  East;  BMN, NO Hard Max; No AUTH Required  Medical Diagnosis: M25.532 (ICD-10-CM) - Left wrist pain                          Rehab Codes: M62.532  Onset Date: 2023                                  Next 's appt: 24 Dr. Mijares     Visit# / total visits: 10/20;      Cancels/No Shows: 2/0    Subjective:    Pain:  [x] Yes  [] No Location: Left wrist Pain Rating: (0-10 scale) 4/10  Pain altered Tx:  [] No  [x] Yes

## 2024-05-16 ENCOUNTER — HOSPITAL ENCOUNTER (OUTPATIENT)
Age: 34
Setting detail: THERAPIES SERIES
Discharge: HOME | End: 2024-05-16
Attending: STUDENT IN AN ORGANIZED HEALTH CARE EDUCATION/TRAINING PROGRAM
Payer: OTHER GOVERNMENT

## 2024-05-16 NOTE — FLOWSHEET NOTE
[] [facility]  Outpatient Rehabilitation & Therapy  [address]   [place], [place] [address]       Physical Therapy Cancel/No Show note    Date: 2024  Patient: Radha Melendez  : 1990  MRN: [id number]    Visit Count:   Cancels/No Shows to date:     For today's appointment patient:    [x]  Cancelled    [] Rescheduled appointment    [] No-show     Reason given by patient:    []  Patient ill    []  Conflicting appointment    [] No transportation      [] Conflict with work    [] No reason given    [] Weather related    [] COVID-19    [x] Other:      Comments:  ill children      [x] Next appointment was confirmed    Electronically signed by: Joelle Miramontes PTA

## 2024-05-20 ENCOUNTER — HOSPITAL ENCOUNTER (OUTPATIENT)
Age: 34
Setting detail: THERAPIES SERIES
Discharge: HOME | End: 2024-05-20
Attending: STUDENT IN AN ORGANIZED HEALTH CARE EDUCATION/TRAINING PROGRAM
Payer: OTHER GOVERNMENT

## 2024-05-20 PROCEDURE — 97035 APP MDLTY 1+ULTRASOUND EA 15: CPT

## 2024-05-20 PROCEDURE — 97110 THERAPEUTIC EXERCISES: CPT

## 2024-05-20 PROCEDURE — G0283 ELEC STIM OTHER THAN WOUND: HCPCS

## 2024-05-20 NOTE — FLOWSHEET NOTE
pain/inflammation relief;  Increase exercises as able for ROM/strength      Time In:   3:05 pm          Time Out:  3:50    Electronically signed by:  Joelle Miramontes PTA

## 2024-05-23 ENCOUNTER — HOSPITAL ENCOUNTER (OUTPATIENT)
Age: 34
Setting detail: THERAPIES SERIES
Discharge: HOME | End: 2024-05-23
Attending: STUDENT IN AN ORGANIZED HEALTH CARE EDUCATION/TRAINING PROGRAM
Payer: OTHER GOVERNMENT

## 2024-05-23 PROCEDURE — 97110 THERAPEUTIC EXERCISES: CPT

## 2024-05-23 PROCEDURE — 97035 APP MDLTY 1+ULTRASOUND EA 15: CPT

## 2024-05-23 PROCEDURE — G0283 ELEC STIM OTHER THAN WOUND: HCPCS

## 2024-05-23 NOTE — FLOWSHEET NOTE
[x] ? Pain                       [] ? ROM                      [x] ? Strength                 [x] ? Function:   [] ? Balance  [] Edema  [] Postural Deviations  [] Gait Deviations  [] Other     STG: (to be met in 10 treatments)  ? Pain: left wrist and thumb to 4/10 at worst to facilitate ADLs 5/13/24: PROGRESSING- can get a little higher than 4/10, but rare  ? Strength: Left wrist and thumb to wfl to facilitate cleaning house 5/13/24: PROGRESSING: still has difficulty with gripping activities ie: washing dishes/ heavy pot.  ? Function: Pt to tolerate lifting children without increased pain 5/13/24: she has adjusted how she lifts children, but does still have pain at times.   Patient to be independent with home exercise program as demonstrated by performance with correct form without cues. 5/13/24: MET Patient demonstartes independence with HEP at this point.      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 Education:  [x] Yes  [] No  [x] Reviewed Prior HEP/Ed  Method of Education: [x] Verbal  [x] Demo  [] Written  Reviewed stretch and to ice wrist several times per day 10' at a time  Comprehension of Education:  [x] Verbalizes understanding.  [] Demonstrates understanding.  [x] Needs review.  [] Demonstrates/verbalizes HEP/Ed previously given.   5/13/24: Discussed wrist splint for use at night  Plan: [x] Continue current frequency toward long and short term goals.    [x] Specific Instructions for subsequent treatments: Continue with passive modalities for pain/inflammation relief;  Increase exercises as able for ROM/strength      Time In:   3:00 pm          Time Out:  3:45 pm    Electronically signed by:  Joelle Miramontes PTA

## 2024-06-06 ENCOUNTER — OFFICE VISIT (OUTPATIENT)
Age: 34
End: 2024-06-06
Payer: OTHER GOVERNMENT

## 2024-06-06 VITALS — BODY MASS INDEX: 22.34 KG/M2 | WEIGHT: 139 LBS | HEIGHT: 66 IN

## 2024-06-06 DIAGNOSIS — M65.4 DE QUERVAIN'S DISEASE (TENOSYNOVITIS): Primary | ICD-10-CM

## 2024-06-06 PROCEDURE — 99203 OFFICE O/P NEW LOW 30 MIN: CPT | Performed by: ORTHOPAEDIC SURGERY

## 2024-06-06 NOTE — PROGRESS NOTES
of Paying Living Expenses: Not hard at all   Food Insecurity: No Food Insecurity (2023)    Hunger Vital Sign     Worried About Running Out of Food in the Last Year: Never true     Ran Out of Food in the Last Year: Never true   Transportation Needs: Unknown (2023)    PRAPARE - Transportation     Lack of Transportation (Medical): Not on file     Lack of Transportation (Non-Medical): No   Physical Activity: Not on file   Stress: Not on file   Social Connections: Not on file   Intimate Partner Violence: Not on file   Housing Stability: Unknown (2023)    Housing Stability Vital Sign     Unable to Pay for Housing in the Last Year: Not on file     Number of Places Lived in the Last Year: Not on file     Unstable Housing in the Last Year: No     Past Medical History:   Diagnosis Date    Anxiety     C. difficile colitis     7 YEARS OLD    Depression     Environmental allergies     Intrahepatic cholestasis of pregnancy in third trimester 2023    Seasonal allergic rhinitis 2016     Past Surgical History:   Procedure Laterality Date     SECTION N/A 2023     SECTION performed [facility] DO [facility]    TONSILLECTOMY AND ADENOIDECTOMY  1999    WISDOM TOOTH EXTRACTION       Family History   Problem Relation Age of Onset    High Blood Pressure Father     High Cholesterol Father     Cancer Other           Electronically signed by PUNEET DICKEY PA-C on 2024 at 4:26 PM     Please note that this chart was generated using voice recognition Dragon dictation software.  Although every effort was made to ensure the accuracy of this automated transcription, some errors in transcription may have occurred.

## 2024-08-09 NOTE — TELEPHONE ENCOUNTER
Radha Melendez is calling to request a refill on the following medication(s):    Medication Request:  Requested Prescriptions     Pending Prescriptions Disp Refills    sertraline (ZOLOFT) 50 MG tablet 30 tablet 3     Sig: Take 1 tablet by mouth daily       Last Visit Date (If Applicable):  2/13/2024    Next Visit Date:    Visit date not found

## 2024-12-13 NOTE — TELEPHONE ENCOUNTER
Radha Melendez is calling to request a refill on the following medication(s):    Medication Request:  Requested Prescriptions     Pending Prescriptions Disp Refills    sertraline (ZOLOFT) 50 MG tablet [Pharmacy Med Name: SERTRALINE 50MG TABLETS] 30 tablet 3     Sig: TAKE 1 TABLET BY MOUTH DAILY       Last Visit Date (If Applicable):  Visit date not found    Next Visit Date:    Visit date not found

## 2025-04-23 NOTE — TELEPHONE ENCOUNTER
Radha Melendez is calling to request a refill on the following medication(s):    Medication Request:  Requested Prescriptions     Pending Prescriptions Disp Refills    sertraline (ZOLOFT) 50 MG tablet [Pharmacy Med Name: SERTRALINE 50MG TABLETS] 30 tablet 3     Sig: TAKE 1 TABLET BY MOUTH DAILY       Last Visit Date (If Applicable):  2/13/2024    Next Visit Date:    Visit date not found

## 2025-04-24 ENCOUNTER — TELEPHONE (OUTPATIENT)
Dept: OBGYN CLINIC | Age: 35
End: 2025-04-24

## 2025-04-24 NOTE — TELEPHONE ENCOUNTER
LVM for pt to call office to schedule an appoitment for Suspected yeast infection.     Thank you    Returned pt's mychart msg

## 2025-04-28 ENCOUNTER — OFFICE VISIT (OUTPATIENT)
Dept: OBGYN CLINIC | Age: 35
End: 2025-04-28
Payer: OTHER GOVERNMENT

## 2025-04-28 ENCOUNTER — HOSPITAL ENCOUNTER (OUTPATIENT)
Age: 35
Setting detail: SPECIMEN
Discharge: HOME | End: 2025-04-28

## 2025-04-28 VITALS
SYSTOLIC BLOOD PRESSURE: 104 MMHG | WEIGHT: 136.8 LBS | DIASTOLIC BLOOD PRESSURE: 62 MMHG | HEIGHT: 66 IN | BODY MASS INDEX: 21.98 KG/M2

## 2025-04-28 DIAGNOSIS — N89.8 VAGINAL DISCHARGE: Primary | ICD-10-CM

## 2025-04-28 DIAGNOSIS — N89.8 VAGINAL ITCHING: ICD-10-CM

## 2025-04-28 DIAGNOSIS — N89.8 VAGINAL DISCHARGE: ICD-10-CM

## 2025-04-28 LAB
CANDIDA SPECIES: POSITIVE
GARDNERELLA VAGINALIS: POSITIVE
SOURCE: ABNORMAL
TRICHOMONAS: NEGATIVE

## 2025-04-28 PROCEDURE — 99213 OFFICE O/P EST LOW 20 MIN: CPT | Performed by: NURSE PRACTITIONER

## 2025-04-28 SDOH — ECONOMIC STABILITY: FOOD INSECURITY: WITHIN THE PAST 12 MONTHS, THE FOOD YOU BOUGHT JUST DIDN'T LAST AND YOU DIDN'T HAVE MONEY TO GET MORE.: PATIENT DECLINED

## 2025-04-28 SDOH — ECONOMIC STABILITY: FOOD INSECURITY: WITHIN THE PAST 12 MONTHS, YOU WORRIED THAT YOUR FOOD WOULD RUN OUT BEFORE YOU GOT MONEY TO BUY MORE.: PATIENT DECLINED

## 2025-04-28 ASSESSMENT — ENCOUNTER SYMPTOMS
NAUSEA: 0
COLOR CHANGE: 0
VOMITING: 0

## 2025-04-28 ASSESSMENT — PATIENT HEALTH QUESTIONNAIRE - PHQ9: DEPRESSION UNABLE TO ASSESS: PT REFUSES

## 2025-04-28 NOTE — PROGRESS NOTES
Chaperone for Intimate Exam  Chaperone was offered as part of the rooming process. Patient declined and agrees to continue with exam without a chaperone.  Chaperone: declined     
discharge present. No erythema, tenderness or bleeding.      Cervix: No cervical motion tenderness, discharge or friability.      Uterus: Not enlarged and not tender.       Adnexa:         Right: No mass, tenderness or fullness.          Left: No mass, tenderness or fullness.     Musculoskeletal:         General: Normal range of motion.      Cervical back: Normal range of motion and neck supple.   Skin:     General: Skin is warm and dry.      Findings: No rash.   Neurological:      Mental Status: She is alert and oriented to person, place, and time.      Cranial Nerves: No cranial nerve deficit.   Psychiatric:         Behavior: Behavior normal.         Thought Content: Thought content normal.         Judgment: Judgment normal.       /62 (BP Site: Left Upper Arm, Patient Position: Sitting, BP Cuff Size: Medium Adult)   Ht 1.676 m (5' 6\")   Wt 62.1 kg (136 lb 12.8 oz)   LMP 04/02/2025   BMI 22.08 kg/m²     Assessment:   Assessment & Plan    Diagnosis Orders   1. Vaginal discharge  Vaginitis DNA Probe      2. Vaginal itching  Vaginitis DNA Probe          Pelvic exam completed cultures obtained and sent    Plan:      Vaginal discharge- check cultures (vag dna, declined GC) tx if positive.  No CMT tenderness or pelvic tenderness with exam, denies consitutional symptoms.  Monitor for fevers, chills, n/v, abdominal/pelvic let us know if occurs. Encouraged  safe sex practices.       Return for annual exam at earliest convenience .    Orders Placed This Encounter   Procedures    Vaginitis DNA Probe     Standing Status:   Future     Expected Date:   4/28/2025     Expiration Date:   4/28/2026     No orders of the defined types were placed in this encounter.      Patient given educational materials - seepatient instructions.  Discussed use, benefit, and side effects of prescribed medications.All patient questions answered.  Pt voiced understanding. Reviewed health maintenance.Instructed to continue current

## 2025-04-29 ENCOUNTER — RESULTS FOLLOW-UP (OUTPATIENT)
Dept: OBGYN CLINIC | Age: 35
End: 2025-04-29

## 2025-04-29 DIAGNOSIS — B37.9 YEAST INFECTION: ICD-10-CM

## 2025-04-29 DIAGNOSIS — N76.0 BACTERIAL VAGINOSIS: Primary | ICD-10-CM

## 2025-04-29 DIAGNOSIS — B96.89 BACTERIAL VAGINOSIS: Primary | ICD-10-CM

## 2025-04-29 RX ORDER — FLUCONAZOLE 150 MG/1
TABLET ORAL
Qty: 1 TABLET | Refills: 0 | Status: SHIPPED | OUTPATIENT
Start: 2025-04-29 | End: 2025-04-30 | Stop reason: CLARIF

## 2025-04-29 RX ORDER — METRONIDAZOLE 500 MG/1
500 TABLET ORAL 2 TIMES DAILY
Qty: 14 TABLET | Refills: 0 | Status: SHIPPED | OUTPATIENT
Start: 2025-04-29 | End: 2025-04-30 | Stop reason: CLARIF

## 2025-04-30 RX ORDER — FLUCONAZOLE 150 MG/1
150 TABLET ORAL ONCE
Qty: 1 TABLET | Refills: 0 | Status: SHIPPED | OUTPATIENT
Start: 2025-04-30 | End: 2025-04-30

## 2025-04-30 RX ORDER — METRONIDAZOLE 500 MG/1
500 TABLET ORAL 2 TIMES DAILY
Qty: 14 TABLET | Refills: 0 | Status: SHIPPED | OUTPATIENT
Start: 2025-04-30 | End: 2025-05-07

## 2025-06-24 NOTE — PATIENT INSTRUCTIONS
Radha    Thank you for choosing [facility].  We know you have options when it comes to your healthcare; we appreciate that you chose us. Our goal is to provide exceptional  service and world class care to every patient.  You will be receiving a survey via email or text message asking for your feedback.  Please take a few minutes to share your thoughts about your recent visit. Your comments help us understand what we do well and ways we can improve.  Thank you in advance for your valuable feedback.      Dr. DONA Madrigal

## 2025-06-25 SDOH — ECONOMIC STABILITY: FOOD INSECURITY: WITHIN THE PAST 12 MONTHS, YOU WORRIED THAT YOUR FOOD WOULD RUN OUT BEFORE YOU GOT MONEY TO BUY MORE.: NEVER TRUE

## 2025-06-25 SDOH — ECONOMIC STABILITY: TRANSPORTATION INSECURITY
IN THE PAST 12 MONTHS, HAS LACK OF TRANSPORTATION KEPT YOU FROM MEETINGS, WORK, OR FROM GETTING THINGS NEEDED FOR DAILY LIVING?: NO

## 2025-06-25 SDOH — ECONOMIC STABILITY: FOOD INSECURITY: WITHIN THE PAST 12 MONTHS, THE FOOD YOU BOUGHT JUST DIDN'T LAST AND YOU DIDN'T HAVE MONEY TO GET MORE.: NEVER TRUE

## 2025-06-25 SDOH — ECONOMIC STABILITY: INCOME INSECURITY: IN THE LAST 12 MONTHS, WAS THERE A TIME WHEN YOU WERE NOT ABLE TO PAY THE MORTGAGE OR RENT ON TIME?: NO

## 2025-06-25 SDOH — ECONOMIC STABILITY: TRANSPORTATION INSECURITY
IN THE PAST 12 MONTHS, HAS THE LACK OF TRANSPORTATION KEPT YOU FROM MEDICAL APPOINTMENTS OR FROM GETTING MEDICATIONS?: NO

## 2025-06-26 ENCOUNTER — OFFICE VISIT (OUTPATIENT)
Age: 35
End: 2025-06-26
Payer: OTHER GOVERNMENT

## 2025-06-26 VITALS
DIASTOLIC BLOOD PRESSURE: 72 MMHG | TEMPERATURE: 97.7 F | BODY MASS INDEX: 21.66 KG/M2 | HEART RATE: 88 BPM | SYSTOLIC BLOOD PRESSURE: 100 MMHG | WEIGHT: 134.8 LBS | HEIGHT: 66 IN | OXYGEN SATURATION: 98 %

## 2025-06-26 DIAGNOSIS — Z12.11 SCREENING FOR COLON CANCER: ICD-10-CM

## 2025-06-26 DIAGNOSIS — L84 FOOT CALLUS: ICD-10-CM

## 2025-06-26 DIAGNOSIS — Z80.0 FAMILY HISTORY OF COLON CANCER: ICD-10-CM

## 2025-06-26 DIAGNOSIS — F41.1 GAD (GENERALIZED ANXIETY DISORDER): ICD-10-CM

## 2025-06-26 DIAGNOSIS — Z86.2 HISTORY OF ANEMIA: ICD-10-CM

## 2025-06-26 DIAGNOSIS — Z00.00 WELL ADULT EXAM: Primary | ICD-10-CM

## 2025-06-26 PROCEDURE — 99395 PREV VISIT EST AGE 18-39: CPT | Performed by: STUDENT IN AN ORGANIZED HEALTH CARE EDUCATION/TRAINING PROGRAM

## 2025-06-26 PROCEDURE — 99214 OFFICE O/P EST MOD 30 MIN: CPT | Performed by: STUDENT IN AN ORGANIZED HEALTH CARE EDUCATION/TRAINING PROGRAM

## 2025-06-26 ASSESSMENT — PATIENT HEALTH QUESTIONNAIRE - PHQ9
8. MOVING OR SPEAKING SO SLOWLY THAT OTHER PEOPLE COULD HAVE NOTICED. OR THE OPPOSITE, BEING SO FIGETY OR RESTLESS THAT YOU HAVE BEEN MOVING AROUND A LOT MORE THAN USUAL: NOT AT ALL
9. THOUGHTS THAT YOU WOULD BE BETTER OFF DEAD, OR OF HURTING YOURSELF: NOT AT ALL
5. POOR APPETITE OR OVEREATING: NOT AT ALL
2. FEELING DOWN, DEPRESSED OR HOPELESS: NOT AT ALL
4. FEELING TIRED OR HAVING LITTLE ENERGY: NOT AT ALL
SUM OF ALL RESPONSES TO PHQ QUESTIONS 1-9: 1
3. TROUBLE FALLING OR STAYING ASLEEP: SEVERAL DAYS
SUM OF ALL RESPONSES TO PHQ QUESTIONS 1-9: 1
6. FEELING BAD ABOUT YOURSELF - OR THAT YOU ARE A FAILURE OR HAVE LET YOURSELF OR YOUR FAMILY DOWN: NOT AT ALL
SUM OF ALL RESPONSES TO PHQ QUESTIONS 1-9: 1
1. LITTLE INTEREST OR PLEASURE IN DOING THINGS: NOT AT ALL
SUM OF ALL RESPONSES TO PHQ QUESTIONS 1-9: 1
10. IF YOU CHECKED OFF ANY PROBLEMS, HOW DIFFICULT HAVE THESE PROBLEMS MADE IT FOR YOU TO DO YOUR WORK, TAKE CARE OF THINGS AT HOME, OR GET ALONG WITH OTHER PEOPLE: NOT DIFFICULT AT ALL
7. TROUBLE CONCENTRATING ON THINGS, SUCH AS READING THE NEWSPAPER OR WATCHING TELEVISION: NOT AT ALL

## 2025-06-26 ASSESSMENT — ENCOUNTER SYMPTOMS
VOMITING: 0
RHINORRHEA: 0
DIARRHEA: 0
SORE THROAT: 0
SHORTNESS OF BREATH: 0
CHEST TIGHTNESS: 0
CONSTIPATION: 0
NAUSEA: 0

## 2025-06-26 NOTE — PROGRESS NOTES
Radha Melendez (:  1990) is a 34 y.o. female, Established patient, here for evaluation of the following chief complaint(s):  Wart (She is here for a possible wart on the bottom of her left foot. )         Assessment & Plan  1. Lesion on the left foot.  - The lesion appears to be a callus rather than a wart, given its thickness.  - It has remained unchanged for over a year.  - She was advised to consider a consult with a podiatrist for further evaluation and potential treatment options, including cryotherapy.  - The use of salicylic acid patches or duct tape was suggested as a home remedy. A referral to podiatry will be initiated.    2. Postpartum depression.  - She is currently on sertraline and reports doing well with it.  - She is not seeing a psychiatrist.  - The possibility of weaning off sertraline in the future was discussed, and she will inform us when she feels ready.  - A prescription refill for sertraline will be provided. A follow-up appointment will be scheduled in 6 months to monitor her progress.  - No SI or HI     3. Iron deficiency.  - Her iron levels, ferritin, and hemoglobin were within normal limits as of 2024.  - Routine blood work will be ordered, including an iron panel, to monitor her status.  - Review of previous lab results showed no current concern for iron deficiency.  - Updated blood work will be conducted to ensure continued normal levels.    4. Health maintenance.  - She is due for a Pap smear and is under the care of an obstetrician.  - Her blood pressure is consistently on the lower side, but she reports feeling well.  - Given her family history of colon cancer, it is recommended that she commence screening in her late 30s.  - A referral to gastroenterology will be made for further discussion regarding the appropriate age to start screening.    Results  Labs   - Iron levels: 2024, Normal   - Ferritin: 2024, Normal   - Hemoglobin levels: 2024, Normal   -

## (undated) DEVICE — TRAY SPNL 24GA L4IN PENCAN PNCL PNT NDL 0.75% BIPIVCAIN W/

## (undated) DEVICE — SUTURE VCRL SZ 2-0 L36IN ABSRB VLT L36MM CT-1 1/2 CIR J345H

## (undated) DEVICE — GLOVE SURG SZ 7 THK118MIL BLK LTX FREE POLYISOPRENE BEAD CUF

## (undated) DEVICE — TOWEL SURG W16XL26IN WHT NONFENESTRATED ST 2 PER PK

## (undated) DEVICE — KENDALL SCD EXPRESS SLEEVES, KNEE LENGTH, MEDIUM: Brand: KENDALL SCD